# Patient Record
Sex: FEMALE | Race: WHITE | Employment: OTHER
[De-identification: names, ages, dates, MRNs, and addresses within clinical notes are randomized per-mention and may not be internally consistent; named-entity substitution may affect disease eponyms.]

---

## 2019-06-10 ENCOUNTER — HOSPITAL ENCOUNTER (OUTPATIENT)
Dept: PHYSICAL THERAPY | Facility: CLINIC | Age: 74
Setting detail: THERAPIES SERIES
Discharge: HOME | End: 2019-06-10
Payer: MEDICARE

## 2019-06-10 ENCOUNTER — APPOINTMENT (OUTPATIENT)
Dept: PHYSICAL THERAPY | Facility: CLINIC | Age: 74
End: 2019-06-10
Payer: MEDICARE

## 2019-06-10 NOTE — FLOWSHEET NOTE
[] [address]  [address]    [phone]  F: [phone]   [] [address]  [facility]   [address]  P: [phone]  F: [phone]  [x] [place]  [address] [facility]  P: [phone]  F: [phone]   [] [address] [facility] [address]   [place]: [phone]  F: [phone]  [] [place] [address] [facility] [address]  [place]: [phone]   F: [phone]     Physical Therapy Cancel/No Show note    Date: 6/10/2019  Patient: Brody Brower  : 1945  MRN: [id number]    Cancels/No Shows to date: 1 evaluation     For today's appointment patient:    [x]  Cancelled    [] Rescheduled appointment    [] No-show     Reason given by patient:    []  Patient ill    []  Conflicting appointment    [] No transportation      [] Conflict with work    [] No reason given    [] Weather related    [] Other:      Comments:   In pain today       [x] Next appointment was confirmed    Electronically signed by: Servando Blake

## 2019-06-24 ENCOUNTER — HOSPITAL ENCOUNTER (OUTPATIENT)
Dept: PHYSICAL THERAPY | Facility: CLINIC | Age: 74
Setting detail: THERAPIES SERIES
Discharge: HOME | End: 2019-06-24
Payer: MEDICARE

## 2019-06-24 PROCEDURE — 97113 AQUATIC THERAPY/EXERCISES: CPT

## 2019-06-24 PROCEDURE — 97162 PT EVAL MOD COMPLEX 30 MIN: CPT

## 2019-06-24 NOTE — FLOWSHEET NOTE
Mikal. [address] [place] [address]  [address], [facility] 36.  Phone: [phone]  Fax:     [phone]    Physical Therapy Evaluation    Date:  2019  Patient: Martha Plummer  : 1945  MRN: [id number]  Physician: Misty Salazar: Medicare              Eligibility Status:  Eligible     Secondary Insurance (if applicable) [address]              Eligibility Status: Eligible     DOS: 6/10/19  # of visits allowed/remaining: follow medicare guidelines  Source: Website  Medical Diagnosis: Lumbar pain    Rehab Codes: M54.5  Onset Date:                                    Subjective:  Pt reports pain, stiffness of B lower lumbar, post/lat hip, knee regions. Pt reports long hx of pain which she attributes to arthritis/fibromyalgia. Previous Aquatic PT w/ good results. Pt states her activity level was dramatically reduced 3-4 yrs ago when her  became ill w/ Parkinsons Dementia which required to be at his bedside constantly. 2 yrs ago she a fall in her son's garage which caused multiple rib fx. Since that time she has noted a progressive decline in her mobility w/ a worsening of her back, hip, and knee symptoms.      PMHx: [] Unremarkable [] Diabetes [] HTN  [] Pacemaker   [] MI/Heart Problems [] Cancer [] Arthritis [] Asthma                         [x] refer to full medical chart  In [facility]  [] Other:        Tests: [] X-Ray: [] MRI:  [] Other:    Medications: [x] Refer to full medical record [] None [] Other:  Allergies:      [x] Refer to full medical record [] None [] Other:    Function:  Hand Dominance  [x] Right  [] Left  Working:  [x] Normal Duty  [] Light Duty  [] Off D/T Condition  [] Retired     [] Not Employed  []  Disability  [] Other:            Return to work:   Job/ADL Description: Pt retired, amb w/ str cane    Pain:  [x] Yes  [] No Pain Rating: (0-10 scale) 4/10  Pain altered Tx:  [] Yes  [x] No  Action:    Symptoms:  [] Improving [] Worsening [x] Same  Better:  [] AM    [] PM    [] Sit    [] Rise/Sit    []Stand    [] Walk    [] Lying    [] Other:  Worse: [x] AM    [x] PM    [x] Sit    [x] Rise/Sit    [x]Stand    [x] Walk    [x] Lying    [x] Bend                      [] [place]    [] Other:  Sleep: [] OK    [x] Disturbed    Objective:     L/R ROM  ° A/P STRENGTH   Lumbar Flex 60  2+    Ext 10  2+    SB 10 10     Rot 45 45     Hip Flex WNL WNL 4- 4-   Ext WNL WNL 3+ 3+   Knee flex WNL WNL 4 4   Ext WNL WNL 4 4     OBSERVATION No Deficit Deficit Not Tested Comments   Posture  [x]     Palpation [] [x]     Sensation [x] []       FUNCTION Normal Difficult Unable   Sitting  [] [x] []   Ambulation [] [x] []   Bending [] [x] []   lifting [] [x] []     ASSESSMENT   Pt limited by lumbar, hip, knee pain, ROM loss, lumbopelvic, leg weakness w/ limitation in functional activity w/ underlying hx of arthritis/fibromyalgia. Pt will benefit from Aquatic PT for ROM, strengthening exercises in gravity reduced environment. PROBLEMS  Lumbar, hip, knee pain  Lumbar ROM loss  Lumbar, hip, knee weakness  Lumbar, hip, knee functional deficits    SHORT TERM GOALS ( 8 visits)  Lumbar, hip, knee pain = 0  Lumbar ROM = WNL  Lumbar, hip, knee strength = 4+/5  Lumbar, hip, knee function: sit, bend, lift w/o pain    LONG TERM GOALS ( 12 visits)  Independent Home Exercise program  Return to normal activity    PATIENT GOAL  Strength, balance    Rehab Potential:  [x] Good  [] Fair  [] Poor   Suggested Professional Referral:  [x] No  [] Yes:  Barriers to Goal Achievement[de-identified]  [x] No  [] Yes:  Domestic Concerns:  [x] No  [] Yes:    Pt. Education:  [x] Plans/Goals, Risks/Benefits discussed  [x] Home exercise program  Method of Education: [x] Verbal  [x] Demo  [x] Written  Comprehension of Education:  [x] Verbalizes understanding. [x] Demonstrates understanding. [] Needs Review. [] Demonstrates/verbalizes understanding of HEP/Ed previously given.     Treatment Plan:  [x] Therapeutic Exercise    [] Modalities:  [] Therapeutic Activity    [] Ultrasound  [] Electrical Stimulation  [] Gait Training     [] Massage       [] Lumbar/Cervical Traction  [] Neuromuscular Re-education [x] Cold/hotpack [] Instruction in HEP  [] Manual Therapy   [x] Aquatic Therapy [] Other:     [] Iontophoresis: 4 mg/mL Dexamethasone Sodium Phosphate 40-80 mAmin  [] Drug allergies reviewed    _______Initials           _______Date     Frequency:  2 x/week for 12 visits    Todays Treatment:  See Aquatic PT note for specific exercises    Specific Instructions for next treatment:    Treatment Charges: Mins Units   [x] Evaluation ----- 1   []  Modalities     [x]  Ther Exercise     []  Manual Therapy     []  Ther Activities     []  Aquatics     []  Other       Time in: 1500     Time out: 1600    Electronically signed by: Letty Travis, PT        Physician Signature:________________________________Date:__________________  By signing above, I have reviewed this plan of care and certify a need for medically   necessary rehabilitation services.      *PLEASE SIGN ABOVE AND FAX BACK ALL PAGES*

## 2019-06-27 ENCOUNTER — HOSPITAL ENCOUNTER (OUTPATIENT)
Dept: PHYSICAL THERAPY | Facility: CLINIC | Age: 74
Setting detail: THERAPIES SERIES
Discharge: HOME | End: 2019-06-27
Payer: MEDICARE

## 2019-06-27 PROCEDURE — 97113 AQUATIC THERAPY/EXERCISES: CPT

## 2019-06-27 NOTE — PRE-CERTIFICATION NOTE
Medicare Cap   [x] Physical Therapy  [] Speech Therapy  [] Occupational therapy  *PT and Speech caps combine      $2040 Cap limit < kx modifier needed        Patient Name: Libertad Whitehead: 1945    Note:  This is an estimate of charges billed.      Date of [address] Name # units/ charge $$$ charge Daily Total Charge Ongoing Total $$$   6/24 Eval 1 81.73 81.73 81.73   6/24 Aqua 2 37.25+27.62 64.87 146.60   6/27 aqua 2 37.25+27.62 64.87 211.47

## 2019-06-27 NOTE — FLOWSHEET NOTE
[] [address]       Physical Therapy [facility]       [place] [address]        [address]       Phone: [phone]       Fax: [phone] [] [facility]       Promotion at [address] [facility]       Phone: [phone]       Fax: [phone] [] Mikal. [address] [place] [address] for Health Promotion  [address]   Phone: [phone]   Fax:  [phone]     Physical Therapy Daily  Aquatic Treatment Note    Date:  2019  Patient Name:  Sanjeev Sanon    :  1945  MRN: [id number]  Physician: Cristhian Unger MD    Insurance: Medicare  Diagnosis: Arthritis, Fibromyalgia   Onset Date:    Next Dr. James Pin:     Visit# / total visits:   Cancels/No Shows: 1CX    Subjective:    Pain:  [x] Yes  [] No Location: LB Pain Rating: (0-10 scale) 10  Pain altered Tx:  [] No  [] Yes  Action:  Comments: Pt reports her low back feels \"okay\" however her hips are bothering her.      Objective:     KEY  B = Belt G = Gloves N = Noodle   C = Cuffs K = Kickboard P = Paddles   CC = Cervical Collar L = Laps T = Theratube   DB = Dumbells M = Minutes W = Weights     Exercises/Activities  Warm-up/Amb   Dynamic Exercises     Forward 3L 3L  March  3L    Sideways 3L 3L  Squat      Backwards 3L 3L  Retro HS curls          Retro SLR      Stretches    Braiding      Gastroc/Soleus    Heel to Toe amb      Hamstring    Toe amb      Hip flexor    Heel amb      Piriformis          SKTC          Pec Stretch          Post Deltoid    Static Exercises UE          Shoulder flex/ext      Static Exercises LE    Shoulder abd/add      Heel/toe raises 10 10  Shoulder H.  abd/add      Marches 10 10  Shoulder IR/ER      Mini-squats 10 10  Rowing      4-way hip  10 10  Arm Circles      Hamstring curls 10 10  UT shrugs/rolls      Hip Circles/Fig 8    Scap squeezes      Ankle ROM    Diagonals 1/2      Lunges     Elbow flex/ext          Pron/Sup      Functional

## 2019-07-01 ENCOUNTER — HOSPITAL ENCOUNTER (OUTPATIENT)
Dept: PHYSICAL THERAPY | Facility: CLINIC | Age: 74
Setting detail: THERAPIES SERIES
Discharge: HOME | End: 2019-07-01
Payer: MEDICARE

## 2019-07-03 ENCOUNTER — HOSPITAL ENCOUNTER (OUTPATIENT)
Dept: PHYSICAL THERAPY | Facility: CLINIC | Age: 74
Setting detail: THERAPIES SERIES
Discharge: HOME | End: 2019-07-03
Payer: MEDICARE

## 2019-07-03 NOTE — FLOWSHEET NOTE
[] [address]  [address]    [phone]  F: [phone]   [] [address]  [facility] 36   [address]  P: [phone]  F: [phone]  [x] [place]  [address]  P: [phone]  F: [phone]   [] [address]  [facility] [address]   [place]: [phone]  F: [phone]  [] [address]  [place]: [phone]   F: [phone]     Physical Therapy Cancel/No Show note    Date: 7/3/2019  Patient: Yuliet Huitron  : 1945  MRN: [id number]    Cancels/No Shows to date: 1 evaluation / 2 cx     For today's appointment patient:    [x]  Cancelled    [] Rescheduled appointment    [] No-show     Reason given by patient:    [x]  Patient ill    []  Conflicting appointment    [] No transportation      [] Conflict with work    [] No reason given    [] Weather related    [] Other:      Comments:   In pain today       [x] Next appointment was confirmed    Electronically signed by: Estrella Casas

## 2019-07-08 ENCOUNTER — HOSPITAL ENCOUNTER (OUTPATIENT)
Dept: PHYSICAL THERAPY | Facility: CLINIC | Age: 74
Setting detail: THERAPIES SERIES
Discharge: HOME | End: 2019-07-08
Payer: MEDICARE

## 2019-07-12 ENCOUNTER — HOSPITAL ENCOUNTER (OUTPATIENT)
Dept: PHYSICAL THERAPY | Facility: CLINIC | Age: 74
Setting detail: THERAPIES SERIES
Discharge: HOME | End: 2019-07-12
Payer: MEDICARE

## 2022-01-20 NOTE — FLOWSHEET NOTE
Noodle Twist  Hip flex/ext    Noodle Push down  Hip IR/ER    Kickboard push/pull  Knee flex/ext      Push/pull on [employer] 5m   Other:    Specific Instructions for next treatment:    Treatment Charges: Mins Units   []  Modalities     []  Ther Exercise     []  Manual Therapy     []  Ther Activities     [x]  Aquatics 30 2   []  Other       Assessment: [x] Progressing toward goals. Initiated aquatic ex per flow sheet with fair kim. Pt became tired quickly, but demo good posture throughout tx. Pt feels a bit sore after tx, but looser in LB. Will cont to progress as kim. [] No change. [] Other:  STG/LTG    Pt. Education:  [x] Yes  [] No  [] Reviewed Prior HEP/Ed  Method of Education: [x] Verbal  [] Demo  [] Written  Comprehension of Education:  [x] Verbalizes understanding. [] Demonstrates understanding. [] Needs review. [] Demonstrates/verbalizes HEP/Ed previously given. Plan: [x] Continue per plan of care.    [] Other:      Time In:1700            Time Out: 1737    Electronically signed by:  Ruma Gao PTA Attending Only

## 2023-06-19 ENCOUNTER — HOSPITAL ENCOUNTER (OUTPATIENT)
Dept: PHYSICAL THERAPY | Facility: CLINIC | Age: 78
Setting detail: THERAPIES SERIES
Discharge: HOME | End: 2023-06-19
Payer: MEDICARE

## 2023-06-19 PROCEDURE — 97113 AQUATIC THERAPY/EXERCISES: CPT

## 2023-06-19 NOTE — FLOWSHEET NOTE
[x] [facility]  [address]  P: [phone]  F: [phone]     Physical Therapy Daily  Aquatic Treatment Note    Date:  2023  Patient Name:  Clau Arguelles    :  1945  MRN: [id number]  Physician: Haresh Yo MD                              Insurance: 1. Medicare: no auth req'd; vs bomn - lurdes yr; ded met; coins 20%  2. [facility] AARP medicare supp; follows medicare guidelines, covers part b coins after ded. No pt estimate. Medical Diagnosis:     Diagnosis   M79.7 (ICD-10-CM) - Fibromyalgia   M16.9 (ICD-10-CM) - Osteoarthritis of hip, unspecified                 Rehab Codes:  M25.552, R26.81, M62.81, Z91.81  Onset date: 2020             Next 's appt.: PRN    Visit# / total visits:   Cancels/No Shows:     Subjective:    Pain:  [x] Yes  [] No Location: L hip  Pain Rating: (0-10 scale) 4/10  Pain altered Tx:  [x] No  [] Yes  Action:  Comments:Pt reports feeling tired today and sore in L hip. Pt has light bruising on her L lat thigh that she states has been there since she fell on her hip last year. Objective: Fall risk-1:1 into the pool for assist with amb first few visits.      KEY  B = Belt G = Gloves N = Noodle   C = Cuffs K = Kickboard P = Paddles   CC = Cervical Collar L = Laps T = Theratube   DB = Dumbells M = Minutes W = Weights     Exercises/Activities  Warm-up/Amb  Dynamic Exercises    Forward 3L N March    Sideways 3L N Squat    Backwards  Retro HS curls      Retro SLR    Stretches  Braiding    Gastroc/Soleus  Heel to Toe amb    Hamstring  Toe amb    Hip flexor  Heel amb    Piriformis      SKTC      Pec Stretch      Post Deltoid  Static Exercises UE Back against rail     Shoulder flex/ext 10   Static Exercises LE  Shoulder abd/add 10   Heel/toe raises 10 Shoulder H.  abd/add 10   Marches 10 Shoulder IR/ER    Mini-squats 10 Rowing 10   4-way hip  10 Arm Circles    Hamstring curls 10 UT shrugs/rolls    Hip

## 2023-06-21 ENCOUNTER — HOSPITAL ENCOUNTER (OUTPATIENT)
Dept: PHYSICAL THERAPY | Facility: CLINIC | Age: 78
Setting detail: THERAPIES SERIES
Discharge: HOME | End: 2023-06-21
Payer: MEDICARE

## 2023-06-21 PROCEDURE — 97113 AQUATIC THERAPY/EXERCISES: CPT

## 2023-06-21 NOTE — PRE-CERTIFICATION NOTE
Medicare Cap   [x] Physical Therapy  [] Speech Therapy  [] Occupational therapy  *PT and Speech caps combine      $2230 Limit for PT and Speech combined  $2230 Limit for OT individually  At the beginning of the month where you expect to go over $2230, please add the [address] [place] [address] modifier      Patient Name: Bonifacio Free: 1945    Note:  This is an estimate of charges billed.      Date of [address] Name # units/ charge $$$ charge Daily Total Charge Ongoing Total $$$   6/14 Low eval, TE 2 97.02+22.29 119.31 119.31   6/19/23 3 aqua  31.20+23.12+23.12 77.44 196.75   6/21/23 3aqua  31.20+21.12+23.12 77.44 274.19

## 2023-06-21 NOTE — FLOWSHEET NOTE
[x] [address] [facility]  [address] [facility]  P: [phone]  F: [phone]     Physical Therapy Daily  Aquatic Treatment Note    Date:  2023  Patient Name:  Keri Quintero    :  1945  MRN: [id number]  Physician: Brian Young MD                              Insurance: 1. Medicare: no auth req'd; vs bomn - lurdes yr; ded met; coins 20%  2. [facility] AARP medicare supp; follows medicare guidelines, covers part b coins after ded. No pt estimate. Medical Diagnosis:     Diagnosis   M79.7 (ICD-10-CM) - Fibromyalgia   M16.9 (ICD-10-CM) - Osteoarthritis of hip, unspecified                 Rehab Codes:  M25.552, R26.81, M62.81, Z91.81  Onset date: 2020             Next 's appt.: PRN    Visit# / total visits: 3/18  Cancels/No Shows:     Subjective:    Pain:  [x] Yes  [] No Location: L hip  Pain Rating: (0-10 scale) 4/10  Pain altered Tx:  [x] No  [] Yes  Action:  Comments:Pt reports feeling okay after last session. Cont to feel tired and off balance today along with stating her arthritis is acting up. Objective: Fall risk-1:1 into the pool for assist with amb first few visits.      KEY  B = Belt G = Gloves N = Noodle   C = Cuffs K = Kickboard P = Paddles   CC = Cervical Collar L = Laps T = Theratube   DB = Dumbells M = Minutes W = Weights     Exercises/Activities  Warm-up/Amb  Dynamic Exercises    Forward 3L N March 2L N   Sideways 3L N Squat    Backwards  Retro HS curls      Retro SLR    Stretches  Braiding    Gastroc/Soleus  Heel to Toe amb    Hamstring  Toe amb    Hip flexor  Heel amb    Piriformis      SKTC      Pec Stretch      Post Deltoid  Static Exercises UE Back against rail     Shoulder flex/ext 15   Static Exercises LE  Shoulder abd/add 15   Heel/toe raises 15 Shoulder H.  abd/add 15    15 Shoulder IR/ER    Mini-squats 15 Rowing 15   4-way hip  15 Arm Circles    Hamstring curls 15 UT shrugs/rolls    Hip Circles/Fig 8  Scap squeezes

## 2023-06-26 ENCOUNTER — HOSPITAL ENCOUNTER (OUTPATIENT)
Dept: PHYSICAL THERAPY | Facility: CLINIC | Age: 78
Setting detail: THERAPIES SERIES
Discharge: HOME | End: 2023-06-26
Payer: MEDICARE

## 2023-06-26 PROCEDURE — 97113 AQUATIC THERAPY/EXERCISES: CPT

## 2023-06-28 ENCOUNTER — HOSPITAL ENCOUNTER (OUTPATIENT)
Dept: PHYSICAL THERAPY | Facility: CLINIC | Age: 78
Setting detail: THERAPIES SERIES
Discharge: HOME | End: 2023-06-28
Payer: MEDICARE

## 2023-06-28 PROCEDURE — 97113 AQUATIC THERAPY/EXERCISES: CPT

## 2023-07-03 ENCOUNTER — HOSPITAL ENCOUNTER (OUTPATIENT)
Dept: PHYSICAL THERAPY | Facility: CLINIC | Age: 78
Setting detail: THERAPIES SERIES
Discharge: HOME | End: 2023-07-03
Payer: MEDICARE

## 2023-07-03 NOTE — FLOWSHEET NOTE
[] [address]  [address] [facility].    [phone]  F: [phone]   [] [address]  [address]   [address]  P: [phone]  F: [phone]  [] [address]  [address]  P: [phone]  F: [phone] [] Hansel Butt  P: [phone]  F: [phone]  [] [address]   [address]   [place]: [phone]  F: [phone]   [] [address] [facility]  [address] [facility] [address]  [place]: [phone]   F: [phone]     Physical Therapy Cancel/No Show note    Date: 7/3/2023  Patient:  Rossana Morgan  : 1945  MRN: [id number]    Cancels/No Shows to date:     For today's appointment patient:    [x]  Cancelled    [] Rescheduled appointment    [] No-show     Reason given by patient:    []  Patient ill    []  Conflicting appointment    [] No transportation      [] Conflict with work    [x] No reason given    [] Weather related    [] [id number]    [] Other:      Comments:        [] Next appointment was confirmed    Electronically signed by: Zoe Kohli

## 2023-07-05 ENCOUNTER — HOSPITAL ENCOUNTER (OUTPATIENT)
Dept: PHYSICAL THERAPY | Facility: CLINIC | Age: 78
Setting detail: THERAPIES SERIES
Discharge: HOME | End: 2023-07-05
Payer: MEDICARE

## 2023-07-05 PROCEDURE — 97113 AQUATIC THERAPY/EXERCISES: CPT

## 2023-07-05 NOTE — FLOWSHEET NOTE
risk of falling   Patient will improve (B) tandem to 20 sec in order to improve balance and stability within (B) LE  Patient will report pain as 1/10                    Patient goals: \"Prevent falling\"    Pt. Education:  [x] Yes  [] No  [] Reviewed Prior HEP/Ed  Method of Education: [x] Verbal  [] Demo  [] Written  Comprehension of Education:  [x] Verbalizes understanding. [] Demonstrates understanding. [] Needs review. [] Demonstrates/verbalizes HEP/Ed previously given. Plan: [x] Continue per plan of care.    [] Other:      Time In:3:30 pm            Time Out: 4:30 pm    Electronically signed by:  Sarai Sims PTA

## 2023-07-05 NOTE — PRE-CERTIFICATION NOTE
Medicare Cap   [x] Physical Therapy  [] Speech Therapy  [] Occupational therapy  *PT and Speech caps combine      $2230 Limit for PT and Speech combined  $2230 Limit for OT individually  At the beginning of the month where you expect to go over $2230, please add the [facility] modifier      Patient Name: Patt Clarke: 1945    Note:  This is an estimate of charges billed.      Date of [address] [facility] Name # units/ charge $$$ charge Daily Total Charge Ongoing Total $$$   6/14 Low eval, TE 2 97.02+22.29 119.31 119.31   6/19/23 3 aqua  31.20+23.12+23.12 77.44 196.75   6/21/23 3aqua  31.20+21.12+23.12 77.44 274.19   6/26 3 aqua   77.44 351.63   6/28 3 aqua   77.44 429.07   7/5 3 aqua   77.44 506.51

## 2023-07-10 ENCOUNTER — HOSPITAL ENCOUNTER (OUTPATIENT)
Dept: PHYSICAL THERAPY | Facility: CLINIC | Age: 78
Setting detail: THERAPIES SERIES
Discharge: HOME | End: 2023-07-10
Payer: MEDICARE

## 2023-07-10 NOTE — FLOWSHEET NOTE
[] [address]  [address] [facility].    [phone]  F: [phone]   [] [address]  [address]   [address]  P: [phone]  F: [phone]  [] [address]  [address] [facility]  P: [phone]  F: [phone] [] [facility]  P: [phone]  F: [phone]  [] [address] [facility]  [address]   [place]: [phone]  F: [phone]   [] [address] [facility]  [address] [facility] [address]  [place]: [phone]   F: [phone]     Physical Therapy Cancel/No Show note    Date: 7/10/2023  Patient: Yordan Sanchez  : 1945  MRN: [id number]    Cancels/No Shows to date: 20    For today's appointment patient:    [x]  Cancelled    [] Rescheduled appointment    [] No-show     Reason given by patient:    []  Patient ill    []  Conflicting appointment    [] No transportation      [] Conflict with work    [] No reason given    [] Weather related    [] COVID-19    [x] Other:      Comments: Patient in too much pain for PT today.        [] Next appointment was confirmed    Electronically signed by: Benson More

## 2023-07-12 ENCOUNTER — HOSPITAL ENCOUNTER (OUTPATIENT)
Dept: PHYSICAL THERAPY | Facility: CLINIC | Age: 78
Setting detail: THERAPIES SERIES
Discharge: HOME | End: 2023-07-12
Payer: MEDICARE

## 2023-07-12 NOTE — FLOWSHEET NOTE
[] [address]  [address] [facility].    [phone]  F: [phone]   [] [address]  [address] [facility] [address]   [address]  P: [phone]  F: [phone]  [x] [address]  [address]  P: [phone]  F: [phone] [] Hansel Filomena  P: [phone]  F: [phone]  [] [address] [facility]  [address]   [address]   [place]: [phone]  F: [phone]   [] [address] [facility]  [address]  [place]: [phone]   F: [phone]     Physical Therapy Cancel/No Show note    Date: 2023  Patient: Suhas Chavez  : 1945  MRN: [id number]    Cancels/No Shows to date: 3/0    For today's appointment patient:    [x]  Cancelled    [] Rescheduled appointment    [] No-show     Reason given by patient:    []  Patient ill    []  Conflicting appointment    [] No transportation      [] Conflict with work    [] No reason given    [] Weather related    [] COVID-19    [x] Other:      Comments: Patient called stating she had an xray done today.  She said she is going to see her dr then will notify the PT .      [] Next appointment was confirmed    Electronically signed by: Roni Urena

## 2023-07-19 ENCOUNTER — HOSPITAL ENCOUNTER (OUTPATIENT)
Dept: PHYSICAL THERAPY | Facility: CLINIC | Age: 78
Setting detail: THERAPIES SERIES
Discharge: HOME | End: 2023-07-19
Payer: MEDICARE

## 2023-07-19 NOTE — FLOWSHEET NOTE
[] [address]  [address] [facility].    [phone]  F: [phone]   [] [address]  [address]   [address]  P: [phone]  F: [phone]  [x] [address]  [address] [facility]  P: [phone]  F: [phone] [] [facility]  P: [phone]  F: [phone]  [] [address] [facility]   [address]   [place]: [phone]  F: [phone]   [] [address] [facility]  [address] [facility] [address]  [place]: [phone]   F: [phone]     Physical Therapy Cancel/No Show note    Date: 2023  Patient:  Levi Mccollum  : 1945  MRN: [id number]    Cancels/No Shows to date:     For today's appointment patient:    [x]  Cancelled    [] Rescheduled appointment    [] No-show     Reason given by patient:    [x]  Patient ill    []  Conflicting appointment    [] No transportation      [] Conflict with work    [] No reason given    [] Weather related    [] [id number]    [] Other:      Comments:       [] Next appointment was confirmed    Electronically signed by: Pepper Rodriguez

## 2023-08-03 ENCOUNTER — HOSPITAL ENCOUNTER (OUTPATIENT)
Dept: PHYSICAL THERAPY | Facility: CLINIC | Age: 78
Setting detail: THERAPIES SERIES
Discharge: HOME | End: 2023-08-03
Payer: MEDICARE

## 2023-08-03 PROCEDURE — 97530 THERAPEUTIC ACTIVITIES: CPT

## 2023-08-03 NOTE — PRE-CERTIFICATION NOTE
Medicare Cap   [x] Physical Therapy  [] Speech Therapy  [] Occupational therapy  *PT and Speech caps combine      $2230 Limit for PT and Speech combined  $2230 Limit for OT individually  At the beginning of the month where you expect to go over $2230, please add the [facility] modifier      Patient Name: Tomy Lombard: 1945    Note:  This is an estimate of charges billed.      Date of [facility] Name # units/ charge $$$ charge Daily Total Charge Ongoing Total $$$   6/14 Low kyrie, TE 2 97.02+22.29 119.31 119.31   6/19/23 3 aqua  31.20+23.12+23.12 77.44 196.75   6/21/23 3aqua  31.20+21.12+23.12 77.44 274.19   6/26 3 aqua   77.44 351.63   6/28 3 aqua   77.44 429.07   7/5 3 aqua   77.44 506.51   8/3 2 TA  35.10+25.06 60.16 566.67

## 2023-08-03 NOTE — PROGRESS NOTES
will improve (B) LE strength to 5/5 in order to improve walking tolerance and reduce AD use (ongoing)   ? Function: Patient will be able to ambulate around clinic independently with quad cane, improve heel to toe contact and improved step length Patient ambulates with rolling walker and continued decrease in heel to toe contact (ongoing)   Patient to be independent with home exercise program as demonstrated by performance with correct form without cues.  (MET)   Patient will demonstrate knowledge of fall prevention techniques  Notes a number of almost falls (ongoing)   LTG: (to be met in 18 treatments)  Patient will improve LEFS to <50% functionally impaired in order to indicate improved overall quality of life   Patient will improve TUG to 20 sec or less with use of quad cane in order to improve gait speed and reduce risk of falling  Patient demonstrates TUG as 27 sec with rolling walker (ongoing)   Patient will improve (B) tandem to 20 sec in order to improve balance and stability within (B) LE (B) tandem stance: 5 sec (ongoing)   Patient will report pain as 1/10 Patient reports pain as 6/10 (ongoing)     Treatment Plan:  [x] Therapeutic Exercise   60267  [] Iontophoresis: 4 mg/mL Dexamethasone Sodium Phosphate  mAmin  09186   [x] Therapeutic Activity  37808 [] Vasopneumatic cold with compression  53818    [] Gait Training   93040 [x] Ultrasound   [id number]   [] Neuromuscular Re-education  50783 [] Electrical Stimulation Unattended  90486   [x] Manual Therapy  59144 [] Electrical Stimulation Attended  98644   [x] Instruction in HEP  [] Lumbar/Cervical Traction  88158   [x] Aquatic Therapy   04183 [x] Cold/hotpack    [] Massage   14345      [] Dry Needling, 1 or 2 muscles  51686   [] Biofeedback, first 15 minutes   74414  [] Biofeedback, additional 15 minutes   80689 [] Dry Needling, 3 or more muscles  08438       Patient Status:     [x] Continue per initial plan of care. [] Additional visits necessary.     []

## 2023-08-09 ENCOUNTER — HOSPITAL ENCOUNTER (OUTPATIENT)
Dept: PHYSICAL THERAPY | Facility: CLINIC | Age: 78
Setting detail: THERAPIES SERIES
Discharge: HOME | End: 2023-08-09
Payer: MEDICARE

## 2023-08-09 PROCEDURE — 97113 AQUATIC THERAPY/EXERCISES: CPT

## 2023-08-09 NOTE — PRE-CERTIFICATION NOTE
Medicare Cap   [x] Physical Therapy  [] Speech Therapy  [] Occupational therapy  *PT and Speech caps combine      $2230 Limit for PT and Speech combined  $2230 Limit for OT individually  At the beginning of the month where you expect to go over $2230, please add the [facility] modifier      Patient Name: Lalo Vazquez: 1945    Note:  This is an estimate of charges billed. Date of 705 [facility] Name # units/ charge $$$ charge Daily Total Charge Ongoing Total $$$   6/14 Low eval, TE 2 97.02+22.29 119.31 119.31   6/19/23 3 aqua  31.20+23.12+23.12 77.44 196.75   6/21/23 3aqua  31.20+21.12+23.12 77.44 274.19   6/26 3 aqua   77.44 351.63   6/28 3 aqua   77.44 429.07   7/5 3 aqua   77.44 506.51   8/3 2 TA  35.10+25.06 60.16 566.67   8/9 3 aqua PTA 2023  30.74+22.57+22.57 75.88 642. 54

## 2023-08-09 NOTE — FLOWSHEET NOTE
[x] [facility] Outpatient Rehabilitation & Therapy  [address]  P: [phone]  F: [phone]     Physical Therapy Daily  Aquatic Treatment Note    Date:  2023  Patient Name:  Mukesh Turner    :  1945  MRN: [id number]  Physician: Miky Ferrell MD                              Insurance: 1. Medicare: no auth req'd; vs bomn - lurdes yr; ded met; coins 20%  2. [facility] AARP medicare supp; follows medicare guidelines, covers part b coins after ded. No pt estimate. 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 Some instability, uses rollator most of the time but occ forgets. Objective: Fall risk-1:1 into the pool for assist with amb.      KEY  B = Belt G = Gloves N = Noodle   C = Cuffs K = Kickboard P = Paddles   CC = Cervical Collar L = Laps T = Theratube   DB = Dumbells M = Minutes W = Weights     Exercises/Activities  Warm-up/Amb 7/5 Dynamic Exercises 7/5   Forward 3L N March NT   Sideways 3L N Squat    Backwards  Retro HS curls      Retro SLR    Stretches  Braiding    Gastroc/Soleus  Heel to Toe amb    Hamstring  Toe amb    Hip flexor  Heel amb    Piriformis      SKTC      Pec Stretch      Post Deltoid  Static Exercises UE Back against rail     Shoulder flex/ext 20   Static Exercises LE  Shoulder abd/add 20   Heel/toe raises 20 Shoulder H.  abd/add 20   March 20 Shoulder IR/ER    Mini-squats 20 Rowing NT   4-way hip  20 Arm Circles

## 2023-08-16 ENCOUNTER — HOSPITAL ENCOUNTER (OUTPATIENT)
Dept: PHYSICAL THERAPY | Facility: CLINIC | Age: 78
Setting detail: THERAPIES SERIES
Discharge: HOME | End: 2023-08-16
Payer: MEDICARE

## 2023-08-16 PROCEDURE — 97035 APP MDLTY 1+ULTRASOUND EA 15: CPT

## 2023-08-16 PROCEDURE — 97110 THERAPEUTIC EXERCISES: CPT

## 2023-08-16 NOTE — PRE-CERTIFICATION NOTE
Medicare Cap   [x] Physical Therapy  [] Speech Therapy  [] Occupational therapy  *PT and Speech caps combine      $2230 Limit for PT and Speech combined  $2230 Limit for OT individually  At the beginning of the month where you expect to go over $2230, please add the [facility] modifier      Patient Name: Patt Clarke: 1945    Note:  This is an estimate of charges billed.      Date of 705 [facility] Name # units/ charge $$$ charge Daily Total Charge Ongoing Total $$$   6/14 Low eval, TE 2 97.02+22.29 119.31 119.31   6/19/23 3 aqua  31.20+23.12+23.12 77.44 196.75   6/21/23 3aqua  31.20+21.12+23.12 77.44 274.19   6/26 3 aqua   77.44 351.63   6/28 3 aqua   77.44 429.07   7/5 3 aqua   77.44 506.51   8/3 2 TA  35.10+25.06 60.16 566.67   8/9 3 aqua PTA 2023  30.74+22.57+22.57 75.88 642.55   8/16   24.77+19.15+9.29 53.21 695.76

## 2023-08-18 ENCOUNTER — HOSPITAL ENCOUNTER (OUTPATIENT)
Dept: PHYSICAL THERAPY | Facility: CLINIC | Age: 78
Setting detail: THERAPIES SERIES
Discharge: HOME | End: 2023-08-18
Payer: MEDICARE

## 2023-08-18 PROCEDURE — 97035 APP MDLTY 1+ULTRASOUND EA 15: CPT

## 2023-08-18 PROCEDURE — 97110 THERAPEUTIC EXERCISES: CPT

## 2023-08-18 NOTE — FLOWSHEET NOTE
[] [address]  [address] [facility] [place] [facility].  [phone]  F: [phone] [] [address] [facility]  [address]   [address]  P: [phone]  F: [phone] [x] [address]  [address] [facility]  P: [phone]  F: [phone] [] [place]: [phone]  F: [phone] [] [address] [facility]  [address] [facility]   [address]   P: [phone]  F: [phone]  [] [address] [facility].   P: [phone]  F: [phone] [] 205 [facility]  [address]  P: [phone]  F: [phone] [] 224 [facility]  [address] [facility]  [place]: [phone]  F: [phone] [] [address] [facility] [address]  [place]: [phone]  F: [phone]      Physical Therapy Daily Treatment Note    Date:  2023  Patient Name:  Rick Cole    :  1945  MRN: [id number]  Physician: Zack Chacon MD                              Insurance: 1. Medicare: no auth req'd; vs bomn - lurdes yr; ded met; coins 20%  2. [facility] AAR medicare supp; follows medicare guidelines, covers part b coins after ded. No pt estimate.   Medical Diagnosis:     Diagnosis   M79.7 (ICD-10-CM) - Fibromyalgia   M16.9 (ICD-10-CM) - Osteoarthritis of hip, unspecified                 Rehab Codes:  M25.552, R26.81, M62.81, Z91.81  Onset date: 2020             Next 's appt.: PRN     Visit# / total visits: 10/18  (Medicare recheck due vs. 18)                   Cancels/No Shows:          Subjective:    Pain:  [x] Yes  []

## 2023-08-21 ENCOUNTER — HOSPITAL ENCOUNTER (OUTPATIENT)
Dept: PHYSICAL THERAPY | Facility: CLINIC | Age: 78
Setting detail: THERAPIES SERIES
Discharge: HOME | End: 2023-08-21
Payer: MEDICARE

## 2023-08-21 PROCEDURE — 97110 THERAPEUTIC EXERCISES: CPT

## 2023-08-21 PROCEDURE — 97035 APP MDLTY 1+ULTRASOUND EA 15: CPT

## 2023-08-21 NOTE — PRE-CERTIFICATION NOTE
Medicare Cap   [x] Physical Therapy  [] Speech Therapy  [] Occupational therapy  *PT and Speech caps combine      $2230 Limit for PT and Speech combined  $2230 Limit for OT individually  At the beginning of the month where you expect to go over $2230, please add the [facility] modifier      Patient Name: Evelio Mosqueda: 1945    Note:  This is an estimate of charges billed.      Date of 705 [facility] Name # units/ charge $$$ charge Daily Total Charge Ongoing Total $$$   6/14 Low eval, TE 2 97.02+22.29 119.31 119.31   6/19/23 3 aqua  31.20+23.12+23.12 77.44 196.75   6/21/23 3aqua  31.20+21.12+23.12 77.44 274.19   6/26 3 aqua   77.44 351.63   6/28 3 aqua   77.44 429.07   7/5 3 aqua   77.44 506.51   8/3 2 TA  35.10+25.06 60.16 566.67   8/9 3 aqua PTA 2023  30.74+22.57+22.57 75.88 642.55   8/16   24.77+19.15+9.29 53.21 695.76   8/23/23 TE, US 2,1 PTA 24.77+19.15+9.29 53.21 748.97

## 2023-08-21 NOTE — FLOWSHEET NOTE
[] [address]  46[address] [facility].  [phone]  F: [phone] [] 204 [facility]  [address]   [address]  P: [phone]  F: [phone] [x] [address]  [address] [facility]  P: [phone]  F: [phone] [] [place]: [phone]  F: [phone] [] [address] [facility]  [address] [facility]   [address]   P: [phone]  F: [phone]  [] [address] [facility].   P: [phone]  F: [phone] [] 205 [facility]  [address]  P: [phone]  F: [phone] [] 224 [facility]  [address] [facility]  [place]: [phone]  F: [phone] [] [address] [place] [facility] [address]  [place]: [phone]  F: [phone]      Physical Therapy Daily Treatment Note    Date:  2023  Patient Name:  Sha Singh    :  1945  MRN: [id number]  Physician: Tania Gee MD                              Insurance: 1. Medicare: no auth req'd; vs bomn - lurdes yr; ded met; coins 20%  2. [facility] AAR medicare supp; follows medicare guidelines, covers part b coins after ded. No pt estimate.   Medical Diagnosis:     Diagnosis   M79.7 (ICD-10-CM) - Fibromyalgia   M16.9 (ICD-10-CM) - Osteoarthritis of hip, unspecified                 Rehab Codes:  M25.552, R26.81, M62.81, Z91.81  Onset date: 2020             Next 's appt.: PRN     Visit# / total visits:   (Medicare recheck due vs. 18)                   Cancels/No Shows:          Subjective:    Pain:  [x] Yes  []

## 2023-08-23 ENCOUNTER — HOSPITAL ENCOUNTER (OUTPATIENT)
Dept: PHYSICAL THERAPY | Facility: CLINIC | Age: 78
Setting detail: THERAPIES SERIES
Discharge: HOME | End: 2023-08-23
Payer: MEDICARE

## 2023-08-23 NOTE — FLOWSHEET NOTE
sxs/pain subside. This discussion was also brought up to pt son who picked her up.      Comments:        [] Next appointment was confirmed    Electronically signed by: Amina Carlisle PTA

## 2023-08-24 ENCOUNTER — HOSPITAL ENCOUNTER (OUTPATIENT)
Dept: GENERAL RADIOLOGY | Age: 78
Discharge: HOME | End: 2023-08-26
Payer: MEDICARE

## 2023-08-24 ENCOUNTER — HOSPITAL ENCOUNTER (OUTPATIENT)
Age: 78
Discharge: HOME | End: 2023-08-26
Payer: MEDICARE

## 2023-08-24 DIAGNOSIS — M62.838 SPASM OF MUSCLE: ICD-10-CM

## 2023-08-24 PROCEDURE — 72100 X-RAY EXAM L-S SPINE 2/3 VWS: CPT

## 2023-08-24 PROCEDURE — 72072 X-RAY EXAM THORAC SPINE 3VWS: CPT

## 2023-08-28 DIAGNOSIS — M79.7 FIBROMYALGIA: Primary | ICD-10-CM

## 2023-08-28 RX ORDER — TRAMADOL HYDROCHLORIDE 50 MG/1
TABLET ORAL
Qty: 21 TABLET | Refills: 0 | Status: SHIPPED | OUTPATIENT
Start: 2023-08-28 | End: 2023-09-27

## 2023-08-28 NOTE — TELEPHONE ENCOUNTER
Pt in severe pain needs more tramadol  or anything that will stop the pain  michelle fraser  She was here Friday Dr Darshan Zelaya gave her script for tramadol she is all out of those pills

## 2023-09-01 ENCOUNTER — TELEPHONE (OUTPATIENT)
Age: 78
End: 2023-09-01

## 2023-09-01 NOTE — TELEPHONE ENCOUNTER
Patient had xray on back 8/24. Result in chart. Asking for result  Both Dr Erasmo Alcantara or Dr Agustin Lopez not in office today.  Can another provider give patient the result

## 2023-09-11 ENCOUNTER — TELEPHONE (OUTPATIENT)
Age: 78
End: 2023-09-11

## 2023-09-12 NOTE — TELEPHONE ENCOUNTER
On 9/11/23 at 900pm, patient called to cancel their appointment with Dr. Renetta Naqvi scheduled at the [facility] location on 9/15/23 at 400pm, because appointment was a scheduled follow up after doing therapy but therapy has not been started yet due to back injury. Patient has called multiple times and can not reach office staff during office hours. Writer instructed patient to notify the office during normal business hours. Please contact patient at [phone] to reschedule or to further discuss.

## 2023-09-14 ENCOUNTER — HOSPITAL ENCOUNTER (OUTPATIENT)
Dept: PHYSICAL THERAPY | Facility: CLINIC | Age: 78
Setting detail: THERAPIES SERIES
Discharge: HOME | End: 2023-09-14
Payer: MEDICARE

## 2023-09-14 PROCEDURE — 97530 THERAPEUTIC ACTIVITIES: CPT

## 2023-09-14 NOTE — PRE-CERTIFICATION NOTE
Medicare Cap   [x] Physical Therapy  [] Speech Therapy  [] Occupational therapy  *PT and Speech caps combine      $2230 Limit for PT and Speech combined  $2230 Limit for OT individually  At the beginning of the month where you expect to go over $2230, please add the [facility] modifier      Patient Name: Patt Clarke: 1945    Note:  This is an estimate of charges billed.      Date of 705 [facility] Name # units/ charge $$$ charge Daily Total Charge Ongoing Total $$$   6/14 Low eval, TE 2 97.02+22.29 119.31 119.31   6/19/23 3 aqua  31.20+23.12+23.12 77.44 196.75   6/21/23 3aqua  31.20+21.12+23.12 77.44 274.19   6/26 3 aqua   77.44 351.63   6/28 3 aqua   77.44 429.07   7/5 3 aqua   77.44 506.51   8/3 2 TA  35.10+25.06 60.16 566.67   8/9 3 aqua PTA 2023  30.74+22.57+22.57 75.88 642.55   8/16   24.77+19.15+9.29 53.21 695.76   8/23/23 TE, US 2,1 PTA 24.77+19.15+9.29 53.21 748.97   9/14 TA 2 35.10+25.06 60.16 809.13

## 2023-09-14 NOTE — PROGRESS NOTES
necessary. [] Other:             Electronically signed by Raf Clarke PT on 9/14/2023 at 3:57 PM    Time in: 4:00 pm   Time Out: 5:30 pm    If you have any questions or concerns, please don't hesitate to call. Thank you for your referral.    Physician Signature:________________________________Date:__________________  By signing above or cosigning this note, I have reviewed this plan of care and certify a need for medically necessary rehabilitation services.      *PLEASE SIGN ABOVE AND FAX BACK ALL PAGES*

## 2023-09-15 ENCOUNTER — HOSPITAL ENCOUNTER (OUTPATIENT)
Dept: PHYSICAL THERAPY | Facility: CLINIC | Age: 78
Setting detail: THERAPIES SERIES
End: 2023-09-15
Payer: MEDICARE

## 2023-09-20 ENCOUNTER — HOSPITAL ENCOUNTER (OUTPATIENT)
Dept: PHYSICAL THERAPY | Facility: CLINIC | Age: 78
Setting detail: THERAPIES SERIES
Discharge: HOME | End: 2023-09-20
Payer: MEDICARE

## 2023-09-20 PROCEDURE — 97113 AQUATIC THERAPY/EXERCISES: CPT

## 2023-09-20 NOTE — PRE-CERTIFICATION NOTE
Medicare Cap   [x] Physical Therapy  [] Speech Therapy  [] Occupational therapy  *PT and Speech caps combine      $2230 Limit for PT and Speech combined  $2230 Limit for OT individually  At the beginning of the month where you expect to go over $2230, please add the [address] modifier      Patient Name: Corrina Nan: 1945    Note:  This is an estimate of charges billed.      Date of 705 [facility] Name # units/ charge $$$ charge Daily Total Charge Ongoing Total $$$   6/14 Low eval, TE 2 97.02+22.29 119.31 119.31   6/19/23 3 aqua  31.20+23.12+23.12 77.44 196.75   6/21/23 3aqua  31.20+21.12+23.12 77.44 274.19   6/26 3 aqua   77.44 351.63   6/28 3 aqua   77.44 429.07   7/5 3 aqua   77.44 506.51   8/3 2 TA  35.10+25.06 60.16 566.67   8/9 3 aqua PTA 2023  30.74+22.57+22.57 75.88 642.55   8/16   24.77+19.15+9.29 53.21 695.76   8/23/23 TE, US 2,1 PTA 24.77+19.15+9.29 53.21 748.97   9/14 TA 2 35.10+25.06 60.16 809.13   9/20/23 3 aqua   75.88 885.01

## 2023-09-20 NOTE — FLOWSHEET NOTE
[x] [facility] Outpatient Rehabilitation & Therapy  [address]  P: [phone]  F: [phone]     Physical Therapy Daily  Aquatic Treatment Note    Date:  2023  Patient Name:  Delisa Rothman    :  1945  MRN: [id number]  Physician: Roberta Currie MD                              Insurance: 1. Medicare: no auth req'd; vs bomn - lurdes yr; ded met; coins 20%  2. [facility] AARP medicare supp; follows medicare guidelines, covers part b coins after ded. No pt estimate. Medical Diagnosis:     Diagnosis   M79.7 (ICD-10-CM) - Fibromyalgia   M16.9 (ICD-10-CM) - Osteoarthritis of hip, unspecified                 Rehab Codes:  M25.552, R26.81, M62.81, Z91.81  Onset date: 2020             Next 's appt.: PRN    Visit# / total visits:                     Cancels/No Shows:     Subjective:    Pain:  [x] Yes  [] No Location: L hip /LB Pain Rating: (0-10 scale) 5/10  Pain altered Tx:  [x] No  [] Yes  Action:  Comments: Pt reports cont pain and fibro flare up. Pt feels pain depends on the weather. Pt cont to report a knot on L hip/thigh. Objective: Fall risk-1:1 into the pool for assist with amb.      KEY  B = Belt G = Gloves N = Noodle   C = Cuffs K = Kickboard P = Paddles   CC = Cervical Collar L = Laps T = Theratube   DB = Dumbells M = Minutes W = Weights     Exercises/Activities  Warm-up/Amb  Dynamic Exercises    Forward 3L N March NT   Sideways 3L N Squat    Backwards  Retro HS curls      Retro SLR    Stretches  Braiding    Gastroc/Soleus  Heel to Toe amb    Hamstring  Toe amb    Hip flexor  Heel amb    Piriformis      SKTC      Pec Stretch      Post Deltoid  Static Exercises UE Back against rail     Shoulder flex/ext 15   Static Exercises LE  Shoulder abd/add 15   Heel/toe raises 15 Shoulder H.  abd/add 15   March 15 Shoulder IR/ER    Mini-squats  Rowing NT   4-way hip  15 Arm Circles    Hamstring curls 15 UT shrugs/rolls    Hip Circles/Fig 8  Scap squeezes

## 2023-09-22 ENCOUNTER — HOSPITAL ENCOUNTER (OUTPATIENT)
Dept: PHYSICAL THERAPY | Facility: CLINIC | Age: 78
Setting detail: THERAPIES SERIES
Discharge: HOME | End: 2023-09-22
Payer: MEDICARE

## 2023-09-22 PROCEDURE — 97113 AQUATIC THERAPY/EXERCISES: CPT

## 2023-09-22 NOTE — PRE-CERTIFICATION NOTE
Medicare Cap   [x] Physical Therapy  [] Speech Therapy  [] Occupational therapy  *PT and Speech caps combine      $2230 Limit for PT and Speech combined  $2230 Limit for OT individually  At the beginning of the month where you expect to go over $2230, please add the 1[facility] modifier      Patient Name: Milly Breaux: 1945    Note:  This is an estimate of charges billed.      Date of 705 [facility] Name # units/ charge $$$ charge Daily Total Charge Ongoing Total $$$   6/14 Low eval, TE 2 97.02+22.29 119.31 119.31   6/19/23 3 aqua  31.20+23.12+23.12 77.44 196.75   6/21/23 3aqua  31.20+21.12+23.12 77.44 274.19   6/26 3 aqua   77.44 351.63   6/28 3 aqua   77.44 429.07   7/5 3 aqua   77.44 506.51   8/3 2 TA  35.10+25.06 60.16 566.67   8/9 3 aqua PTA 2023  30.74+22.57+22.57 75.88 642.55   8/16   24.77+19.15+9.29 53.21 695.76   8/23/23 TE, US 2,1 PTA 24.77+19.15+9.29 53.21 748.97   9/14 TA 2 35.10+25.06 60.16 809.13   9/20/23 3 aqua   75.88 885.01   9/22 3 aqua   75.88 960.89

## 2023-09-22 NOTE — FLOWSHEET NOTE
use of quad cane in order to improve gait speed and reduce risk of falling   Patient will improve (B) tandem to 20 sec in order to improve balance and stability within (B) LE  Patient will report pain as 1/10                    Patient goals: \"Prevent falling\"    Pt. Education:  [x] Yes  [] No  [] Reviewed Prior HEP/Ed  Method of Education: [x] Verbal  [] Demo  [] Written  Comprehension of Education:  [x] Verbalizes understanding. [] Demonstrates understanding. [] Needs review. [] Demonstrates/verbalizes HEP/Ed previously given. Plan: [x] Continue per plan of care.    [] Other:      Time In: 10:00 am            Time Out: 10:55 am    Electronically signed by:  Lou Leon PTA

## 2023-09-28 ENCOUNTER — HOSPITAL ENCOUNTER (OUTPATIENT)
Dept: PHYSICAL THERAPY | Facility: CLINIC | Age: 78
Setting detail: THERAPIES SERIES
Discharge: HOME | End: 2023-09-28
Payer: MEDICARE

## 2023-09-28 PROCEDURE — 97113 AQUATIC THERAPY/EXERCISES: CPT

## 2023-09-28 NOTE — PRE-CERTIFICATION NOTE
Medicare Cap   [x] Physical Therapy  [] Speech Therapy  [] Occupational therapy  *PT and Speech caps combine      $2230 Limit for PT and Speech combined  $2230 Limit for OT individually  At the beginning of the month where you expect to go over $2230, please add the 1[facility] modifier      Patient Name: Miatli Morales: 1945    Note:  This is an estimate of charges billed.      Date of 705 [facility] Name # units/ charge $$$ charge Daily Total Charge Ongoing Total $$$   6/14 Low eval, TE 2 97.02+22.29 119.31 119.31   6/19/23 3 aqua  31.20+23.12+23.12 77.44 196.75   6/21/23 3aqua  31.20+21.12+23.12 77.44 274.19   6/26 3 aqua   77.44 351.63   6/28 3 aqua   77.44 429.07   7/5 3 aqua   77.44 506.51   8/3 2 TA  35.10+25.06 60.16 566.67   8/9 3 aqua PTA 2023  30.74+22.57+22.57 75.88 642.55   8/16   24.77+19.15+9.29 53.21 695.76   8/23/23 TE, US 2,1 PTA 24.77+19.15+9.29 53.21 748.97   9/14 TA 2 35.10+25.06 60.16 809.13   9/20/23 3 aqua   75.88 885.01   9/22 3 aqua   75.88 960.89   9/28 3 aqua   75.88 1036.77

## 2023-09-28 NOTE — FLOWSHEET NOTE
[x] [facility] Outpatient Rehabilitation & Therapy  [address]  P: [phone]  F: [phone]     Physical Therapy Daily  Aquatic Treatment Note    Date:  2023  Patient Name:  Cee Larios    :  1945  MRN: [id number]  Physician: Nay Sanford MD                              Insurance: 1. Medicare: no auth req'd; vs bomn - lurdes yr; ded met; coins 20%  2. [facility] AARP medicare supp; follows medicare guidelines, covers part b coins after ded. No pt estimate. Medical Diagnosis:     Diagnosis   M79.7 (ICD-10-CM) - Fibromyalgia   M16.9 (ICD-10-CM) - Osteoarthritis of hip, unspecified                 Rehab Codes:  M25.552, R26.81, M62.81, Z91.81  Onset date: 2020             Next 's appt.: PRN    Visit# / total visits: 15/18                    Cancels/No Shows:     Subjective:    Pain:  [x] Yes  [] No Location: L hip /LB Pain Rating: (0-10 scale) 4/10  Pain altered Tx:  [x] No  [] Yes  Action:  Comments: States her L hip is always achy. No issues following last treatment. Objective: Fall risk-1:1 into the pool for assist with amb.      KEY  B = Belt G = Gloves N = Noodle   C = Cuffs K = Kickboard P = Paddles   CC = Cervical Collar L = Laps T = Theratube   DB = Dumbells M = Minutes W = Weights     Exercises/Activities  Warm-up/Amb  Dynamic Exercises    Forward 3L N March NT   Sideways 3L N Squat    Backwards  Retro HS curls      Retro SLR    Stretches  Braiding    Gastroc/Soleus  Heel to Toe amb    Hamstring  Toe amb    Hip flexor  Heel amb    Piriformis      SKTC      Pec Stretch      Post Deltoid  Static Exercises UE Back against rail     Shoulder flex/ext 2x10   Static Exercises LE  Shoulder abd/add 2x10   Heel/toe raises 2x10 Shoulder H.  abd/add 2x10   Marches 2x10 Shoulder IR/ER    Mini-squats  Rowing NT   4-way hip  2x10 Arm Circles    Hamstring curls 2x10 UT shrugs/rolls    Hip Circles/Fig 8  Scap squeezes    Ankle ROM  Diagonals 1/2    Lunges

## 2023-10-04 ENCOUNTER — HOSPITAL ENCOUNTER (OUTPATIENT)
Dept: PHYSICAL THERAPY | Facility: CLINIC | Age: 78
Setting detail: THERAPIES SERIES
Discharge: HOME | End: 2023-10-04
Payer: MEDICARE

## 2023-10-04 PROCEDURE — 97113 AQUATIC THERAPY/EXERCISES: CPT

## 2023-10-04 NOTE — PRE-CERTIFICATION NOTE
Medicare Cap   [x] Physical Therapy  [] Speech Therapy  [] Occupational therapy  *PT and Speech caps combine      $2230 Limit for PT and Speech combined  $2230 Limit for OT individually  At the beginning of the month where you expect to go over $2230, please add the 1111 [facility] modifier      Patient Name: Betsy Cason: 1945    Note:  This is an estimate of charges billed.      Date of 705 [facility] Name # units/ charge $$$ charge Daily Total Charge Ongoing Total $$$   6/14 Low eval, TE 2 97.02+22.29 119.31 119.31   6/19/23 3 aqua  31.20+23.12+23.12 77.44 196.75   6/21/23 3aqua  31.20+21.12+23.12 77.44 274.19   6/26 3 aqua   77.44 351.63   6/28 3 aqua   77.44 429.07   7/5 3 aqua   77.44 506.51   8/3 2 TA  35.10+25.06 60.16 566.67   8/9 3 aqua PTA 2023  30.74+22.57+22.57 75.88 642.55   8/16   24.77+19.15+9.29 53.21 695.76   8/23/23 TE, US 2,1 PTA 24.77+19.15+9.29 53.21 748.97   9/14 TA 2 35.10+25.06 60.16 809.13   9/20/23 3 aqua   75.88 885.01   9/22 3 aqua   75.88 960.89   9/28 3 aqua   75.88 1036.77   10/4 3 aqua   75.88 1112.65

## 2023-10-04 NOTE — FLOWSHEET NOTE
[x] [facility]  [address]  P: [phone]  F: [phone]     Physical Therapy Daily  Aquatic Treatment Note    Date:  10/4/2023  Patient Name:  Chiara Álvarez    :  1945  MRN: [id number]  Physician: Estephania Abbasi MD                              Insurance: 1. Medicare: no auth req'd; vs bomn - lurdes yr; ded met; coins 20%  2. [facility] AARP medicare supp; follows medicare guidelines, covers part b coins after ded. No pt estimate. Medical Diagnosis:     Diagnosis   M79.7 (ICD-10-CM) - Fibromyalgia   M16.9 (ICD-10-CM) - Osteoarthritis of hip, unspecified                 Rehab Codes:  M25.552, R26.81, M62.81, Z91.81  Onset date: 2020             Next 's appt.: PRN    Visit# / total visits:                     Cancels/No Shows:     Subjective:    Pain:  [x] Yes  [] No Location: L hip /LB Pain Rating: (0-10 scale) 5-6/10  Pain altered Tx:  [x] No  [] Yes  Action:  Comments: Pt reports cont hip pain and feels tight in her LB today. Objective: Fall risk-1:1 into the pool for assist with amb.      KEY  B = Belt G = Gloves N = Noodle   C = Cuffs K = Kickboard P = Paddles   CC = Cervical Collar L = Laps T = Theratube   DB = Dumbells M = Minutes W = Weights     Exercises/Activities  Warm-up/Amb 10/4 Dynamic Exercises 10/4   Forward 3L N March 2L N   Sideways 3L N Squat    Backwards 2L N Retro HS curls      Retro SLR    Stretches  Braiding    Gastroc/Soleus  Heel to Toe amb    Hamstring  Toe amb    Hip flexor  Heel amb    Piriformis      SKTC      Pec Stretch      Post Deltoid  Static Exercises UE Back against rail     Shoulder flex/ext 2x10   Static Exercises LE  Shoulder abd/add 2x10   Heel/toe raises 2x10 Shoulder H.  abd/add 2x10   Marches 2x10 Shoulder IR/ER    Mini-squats  Rowing 15   4-way hip  2x10 Arm Circles    Hamstring curls 2x10 UT shrugs/rolls    Hip Circles/Fig 8  Scap squeezes    Ankle ROM  Diagonals 1/2    Lunges   Elbow

## 2023-10-11 ENCOUNTER — HOSPITAL ENCOUNTER (OUTPATIENT)
Dept: PHYSICAL THERAPY | Facility: CLINIC | Age: 78
Setting detail: THERAPIES SERIES
Discharge: HOME | End: 2023-10-11
Payer: MEDICARE

## 2023-10-11 PROCEDURE — 97113 AQUATIC THERAPY/EXERCISES: CPT

## 2023-10-11 NOTE — FLOWSHEET NOTE
[x] [facility]  [address]  P: [phone]  F: [phone]     Physical Therapy Daily  Aquatic Treatment Note    Date:  10/11/2023  Patient Name:  Carol Rosen    :  1945  MRN: [id number]  Physician: Elder Rodriguez MD                              Insurance: 1. Medicare: no auth req'd; vs bomn - lurdes yr; ded met; coins 20%  2. [facility] AARP medicare supp; follows medicare guidelines, covers part b coins after ded. No pt estimate. Medical Diagnosis:     Diagnosis   M79.7 (ICD-10-CM) - Fibromyalgia   M16.9 (ICD-10-CM) - Osteoarthritis of hip, unspecified                 Rehab Codes:  M25.552, R26.81, M62.81, Z91.81  Onset date: 2020             Next 's appt.: PRN    Visit# / total visits:                     Cancels/No Shows:     Subjective:    Pain:  [x] Yes  [] No Location: L hip /LB Pain Rating: (0-10 scale) 4/10  Pain altered Tx:  [x] No  [] Yes  Action:  Comments: Pt reports cont hip pain and feels tight in her LB today. Objective: Fall risk-1:1 into the pool for assist with amb.      KEY  B = Belt G = Gloves N = Noodle   C = Cuffs K = Kickboard P = Paddles   CC = Cervical Collar L = Laps T = Theratube   DB = Dumbells M = Minutes W = Weights     Exercises/Activities  Warm-up/Amb 10/11 Dynamic Exercises 10/11   Forward 3L N March 2L N--held   Sideways 3L N Squat    Backwards 2L N Retro HS curls      Retro SLR    Stretches  Braiding    Gastroc/Soleus  Heel to Toe amb    Hamstring  Toe amb    Hip flexor  Heel amb    Piriformis      SKTC      Pec Stretch      Post Deltoid  Static Exercises UE Back against rail     Shoulder flex/ext 2x10   Static Exercises LE  Shoulder abd/add 2x10   Heel/toe raises 2x10 Shoulder H.  abd/add 2x10   Marches 2x10 Shoulder IR/ER    Mini-squats  Rowing 15   4-way hip  2x10 Arm Circles    Hamstring curls 2x10 UT shrugs/rolls    Hip Circles/Fig 8  Scap squeezes    Ankle ROM  Diagonals 1/2    Lunges

## 2023-10-11 NOTE — PRE-CERTIFICATION NOTE
Medicare Cap   [x] Physical Therapy  [] Speech Therapy  [] Occupational therapy  *PT and Speech caps combine      $2230 Limit for PT and Speech combined  $2230 Limit for OT individually  At the beginning of the month where you expect to go over $2230, please add the [facility] modifier      Patient Name: Jacqueline Monge: 1945    Note:  This is an estimate of charges billed.      Date of 705 [facility] Name # units/ charge $$$ charge Daily Total Charge Ongoing Total $$$   6/14 Low eval, TE 2 97.02+22.29 119.31 119.31   6/19/23 3 aqua  31.20+23.12+23.12 77.44 196.75   6/21/23 3aqua  31.20+21.12+23.12 77.44 274.19   6/26 3 aqua   77.44 351.63   6/28 3 aqua   77.44 429.07   7/5 3 aqua   77.44 506.51   8/3 2 TA  35.10+25.06 60.16 566.67   8/9 3 aqua PTA 2023  30.74+22.57+22.57 75.88 642.55   8/16   24.77+19.15+9.29 53.21 695.76   8/23/23 TE, US 2,1 PTA 24.77+19.15+9.29 53.21 748.97   9/14 TA 2 35.10+25.06 60.16 809.13   9/20/23 3 aqua   75.88 885.01   9/22 3 aqua   75.88 960.89   9/28 3 aqua   75.88 1036.77   10/4 3 aqua   75.88 1112.65   10/11 3 aqua   75.88 1188.53

## 2023-10-16 ENCOUNTER — HOSPITAL ENCOUNTER (OUTPATIENT)
Dept: PHYSICAL THERAPY | Facility: CLINIC | Age: 78
Setting detail: THERAPIES SERIES
Discharge: HOME | End: 2023-10-16
Payer: MEDICARE

## 2023-10-16 PROCEDURE — 97113 AQUATIC THERAPY/EXERCISES: CPT

## 2023-10-16 NOTE — PRE-CERTIFICATION NOTE
Medicare Cap   [x] Physical Therapy  [] Speech Therapy  [] Occupational therapy  *PT and Speech caps combine      $2230 Limit for PT and Speech combined  $2230 Limit for OT individually  At the beginning of the month where you expect to go over $2230, please add the 1[address] [facility] modifier      Patient Name: Valeria Waterman: 1945    Note:  This is an estimate of charges billed.      Date of 705 [facility] Name # units/ charge $$$ charge Daily Total Charge Ongoing Total $$$   6/14 Low eval, TE 2 97.02+22.29 119.31 119.31   6/19/23 3 aqua  31.20+23.12+23.12 77.44 196.75   6/21/23 3aqua  31.20+21.12+23.12 77.44 274.19   6/26 3 aqua   77.44 351.63   6/28 3 aqua   77.44 429.07   7/5 3 aqua   77.44 506.51   8/3 2 TA  35.10+25.06 60.16 566.67   8/9 3 aqua PTA 2023  30.74+22.57+22.57 75.88 642.55   8/16   24.77+19.15+9.29 53.21 695.76   8/23/23 TE, US 2,1 PTA 24.77+19.15+9.29 53.21 748.97   9/14 TA 2 35.10+25.06 60.16 809.13   9/20/23 3 aqua   75.88 885.01   9/22 3 aqua   75.88 960.89   9/28 3 aqua   75.88 1036.77   10/4 3 aqua   75.88 1112.65   10/11 3 aqua   75.88 1188.53   10/16 3 aqua   75.88 1264.41

## 2023-10-16 NOTE — FLOWSHEET NOTE
improve balance and stability within (B) LE  Patient will report pain as 1/10                    Patient goals: \"Prevent falling\"    Pt. Education:  [x] Yes  [] No  [] Reviewed Prior HEP/Ed  Method of Education: [x] Verbal  [] Demo  [] Written  Comprehension of Education:  [x] Verbalizes understanding. [] Demonstrates understanding. [] Needs review. [] Demonstrates/verbalizes HEP/Ed previously given. Plan: [x] Continue per plan of care.    [] Other:     Time In: 1:15 pm          Time Out: 2: 05pm    Electronically signed by:  Crispin Burgos PTA

## 2023-10-17 VITALS
SYSTOLIC BLOOD PRESSURE: 110 MMHG | WEIGHT: 133 LBS | HEIGHT: 65 IN | BODY MASS INDEX: 22.16 KG/M2 | TEMPERATURE: 97.5 F | RESPIRATION RATE: 14 BRPM | DIASTOLIC BLOOD PRESSURE: 62 MMHG | OXYGEN SATURATION: 96 %

## 2023-10-17 DIAGNOSIS — M16.9 OSTEOARTHRITIS OF HIP, UNSPECIFIED LATERALITY, UNSPECIFIED OSTEOARTHRITIS TYPE: ICD-10-CM

## 2023-10-17 DIAGNOSIS — G89.29 OTHER CHRONIC PAIN: ICD-10-CM

## 2023-10-17 DIAGNOSIS — I48.91 ATRIAL FIBRILLATION, UNSPECIFIED TYPE (HCC): ICD-10-CM

## 2023-10-18 ENCOUNTER — APPOINTMENT (OUTPATIENT)
Dept: PHYSICAL THERAPY | Facility: CLINIC | Age: 78
End: 2023-10-18
Payer: MEDICARE

## 2023-10-18 ENCOUNTER — HOSPITAL ENCOUNTER (OUTPATIENT)
Age: 78
Setting detail: SPECIMEN
Discharge: HOME | End: 2023-10-18

## 2023-10-18 ENCOUNTER — OFFICE VISIT (OUTPATIENT)
Age: 78
End: 2023-10-18
Payer: MEDICARE

## 2023-10-18 VITALS
HEIGHT: 65 IN | TEMPERATURE: 97.3 F | RESPIRATION RATE: 96 BRPM | HEART RATE: 65 BPM | BODY MASS INDEX: 22.06 KG/M2 | SYSTOLIC BLOOD PRESSURE: 132 MMHG | WEIGHT: 132.4 LBS | DIASTOLIC BLOOD PRESSURE: 70 MMHG

## 2023-10-18 DIAGNOSIS — G47.19 EXCESSIVE DAYTIME SLEEPINESS: ICD-10-CM

## 2023-10-18 DIAGNOSIS — E78.2 MIXED HYPERLIPIDEMIA: ICD-10-CM

## 2023-10-18 DIAGNOSIS — M16.9 OSTEOARTHRITIS OF HIP, UNSPECIFIED LATERALITY, UNSPECIFIED OSTEOARTHRITIS TYPE: ICD-10-CM

## 2023-10-18 DIAGNOSIS — G89.29 OTHER CHRONIC PAIN: ICD-10-CM

## 2023-10-18 DIAGNOSIS — I10 ESSENTIAL HYPERTENSION: ICD-10-CM

## 2023-10-18 DIAGNOSIS — I48.11 LONGSTANDING PERSISTENT ATRIAL FIBRILLATION (HCC): Primary | ICD-10-CM

## 2023-10-18 LAB
ALBUMIN SERPL-MCNC: 4 G/DL (ref 3.5–5.2)
ALBUMIN/GLOB SERPL: 1.3 {RATIO} (ref 1–2.5)
ALP SERPL-CCNC: 92 U/L (ref 35–104)
ALT SERPL-CCNC: 16 U/L (ref 5–33)
ANION GAP SERPL CALCULATED.3IONS-SCNC: 11 MMOL/L (ref 9–17)
AST SERPL-CCNC: 19 U/L
BASOPHILS # BLD: 0.06 K/UL (ref 0–0.2)
BASOPHILS NFR BLD: 1 % (ref 0–2)
BILIRUB DIRECT SERPL-MCNC: <0.1 MG/DL
BILIRUB INDIRECT SERPL-MCNC: ABNORMAL MG/DL (ref 0–1)
BILIRUB SERPL-MCNC: 0.2 MG/DL (ref 0.3–1.2)
BUN SERPL-MCNC: 34 MG/DL (ref 8–23)
CALCIUM SERPL-MCNC: 9.4 MG/DL (ref 8.6–10.4)
CHLORIDE SERPL-SCNC: 101 MMOL/L (ref 98–107)
CHOLEST SERPL-MCNC: 169 MG/DL
CHOLESTEROL/HDL RATIO: 2.7
CO2 SERPL-SCNC: 25 MMOL/L (ref 20–31)
CREAT SERPL-MCNC: 2.1 MG/DL (ref 0.5–0.9)
EOSINOPHIL # BLD: 0.1 K/UL (ref 0–0.44)
EOSINOPHILS RELATIVE PERCENT: 1 % (ref 1–4)
ERYTHROCYTE [DISTWIDTH] IN BLOOD BY AUTOMATED COUNT: 13.1 % (ref 11.8–14.4)
GFR SERPL CREATININE-BSD FRML MDRD: 23 ML/MIN/1.73M2
GLUCOSE SERPL-MCNC: 91 MG/DL (ref 70–99)
HCT VFR BLD AUTO: 42.4 % (ref 36.3–47.1)
HDLC SERPL-MCNC: 62 MG/DL
HGB BLD-MCNC: 13 G/DL (ref 11.9–15.1)
IMM GRANULOCYTES # BLD AUTO: 0.03 K/UL (ref 0–0.3)
IMM GRANULOCYTES NFR BLD: 0 %
LDLC SERPL CALC-MCNC: 72 MG/DL (ref 0–130)
LYMPHOCYTES NFR BLD: 2.25 K/UL (ref 1.1–3.7)
LYMPHOCYTES RELATIVE PERCENT: 29 % (ref 24–43)
MCH RBC QN AUTO: 28.8 PG (ref 25.2–33.5)
MCHC RBC AUTO-ENTMCNC: 30.7 G/DL (ref 28.4–34.8)
MCV RBC AUTO: 94 FL (ref 82.6–102.9)
MONOCYTES NFR BLD: 0.49 K/UL (ref 0.1–1.2)
MONOCYTES NFR BLD: 6 % (ref 3–12)
NEUTROPHILS NFR BLD: 63 % (ref 36–65)
NEUTS SEG NFR BLD: 4.73 K/UL (ref 1.5–8.1)
NRBC BLD-RTO: 0 PER 100 WBC
PLATELET # BLD AUTO: 218 K/UL (ref 138–453)
PMV BLD AUTO: 10.1 FL (ref 8.1–13.5)
POTASSIUM SERPL-SCNC: 4.8 MMOL/L (ref 3.7–5.3)
PROT SERPL-MCNC: 7 G/DL (ref 6.4–8.3)
RBC # BLD AUTO: 4.51 M/UL (ref 3.95–5.11)
SODIUM SERPL-SCNC: 137 MMOL/L (ref 135–144)
T4 FREE SERPL-MCNC: 1.1 NG/DL (ref 0.9–1.7)
TRIGL SERPL-MCNC: 175 MG/DL
TSH SERPL DL<=0.05 MIU/L-ACNC: 6.8 UIU/ML (ref 0.3–5)
WBC OTHER # BLD: 7.7 K/UL (ref 3.5–11.3)

## 2023-10-18 PROCEDURE — G8400 PT W/DXA NO RESULTS DOC: HCPCS | Performed by: FAMILY MEDICINE

## 2023-10-18 PROCEDURE — G8420 CALC BMI NORM PARAMETERS: HCPCS | Performed by: FAMILY MEDICINE

## 2023-10-18 PROCEDURE — 1036F TOBACCO NON-USER: CPT | Performed by: FAMILY MEDICINE

## 2023-10-18 PROCEDURE — G8427 DOCREV CUR MEDS BY ELIG CLIN: HCPCS | Performed by: FAMILY MEDICINE

## 2023-10-18 PROCEDURE — 99214 OFFICE O/P EST MOD 30 MIN: CPT | Performed by: FAMILY MEDICINE

## 2023-10-18 PROCEDURE — 3078F DIAST BP <80 MM HG: CPT | Performed by: FAMILY MEDICINE

## 2023-10-18 PROCEDURE — G8484 FLU IMMUNIZE NO ADMIN: HCPCS | Performed by: FAMILY MEDICINE

## 2023-10-18 PROCEDURE — 1123F ACP DISCUSS/DSCN MKR DOCD: CPT | Performed by: FAMILY MEDICINE

## 2023-10-18 PROCEDURE — 1090F PRES/ABSN URINE INCON ASSESS: CPT | Performed by: FAMILY MEDICINE

## 2023-10-18 PROCEDURE — 3075F SYST BP GE 130 - 139MM HG: CPT | Performed by: FAMILY MEDICINE

## 2023-10-18 SDOH — ECONOMIC STABILITY: FOOD INSECURITY: WITHIN THE PAST 12 MONTHS, THE FOOD YOU BOUGHT JUST DIDN'T LAST AND YOU DIDN'T HAVE MONEY TO GET MORE.: NEVER TRUE

## 2023-10-18 SDOH — ECONOMIC STABILITY: HOUSING INSECURITY
IN THE LAST 12 MONTHS, WAS THERE A TIME WHEN YOU DID NOT HAVE A STEADY PLACE TO SLEEP OR SLEPT IN A SHELTER (INCLUDING NOW)?: NO

## 2023-10-18 SDOH — ECONOMIC STABILITY: INCOME INSECURITY: HOW HARD IS IT FOR YOU TO PAY FOR THE VERY BASICS LIKE FOOD, HOUSING, MEDICAL CARE, AND HEATING?: NOT HARD AT ALL

## 2023-10-18 SDOH — ECONOMIC STABILITY: FOOD INSECURITY: WITHIN THE PAST 12 MONTHS, YOU WORRIED THAT YOUR FOOD WOULD RUN OUT BEFORE YOU GOT MONEY TO BUY MORE.: NEVER TRUE

## 2023-10-18 ASSESSMENT — PATIENT HEALTH QUESTIONNAIRE - PHQ9
2. FEELING DOWN, DEPRESSED OR HOPELESS: 0
SUM OF ALL RESPONSES TO PHQ QUESTIONS 1-9: 0
SUM OF ALL RESPONSES TO PHQ9 QUESTIONS 1 & 2: 0
1. LITTLE INTEREST OR PLEASURE IN DOING THINGS: 0
SUM OF ALL RESPONSES TO PHQ QUESTIONS 1-9: 0

## 2023-10-18 NOTE — PROGRESS NOTES
[address] [address]     Date of Visit:  10/18/2023  Patient Name: Lindsey Camacho   Patient :  1945     CHIEF COMPLAINT/HPI:     Chief Complaint   Patient presents with    Check-Up     Check up. HPI      Keshia Mcdowell, 78 y.o. presents today for follow-up of atrial fibrillation, chronic pain, arthritis, hypertension, and hyperlipidemia. She reports ongoing fatigue. She had been taking naps but is no longer doing so. She reports being diagnosed with sleep apnea years ago and was started on CPAP. She was previously evaluated by Dr. Gina Hutton. She reports having thyroid issues since she was a teenager. She reports daily pain, especially with weather change. She reports being happy when her pain is down to 3 or 4. REVIEW OF SYSTEM      Review of Systems:   Constitutional:  Negative for chills, fatigue, fever and unexpected weight change. Eyes:  Negative for visual disturbance. Respiratory:  Negative for cough, chest tightness, shortness of breath and wheezing. Cardiovascular:  Negative for chest pain, palpitations and leg swelling. Gastrointestinal:  Negative for abdominal distention, abdominal pain, blood in stool, constipation, diarrhea, nausea and vomiting. Genitourinary:  Negative for dysuria, hematuria and urgency. Musculoskeletal:  Negative for back pain, neck pain and neck stiffness. Skin:  Negative for rash and wound. Neurological:  Negative for syncope, weakness, light-headedness and headaches. Hematological:  Negative for adenopathy. Does not bruise/bleed easily. Psychiatric/Behavioral:  Negative for suicidal ideas. The patient is not nervous/anxious.       REVIEWED INFORMATION      Allergies   Allergen Reactions    Codeine     Codeine Itching    Corn Oil      Other reaction(s): Unknown    Formaldehyde Hives and Other (See Comments)     Other reaction(s): Unknown

## 2023-10-20 ENCOUNTER — HOSPITAL ENCOUNTER (OUTPATIENT)
Dept: PHYSICAL THERAPY | Facility: CLINIC | Age: 78
Setting detail: THERAPIES SERIES
Discharge: HOME | End: 2023-10-20
Payer: MEDICARE

## 2023-10-20 ENCOUNTER — TELEPHONE (OUTPATIENT)
Age: 78
End: 2023-10-20

## 2023-10-20 DIAGNOSIS — G47.33 OSA (OBSTRUCTIVE SLEEP APNEA): Primary | ICD-10-CM

## 2023-10-20 PROCEDURE — 97113 AQUATIC THERAPY/EXERCISES: CPT

## 2023-10-20 PROCEDURE — 97530 THERAPEUTIC ACTIVITIES: CPT

## 2023-10-20 NOTE — FLOWSHEET NOTE
No  [] Reviewed Prior HEP/Ed  Method of Education: [x] Verbal  [] Demo  [] Written  Comprehension of Education:  [x] Verbalizes understanding. [] Demonstrates understanding. [] Needs review. [] Demonstrates/verbalizes HEP/Ed previously given. Plan: [x] Continue per plan of care.    [] Other:     Time In: 11:20 am          Time Out: 11:45 pm    Electronically signed by:  Divya Davenport PTA

## 2023-10-20 NOTE — TELEPHONE ENCOUNTER
Pt needs a ref to see Dr Gerard Valera for a sleep study. Notify pt when done [phone]. This is Dr Cr Mejia patient, he told pt to just call and make an appt. . She needs ref.

## 2023-10-20 NOTE — PROGRESS NOTES
Stimulation Attended  [id number]   [x] Instruction in HEP  [] Lumbar/Cervical Traction  [id number]   [x] Aquatic Therapy   [id number] [x] Cold/hotpack    [] Massage   [id number]      [] Dry Needling, 1 or 2 muscles  24601   [] Biofeedback, first 15 minutes   86035  [] Biofeedback, additional 15 minutes   36437 [] Dry Needling, 3 or more muscles  65395       Patient Status:     [x] Continue per initial plan of care. [x] Additional visits necessary. [] Other:     Additional Visits Required: 2x/week for 8 visits         Electronically signed by Ana María Hernández PT on 10/20/2023 at 12:14 PM    Time in: 12:10 pm   Time Out: 12:45 pm    If you have any questions or concerns, please don't hesitate to call. Thank you for your referral.    Physician Signature:________________________________Date:__________________  By signing above or cosigning this note, I have reviewed this plan of care and certify a need for medically necessary rehabilitation services.      *PLEASE SIGN ABOVE AND FAX BACK ALL PAGES*

## 2023-10-20 NOTE — PRE-CERTIFICATION NOTE
Medicare Cap   [x] Physical Therapy  [] Speech Therapy  [] Occupational therapy  *PT and Speech caps combine      $2230 Limit for PT and Speech combined  $2230 Limit for OT individually  At the beginning of the month where you expect to go over $2230, please add the [address] [facility] modifier      Patient Name: Ro Dyer: 1945    Note:  This is an estimate of charges billed.      Date of 705 [facility] Name # units/ charge $$$ charge Daily Total Charge Ongoing Total $$$   6/14 Low eval, TE 2 97.02+22.29 119.31 119.31   6/19/23 3 aqua  31.20+23.12+23.12 77.44 196.75   6/21/23 3aqua  31.20+21.12+23.12 77.44 274.19   6/26 3 aqua   77.44 351.63   6/28 3 aqua   77.44 429.07   7/5 3 aqua   77.44 506.51   8/3 2 TA  35.10+25.06 60.16 566.67   8/9 3 aqua PTA 2023  30.74+22.57+22.57 75.88 642.55   8/16   24.77+19.15+9.29 53.21 695.76   8/23/23 TE, US 2,1 PTA 24.77+19.15+9.29 53.21 748.97   9/14 TA 2 35.10+25.06 60.16 809.13   9/20/23 3 aqua   75.88 885.01   9/22 3 aqua   75.88 960.89   9/28 3 aqua   75.88 1036.77   10/4 3 aqua   75.88 1112.65   10/11 3 aqua   75.88 1188.53   10/16 3 aqua   75.88 1264.41   10/20 2 aqua    53.31 1317.72   10/20 2 TA   60.16 1377.88

## 2023-10-20 NOTE — PRE-CERTIFICATION NOTE
Medicare Cap   [x] Physical Therapy  [] Speech Therapy  [] Occupational therapy  *PT and Speech caps combine      $2230 Limit for PT and Speech combined  $2230 Limit for OT individually  At the beginning of the month where you expect to go over $2230, please add the [address] [facility] modifier      Patient Name: Jae Fuel: 1945    Note:  This is an estimate of charges billed.      Date of 705 [facility] Name # units/ charge $$$ charge Daily Total Charge Ongoing Total $$$   6/14 Low eval, TE 2 97.02+22.29 119.31 119.31   6/19/23 3 aqua  31.20+23.12+23.12 77.44 196.75   6/21/23 3aqua  31.20+21.12+23.12 77.44 274.19   6/26 3 aqua   77.44 351.63   6/28 3 aqua   77.44 429.07   7/5 3 aqua   77.44 506.51   8/3 2 TA  35.10+25.06 60.16 566.67   8/9 3 aqua PTA 2023  30.74+22.57+22.57 75.88 642.55   8/16   24.77+19.15+9.29 53.21 695.76   8/23/23 TE, US 2,1 PTA 24.77+19.15+9.29 53.21 748.97   9/14 TA 2 35.10+25.06 60.16 809.13   9/20/23 3 aqua   75.88 885.01   9/22 3 aqua   75.88 960.89   9/28 3 aqua   75.88 1036.77   10/4 3 aqua   75.88 1112.65   10/11 3 aqua   75.88 1188.53   10/16 3 aqua   75.88 1264.41   10/20 2 aqua    53.31 1317.72

## 2023-10-24 ENCOUNTER — TELEPHONE (OUTPATIENT)
Age: 78
End: 2023-10-24

## 2023-10-24 RX ORDER — MECLIZINE HCL 12.5 MG/1
12.5 TABLET ORAL 3 TIMES DAILY PRN
Qty: 30 TABLET | Refills: 0 | Status: SHIPPED | OUTPATIENT
Start: 2023-10-24 | End: 2023-11-03

## 2023-10-24 NOTE — TELEPHONE ENCOUNTER
Pt is experiencing  dizziness and nauseated and would like some thing sent in she laying in bed was bad for couple days   Liz Haney

## 2023-10-25 ENCOUNTER — HOSPITAL ENCOUNTER (OUTPATIENT)
Dept: PHYSICAL THERAPY | Facility: CLINIC | Age: 78
Setting detail: THERAPIES SERIES
Discharge: HOME | End: 2023-10-25
Payer: MEDICARE

## 2023-10-25 PROCEDURE — 97113 AQUATIC THERAPY/EXERCISES: CPT

## 2023-10-25 NOTE — FLOWSHEET NOTE
[x] 12 [facility]  [address] [facility]  P: [phone]  F: [phone]     Physical Therapy Daily  Aquatic Treatment Note    Date:  10/25/2023  Patient Name:  Geovanna Duran    :  1945  MRN: [id number]  Physician: Jono Edmonds MD                              Insurance: 1. Medicare: no auth req'd; vs bomn - lurdes yr; ded met; coins 20%  2. [facility] AARP medicare supp; follows medicare guidelines, covers part b coins after ded. No pt estimate. Medical Diagnosis:     Diagnosis   M79.7 (ICD-10-CM) - Fibromyalgia   M16.9 (ICD-10-CM) - Osteoarthritis of hip, unspecified                 Rehab Codes:  M25.552, R26.81, M62.81, Z91.81  Onset date: 2020             Next 's appt.: 10/18/23    Visit# / total visits:                     Cancels/No Shows:     Subjective:    Pain:  [x] Yes  [] No Location: L hip /LB Pain Rating: (0-10 scale) 2/10  Pain altered Tx:  [x] No  [] Yes  Action:  Comments: Pt reports only L hip pain today, occ sharp when she steps wrong. Objective: Fall risk-1:1 into the pool for assist with amb.      KEY  B = Belt G = Gloves N = Noodle   C = Cuffs K = Kickboard P = Paddles   CC = Cervical Collar L = Laps T = Theratube   DB = Dumbells M = Minutes W = Weights     Exercises/Activities  Warm-up/Amb 10/25 Dynamic Exercises 10/25   Forward 3L N March 3L   Sideways 3L N Squat    Backwards 3L N Retro HS curls      Retro SLR    Stretches  Braiding    Gastroc/Soleus  Heel to Toe amb    Hamstring  Toe amb    Hip flexor  Heel amb    Piriformis      SKTC      Pec Stretch      Post Deltoid  Static Exercises UE Back against rail     Shoulder flex/ext 20   Static Exercises LE  Shoulder abd/add 20   Heel/toe raises 20 Shoulder H.  abd/add 20   March 20 Shoulder IR/ER    Mini-squats 20 Rowing 20   4-way hip  20 Arm Circles    Hamstring curls 20 UT shrugs/rolls    Hip Circles/Fig 8  Scap squeezes    Ankle ROM  Diagonals 1/2    Lunges   Elbow

## 2023-10-25 NOTE — PRE-CERTIFICATION NOTE
Medicare Cap   [x] Physical Therapy  [] Speech Therapy  [] Occupational therapy  *PT and Speech caps combine      $2230 Limit for PT and Speech combined  $2230 Limit for OT individually  At the beginning of the month where you expect to go over $2230, please add the [address] [facility] modifier      Patient Name: Caroline Vogt: 1945    Note:  This is an estimate of charges billed.      Date of 705 [facility] Name # units/ charge $$$ charge Daily Total Charge Ongoing Total $$$   6/14 Low eval, TE 2 97.02+22.29 119.31 119.31   6/19/23 3 aqua  31.20+23.12+23.12 77.44 196.75   6/21/23 3aqua  31.20+21.12+23.12 77.44 274.19   6/26 3 aqua   77.44 351.63   6/28 3 aqua   77.44 429.07   7/5 3 aqua   77.44 506.51   8/3 2 TA  35.10+25.06 60.16 566.67   8/9 3 aqua PTA 2023  30.74+22.57+22.57 75.88 642.55   8/16   24.77+19.15+9.29 53.21 695.76   8/23/23 TE, US 2,1 PTA 24.77+19.15+9.29 53.21 748.97   9/14 TA 2 35.10+25.06 60.16 809.13   9/20/23 3 aqua   75.88 885.01   9/22 3 aqua   75.88 960.89   9/28 3 aqua   75.88 1036.77   10/4 3 aqua   75.88 1112.65   10/11 3 aqua   75.88 1188.53   10/16 3 aqua   75.88 1264.41   10/20 2 aqua    53.31 1317.72   10/20 2 TA   60.16 1377.88   10/25/23 3 aqua   75.88 1453.76

## 2023-10-27 ENCOUNTER — HOSPITAL ENCOUNTER (OUTPATIENT)
Dept: PHYSICAL THERAPY | Facility: CLINIC | Age: 78
Setting detail: THERAPIES SERIES
Discharge: HOME | End: 2023-10-27
Payer: MEDICARE

## 2023-10-27 PROCEDURE — 97113 AQUATIC THERAPY/EXERCISES: CPT

## 2023-10-27 NOTE — FLOWSHEET NOTE
No  [] Reviewed Prior HEP/Ed  Method of Education: [x] Verbal  [] Demo  [] Written  Comprehension of Education:  [x] Verbalizes understanding. [] Demonstrates understanding. [] Needs review. [] Demonstrates/verbalizes HEP/Ed previously given. Plan: [x] Continue per plan of care. [x] Other:Plan to progress 1 day pool, 1 day land to wean from aquatics.        Time In: 1:15 pm         Time Out: 2:10 pm    Electronically signed by:  Alicia Pastor PTA

## 2023-10-27 NOTE — PRE-CERTIFICATION NOTE
Medicare Cap   [x] Physical Therapy  [] Speech Therapy  [] Occupational therapy  *PT and Speech caps combine      $2230 Limit for PT and Speech combined  $2230 Limit for OT individually  At the beginning of the month where you expect to go over $2230, please add the 1111 [facility] modifier      Patient Name: Tomy Lombard: 1945    Note:  This is an estimate of charges billed.      Date of 705 [facility] Name # units/ charge $$$ charge Daily Total Charge Ongoing Total $$$   6/14 Low eval, TE 2 97.02+22.29 119.31 119.31   6/19/23 3 aqua  31.20+23.12+23.12 77.44 196.75   6/21/23 3aqua  31.20+21.12+23.12 77.44 274.19   6/26 3 aqua   77.44 351.63   6/28 3 aqua   77.44 429.07   7/5 3 aqua   77.44 506.51   8/3 2 TA  35.10+25.06 60.16 566.67   8/9 3 aqua PTA 2023  30.74+22.57+22.57 75.88 642.55   8/16   24.77+19.15+9.29 53.21 695.76   8/23/23 TE, US 2,1 PTA 24.77+19.15+9.29 53.21 748.97   9/14 TA 2 35.10+25.06 60.16 809.13   9/20/23 3 aqua   75.88 885.01   9/22 3 aqua   75.88 960.89   9/28 3 aqua   75.88 1036.77   10/4 3 aqua   75.88 1112.65   10/11 3 aqua   75.88 1188.53   10/16 3 aqua   75.88 1264.41   10/20 2 aqua    53.31 1317.72   10/20 2 TA   60.16 1377.88   10/25/23 3 aqua   75.88 1453.76   10/27/23 3 aqua   75.88 1529.64

## 2023-10-30 DIAGNOSIS — M79.7 FIBROMYALGIA: Primary | ICD-10-CM

## 2023-10-30 RX ORDER — LEVOTHYROXINE SODIUM 0.07 MG/1
TABLET ORAL
Qty: 90 TABLET | Refills: 3 | Status: SHIPPED | OUTPATIENT
Start: 2023-10-30

## 2023-10-30 NOTE — TELEPHONE ENCOUNTER
Augusto Nam is calling to request a refill on the following medication(s):    Medication Request:  Requested Prescriptions     Pending Prescriptions Disp Refills    levothyroxine (SYNTHROID) 75 MCG tablet [Pharmacy Med Name: LEVOTHYROXINE 75 MCG TABLET] 90 tablet      Sig: TAKE ONE TABLET BY MOUTH EVERY MORNING ON AN EMPTY STOMACH       Last Visit Date (If Applicable):  55/84/1509    Next Visit Date:    4/22/2024

## 2023-10-31 ENCOUNTER — TELEPHONE (OUTPATIENT)
Age: 78
End: 2023-10-31

## 2023-10-31 RX ORDER — TRAMADOL HYDROCHLORIDE 50 MG/1
50 TABLET ORAL EVERY 8 HOURS PRN
Qty: 90 TABLET | Refills: 0 | Status: SHIPPED | OUTPATIENT
Start: 2023-10-31 | End: 2023-11-30

## 2023-10-31 RX ORDER — PANTOPRAZOLE SODIUM 40 MG/1
40 TABLET, DELAYED RELEASE ORAL DAILY
Qty: 30 TABLET | Refills: 5 | Status: SHIPPED | OUTPATIENT
Start: 2023-10-31

## 2023-10-31 NOTE — TELEPHONE ENCOUNTER
Called Dr. Kristie Liao office. She is out of the office for at least 2 weeks due to and accident. She has to look over the referral before they will schedule appointment. They said they would call her to make appointment. Also called [facility] to tell her that Betty office would be reaching out in the next couple weeks.

## 2023-10-31 NOTE — TELEPHONE ENCOUNTER
Jason Sol is calling to request a refill on the following medication(s):    Medication Request:  Requested Prescriptions     Pending Prescriptions Disp Refills    traMADol (ULTRAM) 50 MG tablet [Pharmacy Med Name: traMADol HCL 50MG TABLET] 21 tablet      Sig: TAKE ONE TABLET BY MOUTH EVERY 8 HOURS AS NEEDED FOR PAIN.  TO LAST 7 DAYS    pantoprazole (PROTONIX) 40 MG tablet [Pharmacy Med Name: PANTOPRAZOLE SOD DR 40 MG TAB] 30 tablet      Sig: TAKE ONE TABLET BY MOUTH DAILY       Last Visit Date (If Applicable):  66/11/6309    Next Visit Date:    4/22/2024

## 2023-10-31 NOTE — TELEPHONE ENCOUNTER
Patient wanted a note sent back to you letting you know that she is having a hard time scheduling with Dr Andrew Gilliam, patient states that she cant get past the girl who answers the phone and that they need to know to much.

## 2023-11-01 DIAGNOSIS — R79.89 CREATININE ELEVATION: Primary | ICD-10-CM

## 2023-11-03 ENCOUNTER — HOSPITAL ENCOUNTER (OUTPATIENT)
Dept: PHYSICAL THERAPY | Facility: CLINIC | Age: 78
Setting detail: THERAPIES SERIES
Discharge: HOME | End: 2023-11-03
Payer: MEDICARE

## 2023-11-03 PROCEDURE — 97110 THERAPEUTIC EXERCISES: CPT

## 2023-11-03 NOTE — FLOWSHEET NOTE
[] [address]  [address] [facility].  [phone]  F: [phone] [] [address] [facility]  [address]   [address]  P: [phone]  F: [phone] [x] [address]  [address] [facility]  P: [phone]  F: [phone] [] [place]: [phone]  F: [phone] [] [address]  [address] [facility]   [address]   P: [phone]  F: [phone]  [] [address] [facility].   P: [phone]  F: [phone] [] [address] [facility]  [address]  P: [phone]  F: [phone] [] [address] [facility]  [address] [facility]  [place]: [phone]  F: [phone] [] [address] [facility] [address]  [place]: [phone]  F: [phone]      Physical Therapy Daily Treatment Note    Date:  11/3/2023  Patient Name:  Indira Goodwin    :  1945  MRN: [id number]  Physician:      Insurance: 1. Medicare: no auth req'd; vs bomn - lurdes yr; ded met; coins 20%  2. [facility] AAR medicare supp; follows medicare guidelines, covers part b coins after ded. No pt estimate.   Medical Diagnosis:   M79.7 (ICD-10-CM) - Fibromyalgia   M16.9 (ICD-10-CM) - Osteoarthritis of hip, unspecified     Rehab Codes: M25.552, R26.81, M62.81, Z91.81   Onset Date: 2020    Next Dr. Cary Samson: PRN  Visit# / total visits: ; Progress note for Medicare patient due at visit 10     Cancels/No Shows: 4/3    Subjective:    Pain:  [x] Yes  [] No Location: L hip, buttock, LB  Pain Rating: (0-10 scale) 5/10  Pain altered Tx:  [] No

## 2023-11-03 NOTE — PRE-CERTIFICATION NOTE
Medicare Cap   [x] Physical Therapy  [] Speech Therapy  [] Occupational therapy  *PT and Speech caps combine      $2230 Limit for PT and Speech combined  $2230 Limit for OT individually  At the beginning of the month where you expect to go over $2230, please add the 1111 [facility] modifier      Patient Name: Mookie Zhou: 1945    Note:  This is an estimate of charges billed.      Date of 705 [facility] Name # units/ charge $$$ charge Daily Total Charge Ongoing Total $$$   6/14 Low eval, TE 2 97.02+22.29 119.31 119.31   6/19/23 3 aqua  31.20+23.12+23.12 77.44 196.75   6/21/23 3aqua  31.20+21.12+23.12 77.44 274.19   6/26 3 aqua   77.44 351.63   6/28 3 aqua   77.44 429.07   7/5 3 aqua   77.44 506.51   8/3 2 TA  35.10+25.06 60.16 566.67   8/9 3 aqua PTA 2023  30.74+22.57+22.57 75.88 642.55   8/16   24.77+19.15+9.29 53.21 695.76   8/23/23 TE, US 2,1 PTA 24.77+19.15+9.29 53.21 748.97   9/14 TA 2 35.10+25.06 60.16 809.13   9/20/23 3 aqua   75.88 885.01   9/22 3 aqua   75.88 960.89   9/28 3 aqua   75.88 1036.77   10/4 3 aqua   75.88 1112.65   10/11 3 aqua   75.88 1188.53   10/16 3 aqua   75.88 1264.41   10/20 2 aqua    53.31 1317.72   10/20 2 TA   60.16 1377.88   10/25/23 3 aqua   75.88 1453.76   10/27/23 3 aqua   75.88 1529.64   11/3 3 ex  24.77+19.15+19.15 63.07 1592.71

## 2023-11-07 DIAGNOSIS — R53.83 OTHER FATIGUE: Primary | ICD-10-CM

## 2023-11-08 ENCOUNTER — HOSPITAL ENCOUNTER (OUTPATIENT)
Dept: PHYSICAL THERAPY | Facility: CLINIC | Age: 78
Setting detail: THERAPIES SERIES
Discharge: HOME | End: 2023-11-08
Payer: MEDICARE

## 2023-11-08 PROCEDURE — 97113 AQUATIC THERAPY/EXERCISES: CPT

## 2023-11-08 NOTE — FLOWSHEET NOTE
[x] [facility]  [address] [facility]  P: [phone]  F: [phone]     Physical Therapy Daily  Aquatic Treatment Note    Date:  2023  Patient Name:  Pritesh Blanco    :  1945  MRN: [id number]  Physician: Viann Najjar, MD                              Insurance: 1. Medicare: no auth req'd; vs bomn - lurdes yr; ded met; coins 20%  2. [facility] AARP medicare supp; follows medicare guidelines, covers part b coins after ded. No pt estimate. Medical Diagnosis:     Diagnosis   M79.7 (ICD-10-CM) - Fibromyalgia   M16.9 (ICD-10-CM) - Osteoarthritis of hip, unspecified                 Rehab Codes:  M25.552, R26.81, M62.81, Z91.81  Onset date: 2020             Next 's appt. :     Visit# / total visits:                     Cancels/No Shows: 4/3    Subjective:    Pain:  [x] Yes  [] No Location: L hip /LB Pain Rating: (0-10 scale) 2-3/10  Pain altered Tx:  [x] No  [] Yes  Action:  Comments: Pt reports pain L hip if she turns wrong. Pt also feels more off balance today but feels her legs are stronger. Pt felt good after last weeks land session with no increased pain or soreness. Objective: Fall risk-1:1 into the pool for assist with amb.      KEY  B = Belt G = Gloves N = Noodle   C = Cuffs K = Kickboard P = Paddles   CC = Cervical Collar L = Laps T = Theratube   DB = Dumbells M = Minutes W = Weights     Exercises/Activities  Warm-up/Amb  Dynamic Exercises    Forward 3L N March 3L   Sideways 3L N Squat    Backwards 3L N Retro HS curls      Retro SLR    Stretches  Braiding    Gastroc/Soleus  Heel to Toe amb    Hamstring  Toe amb    Hip flexor  Heel amb    Piriformis      SKTC      Pec Stretch      Post Deltoid  Static Exercises UE Back against rail     Shoulder flex/ext 20   Static Exercises LE  Shoulder abd/add 20   Heel/toe raises 20 Shoulder H.  abd/add 20   March 20 Shoulder IR/ER    Mini-squats 20 Rowing 20   4-way hip  20 Arm Circles

## 2023-11-08 NOTE — PRE-CERTIFICATION NOTE
Medicare Cap   [x] Physical Therapy  [] Speech Therapy  [] Occupational therapy  *PT and Speech caps combine      $2230 Limit for PT and Speech combined  $2230 Limit for OT individually  At the beginning of the month where you expect to go over $2230, please add the [facility] modifier      Patient Name: Terence Lemusws: 1945    Note:  This is an estimate of charges billed.      Date of 705 [facility] Name # units/ charge $$$ charge Daily Total Charge Ongoing Total $$$   6/14 Low eval, TE 2 97.02+22.29 119.31 119.31   6/19/23 3 aqua  31.20+23.12+23.12 77.44 196.75   6/21/23 3aqua  31.20+21.12+23.12 77.44 274.19   6/26 3 aqua   77.44 351.63   6/28 3 aqua   77.44 429.07   7/5 3 aqua   77.44 506.51   8/3 2 TA  35.10+25.06 60.16 566.67   8/9 3 aqua PTA 2023  30.74+22.57+22.57 75.88 642.55   8/16   24.77+19.15+9.29 53.21 695.76   8/23/23 TE, US 2,1 PTA 24.77+19.15+9.29 53.21 748.97   9/14 TA 2 35.10+25.06 60.16 809.13   9/20/23 3 aqua   75.88 885.01   9/22 3 aqua   75.88 960.89   9/28 3 aqua   75.88 1036.77   10/4 3 aqua   75.88 1112.65   10/11 3 aqua   75.88 1188.53   10/16 3 aqua   75.88 1264.41   10/20 2 aqua    53.31 1317.72   10/20 2 TA   60.16 1377.88   10/25/23 3 aqua   75.88 1453.76   10/27/23 3 aqua   75.88 1529.64   11/3 3 ex  24.77+19.15+19.15 63.07 1592.71   11/8 3 aqua   75.88 1668.59

## 2023-11-10 ENCOUNTER — HOSPITAL ENCOUNTER (OUTPATIENT)
Dept: PHYSICAL THERAPY | Facility: CLINIC | Age: 78
Setting detail: THERAPIES SERIES
Discharge: HOME | End: 2023-11-10
Payer: MEDICARE

## 2023-11-10 NOTE — FLOWSHEET NOTE
[] [address]  [address] [facility].    [phone]  F: [phone]   [] 204 [facility]  [address]   [address]  P: [phone]  F: [phone]  [x] [address]  [address] [facility]  P: [phone]  F: [phone] [] Hansel Niceie  P: [phone]  F: [phone]  [] 224 [facility]  [address]   [place]: [phone]  F: [phone]   [] 97 [facility]  [address]  [place]: [phone]   F: [phone]     Physical Therapy Cancel/No Show note    Date: 11/10/2023  Patient:  Cee Larios  : 1945  MRN: [id number]    Cancels/No Shows to date:     For today's appointment patient:5/3    [x]  Cancelled    [] Rescheduled appointment    [] No-show     Reason given by patient:    [x]  Patient ill    []  Conflicting appointment    [] No transportation      [] Conflict with work    [] No reason given    [] Weather related    [] COVID-19    [] Other:      Comments:        [] Next appointment was confirmed    Electronically signed by: Roni Laughlin

## 2023-11-15 ENCOUNTER — HOSPITAL ENCOUNTER (OUTPATIENT)
Dept: PHYSICAL THERAPY | Facility: CLINIC | Age: 78
Setting detail: THERAPIES SERIES
Discharge: HOME | End: 2023-11-15
Payer: MEDICARE

## 2023-11-15 NOTE — FLOWSHEET NOTE
[] [address]  46[address] [facility].    [phone]  F: [phone]   [] [address]  [address]  P: [phone]  F: [phone]  [x] [address]  [address]  P: [phone]  F: [phone] [] [facility]  P: [phone]  F: [phone]  [] 224 [facility]  [address]   [place]: [phone]  F: [phone]   [] 97 [facility]  [address]  [place]: [phone]   F: [phone]     Physical Therapy Cancel/No Show note    Date: 11/15/2023  Patient: Cee Larios  : 1945  MRN: [id number]    Cancels/No Shows to date: 6/3    For today's appointment patient:    [x]  Cancelled    [] Rescheduled appointment    [] No-show     Reason given by patient:    [x]  Patient ill    []  Conflicting appointment    [] No transportation      [] Conflict with work    [] No reason given    [] Weather related    [] COVID-19    [x] Other:      Comments:  Pt with increased pain.       [] Next appointment was confirmed    Electronically signed by: Laurel Hamm PTA

## 2023-11-17 ENCOUNTER — HOSPITAL ENCOUNTER (OUTPATIENT)
Dept: PHYSICAL THERAPY | Facility: CLINIC | Age: 78
Setting detail: THERAPIES SERIES
Discharge: HOME | End: 2023-11-17
Payer: MEDICARE

## 2023-11-17 NOTE — FLOWSHEET NOTE
[] [address]  46[address] [facility].    [phone]  F: [phone]   [] [address]  [address]  P: [phone]  F: [phone]  [x] [address]  [address] [facility]  P: [phone]  F: [phone] [] Hansel Filomena  P: [phone]  F: [phone]  [] 224 [facility]   [address]   [place]: [phone]  F: [phone]   [] 97 [facility]  [address] [facility] [address]  [place]: [phone]   F: [phone]     Physical Therapy Cancel/No Show note    Date: 2023  Patient:  Jennifer Fail  : 1945  MRN: [id number]    Cancels/No Shows to date: 7/3    For today's appointment patient:    [x]  Cancelled    [x] Rescheduled appointment    [] No-show     Reason given by patient:    []  Patient ill    []  Conflicting appointment    [x] No transportation      [] Conflict with work    [] No reason given    [] Weather related    [] [id number]    [] Other:      Comments:        [x] Next appointment was confirmed    Electronically signed by: Marisol Eddy

## 2023-11-22 ENCOUNTER — HOSPITAL ENCOUNTER (OUTPATIENT)
Dept: PHYSICAL THERAPY | Facility: CLINIC | Age: 78
Setting detail: THERAPIES SERIES
Discharge: HOME | End: 2023-11-22
Payer: MEDICARE

## 2023-11-22 PROCEDURE — 97113 AQUATIC THERAPY/EXERCISES: CPT

## 2023-11-22 NOTE — FLOWSHEET NOTE
[x] [facility] Outpatient Rehabilitation & Therapy  [address] [facility]  P: [phone]  F: [phone]     Physical Therapy Daily  Aquatic Treatment Note    Date:  2023  Patient Name:  Katie Rodriguez    :  1945  MRN: [id number]  Physician: Elana Goodrich MD                              Insurance: 1. Medicare: no auth req'd; vs bomn - lurdes yr; ded met; coins 20%  2. [facility] AARP medicare supp; follows medicare guidelines, covers part b coins after ded. No pt estimate. Medical Diagnosis:     Diagnosis   M79.7 (ICD-10-CM) - Fibromyalgia   M16.9 (ICD-10-CM) - Osteoarthritis of hip, unspecified                 Rehab Codes:  M25.552, R26.81, M62.81, Z91.81  Onset date: 2020             Next 's appt. :     Visit# / total visits:                     Cancels/No Shows: 4/3    Subjective:    Pain:  [x] Yes  [] No Location: L hip /LB Pain Rating: (0-10 scale) 5/10  Pain altered Tx:  [x] No  [] Yes  Action:  Comments: Pt reports pain L hip if she turns wrong. Pt missed last week due to increased pain due to stopping her pain meds. Pt notes side effects of pain meds caused kidney and bladder issues. Cont pain L hip and SIJ today. Objective: Fall risk-1:1 into the pool for assist with amb.      KEY  B = Belt G = Gloves N = Noodle   C = Cuffs K = Kickboard P = Paddles   CC = Cervical Collar L = Laps T = Theratube   DB = Dumbells M = Minutes W = Weights     Exercises/Activities  Warm-up/Amb  Dynamic Exercises    Forward 3L N March 3L   Sideways 3L N Squat    Backwards 2L N Retro HS curls      Retro SLR    Stretches  Braiding    Gastroc/Soleus  Heel to Toe amb    Hamstring  Toe amb    Hip flexor  Heel amb    Piriformis      SKTC      Pec Stretch      Post Deltoid  Static Exercises UE Back against rail     Shoulder flex/ext 20   Static Exercises LE  Shoulder abd/add 20   Heel/toe raises 15 Shoulder H.  abd/add 20    15 Shoulder IR/ER    Mini-squats 15 Rowing 20

## 2023-11-22 NOTE — PRE-CERTIFICATION NOTE
Medicare Cap   [x] Physical Therapy  [] Speech Therapy  [] Occupational therapy  *PT and Speech caps combine      $2230 Limit for PT and Speech combined  $2230 Limit for OT individually  At the beginning of the month where you expect to go over $2230, please add the [facility] modifier      Patient Name: Lexii Hoyos: 1945    Note:  This is an estimate of charges billed.      Date of 705 [facility] Name # units/ charge $$$ charge Daily Total Charge Ongoing Total $$$   6/14 Low eval, TE 2 97.02+22.29 119.31 119.31   6/19/23 3 aqua  31.20+23.12+23.12 77.44 196.75   6/21/23 3aqua  31.20+21.12+23.12 77.44 274.19   6/26 3 aqua   77.44 351.63   6/28 3 aqua   77.44 429.07   7/5 3 aqua   77.44 506.51   8/3 2 TA  35.10+25.06 60.16 566.67   8/9 3 aqua PTA 2023  30.74+22.57+22.57 75.88 642.55   8/16   24.77+19.15+9.29 53.21 695.76   8/23/23 TE, US 2,1 PTA 24.77+19.15+9.29 53.21 748.97   9/14 TA 2 35.10+25.06 60.16 809.13   9/20/23 3 aqua   75.88 885.01   9/22 3 aqua   75.88 960.89   9/28 3 aqua   75.88 1036.77   10/4 3 aqua   75.88 1112.65   10/11 3 aqua   75.88 1188.53   10/16 3 aqua   75.88 1264.41   10/20 2 aqua    53.31 1317.72   10/20 2 TA   60.16 1377.88   10/25/23 3 aqua   75.88 1453.76   10/27/23 3 aqua   75.88 1529.64   11/3 3 ex  24.77+19.15+19.15 63.07 1592.71   11/8 3 aqua   75.88 1668.59   11/22 3 aqua   75.88 1744.47

## 2023-11-27 ENCOUNTER — HOSPITAL ENCOUNTER (OUTPATIENT)
Dept: PHYSICAL THERAPY | Facility: CLINIC | Age: 78
Setting detail: THERAPIES SERIES
Discharge: HOME | End: 2023-11-27
Payer: MEDICARE

## 2023-11-27 ENCOUNTER — TELEPHONE (OUTPATIENT)
Age: 78
End: 2023-11-27

## 2023-11-27 NOTE — FLOWSHEET NOTE
[] [address]  46[address] [facility].    [phone]  F: [phone]   [] [address]  [address]   [address]  P: [phone]  F: [phone]  [x] [address]  [address]  P: [phone]  F: [phone] [] [facility]  P: [phone]  F: [phone]  [] [address]   [address]   [place]: [phone]  F: [phone]   [] 97 [facility]  [address]  [place]: [phone]   F: [phone]     Physical Therapy Cancel/No Show note    Date: 2023  Patient:  Amando Levin  : 1945  MRN: [id number]    Cancels/No Shows to date: 8/3    For today's appointment patient:    [x]  Cancelled    [] Rescheduled appointment    [] No-show     Reason given by patient:    [x]  Patient ill    []  Conflicting appointment    [] No transportation      [] Conflict with work    [] No reason given    [] Weather related    [] [id number]    [] Other:      Comments:        [x] Next appointment was confirmed    Electronically signed by: Bari Shetty

## 2023-11-27 NOTE — TELEPHONE ENCOUNTER
Patient is in a lot of pain. She has fibromyalgia, arthritis and bad hip  Pain level is a 7    She usually has been taking  Tramadol and tylenol. Dr Live Santamaria advised her to stop taking as these are hurting her kidneys. She stopped taking a few days ago. Patient wondering if  CBD would help her and not mess with her kidneys? Or is there something that can be called in for her? She is hurting.     Brigida Jasso

## 2023-11-28 NOTE — TELEPHONE ENCOUNTER
Spoke to  patient  and  she  is   taking  Tylenol 500mg  one  to  two  daily and  Tramadol 50 mg  two  tabs  daily  she  is  having  a lot  of  pain  and   she  is  very  SOB having  a lot  of   hip  pain Bcc Adenoid Histology Text: There were aggregates of basaloid cells demonstrating an adenoid or cribiform pattern.

## 2023-12-11 ENCOUNTER — HOSPITAL ENCOUNTER (OUTPATIENT)
Dept: PHYSICAL THERAPY | Facility: CLINIC | Age: 78
Setting detail: THERAPIES SERIES
Discharge: HOME | End: 2023-12-11
Payer: MEDICARE

## 2023-12-11 PROCEDURE — 97113 AQUATIC THERAPY/EXERCISES: CPT

## 2023-12-11 NOTE — PRE-CERTIFICATION NOTE
Medicare Cap   [x] Physical Therapy  [] Speech Therapy  [] Occupational therapy  *PT and Speech caps combine      $2230 Limit for PT and Speech combined  $2230 Limit for OT individually  At the beginning of the month where you expect to go over $2230, please add the [facility] modifier      Patient Name: Kelvin Hernandez: 1945    Note:  This is an estimate of charges billed. Date of 705 [facility] Name # units/ charge $$$ charge Daily Total Charge Ongoing Total $$$   6/14 Low eval, TE 2 97.02+22.29 119.31 119.31   6/19/23 3 aqua  31.20+23.12+23.12 77.44 196.75   6/21/23 3aqua  31.20+21.12+23.12 77.44 274.19   6/26 3 aqua   77.44 351.63   6/28 3 aqua   77.44 429.07   7/5 3 aqua   77.44 506.51   8/3 2 TA  35.10+25.06 60.16 566.67   8/9 3 aqua PTA 2023  30.74+22.57+22.57 75.88 642.55   8/16   24.77+19.15+9.29 53.21 695.76   8/23/23 TE, US 2,1 PTA 24.77+19.15+9.29 53.21 748.97   9/14 TA 2 35.10+25.06 60.16 809.13   9/20/23 3 aqua   75.88 885.01   9/22 3 aqua   75.88 960.89   9/28 3 aqua   75.88 1036.77   10/4 3 aqua   75.88 1112.65   10/11 3 aqua   75.88 1188.53   10/16 3 aqua   75.88 1264.41   10/20 2 aqua    53.31 1317.72   10/20 2 TA   60.16 1377.88   10/25/23 3 aqua   75.88 1453.76   10/27/23 3 aqua   75.88 1529.64   11/3 3 ex  24.77+19.15+19.15 63.07 1592.71   11/8 3 aqua   75.88 1668.59   11/22 3 aqua   75.88 1744.47   12/11 3 aqua   75.88 1820. 28

## 2023-12-11 NOTE — FLOWSHEET NOTE
falling   Patient will improve (B) tandem to 20 sec in order to improve balance and stability within (B) LE  Patient will report pain as 1/10                    Patient goals: \"Prevent falling\"    Pt. Education:  [x] Yes  [] No  [] Reviewed Prior HEP/Ed  Method of Education: [x] Verbal  [] Demo  [] Written  Comprehension of Education:  [x] Verbalizes understanding. [] Demonstrates understanding. [] Needs review. [] Demonstrates/verbalizes HEP/Ed previously given. Plan: [x] Continue per plan of care. [x] Other:Plan to progress 1 day pool, 1 day land to wean from aquatics.        Time In: 1:05pm         Time Out: 1:55 pm    Electronically signed by:  Elena Cage PTA

## 2023-12-13 ENCOUNTER — HOSPITAL ENCOUNTER (OUTPATIENT)
Dept: PHYSICAL THERAPY | Facility: CLINIC | Age: 78
Setting detail: THERAPIES SERIES
Discharge: HOME | End: 2023-12-13
Payer: MEDICARE

## 2023-12-13 NOTE — FLOWSHEET NOTE
[] [address]  [address] [facility].    [phone]  F: [phone]   [] 204 [facility]  [address]   [address]  P: [phone]  F: [phone]  [x] [address]  [address] [facility]  P: [phone]  F: [phone] [] [facility]  P: [phone]  F: [phone]  [] [address] [facility]   [address]   [place]: [phone]  F: [phone]   [] 97 [facility]  [address]  [place]: [phone]   F: [phone]     Physical Therapy Cancel/No Show note    Date: 2023  Patient:  Indira Goodwin  : 1945  MRN: [id number]    Cancels/No Shows to date: 9/3    For today's appointment patient:    [x]  Cancelled    [] Rescheduled appointment    [] No-show     Reason given by patient:    [x]  Patient ill    []  Conflicting appointment    [] No transportation      [] Conflict with work    [] No reason given    [] Weather related    [] [id number]    [] Other:      Comments:        [x] Next appointment was confirmed    Electronically signed by: Eryn Powell

## 2023-12-26 RX ORDER — AMOXICILLIN 500 MG/1
500 CAPSULE ORAL 2 TIMES DAILY
Qty: 20 CAPSULE | Refills: 0 | Status: SHIPPED | OUTPATIENT
Start: 2023-12-26 | End: 2024-01-05

## 2023-12-27 DIAGNOSIS — N39.0 URINARY TRACT INFECTION WITHOUT HEMATURIA, SITE UNSPECIFIED: Primary | ICD-10-CM

## 2023-12-27 NOTE — PROGRESS NOTES
Order for UA pending can you sign please! Patient is going to go to a [facility] facility to get it done after ATB!

## 2024-01-03 ENCOUNTER — HOSPITAL ENCOUNTER (OUTPATIENT)
Dept: PHYSICAL THERAPY | Facility: CLINIC | Age: 79
Setting detail: THERAPIES SERIES
Discharge: HOME | End: 2024-01-03

## 2024-01-03 NOTE — FLOWSHEET NOTE
[] [facility]  Outpatient Rehabilitation &  Therapy  [address]    [phone]  F: [phone]   [] [facility]  Outpatient Rehabilitation &  Therapy  [address]   [address]  P: [phone]  F: [phone]  [x] [facility]  Outpatient Rehabilitation &  Therapy  [address]  P: [phone]  F: [phone] [] [facility]  Outpatient Rehabilitation &  Therapy  [address]  P: [phone]  F: [phone]  [] [facility]  Outpatient Rehabilitation &  Therapy  [address]   P: [phone]  F: [phone]   [] [facility]   Outpatient Rehabilitation & Therapy  [address]  P: [phone]   F: [phone]     Physical Therapy Cancel/No Show note    Date: 1/3/2024  Patient: Monalisa Mcdowell  : 1945  MRN: [id number]    Cancels/No Shows to date: 10/4    For today's appointment patient:    [x]  Cancelled    [] Rescheduled appointment    [] No-show for re check and pool appt.     Reason given by patient:    [x]  Patient ill    []  Conflicting appointment    [] No transportation      [] Conflict with work    [] No reason given    [] Weather related    [] COVID-19    [] Other:      Comments:        [] Next appointment was confirmed    Electronically signed by: Sepideh Porter

## 2024-01-25 ENCOUNTER — HOSPITAL ENCOUNTER (OUTPATIENT)
Age: 79
Setting detail: SPECIMEN
Discharge: HOME | End: 2024-01-25

## 2024-01-25 LAB
BILIRUB UR QL STRIP: NEGATIVE
CLARITY UR: CLEAR
COLOR UR: YELLOW
COMMENT: NORMAL
GLUCOSE UR STRIP-MCNC: NEGATIVE MG/DL
HGB UR QL STRIP.AUTO: NEGATIVE
KETONES UR STRIP-MCNC: NEGATIVE MG/DL
LEUKOCYTE ESTERASE UR QL STRIP: NEGATIVE
NITRITE UR QL STRIP: NEGATIVE
PH UR STRIP: 6 [PH] (ref 5–8)
PROT UR STRIP-MCNC: NEGATIVE MG/DL
SP GR UR STRIP: 1.01 (ref 1–1.03)
UROBILINOGEN UR STRIP-ACNC: NORMAL EU/DL (ref 0–1)

## 2024-02-13 ENCOUNTER — CLINICAL DOCUMENTATION (OUTPATIENT)
Dept: PHYSICAL THERAPY | Facility: CLINIC | Age: 79
End: 2024-02-13

## 2024-02-13 NOTE — DISCHARGE SUMMARY
[] [facility]  Outpatient Rehabilitation &  Therapy  [address]  [phone]  [phone] [] [facility]  Outpatient Rehabilitation &  Therapy  [address]  P: [phone]  F: [phone] [x] [facility]  Outpatient Rehabilitation &  Therapy  [address]  P: [phone]  F: [phone] [] [facility]  Outpatient Rehabilitation &  Therapy  [address]  [phone]  [phone] [] [facility]  Outpatient Rehabilitation &  Therapy  [address]   P: [phone]  F: [phone]  [] [facility]  Outpatient Rehabilitation &  Therapy  [address]  P: [phone]  F: [phone] [] [facility]  Outpatient Rehabilitation &  Therapy  [address]  P: [phone]  F: [phone] [] [facility]  Outpatient Rehabilitation &  Therapy  [address]  P: [phone]  F: [phone] [] [facility]  Outpatient Rehabilitation &  Therapy  [address]  P: [phone]  F: [phone]  [] [facility] Outpatient Rehabilitation &  Therapy  [address]  P: [phone]  F: [phone]     Physical Therapy Discharge Note    Date: 2024      Patient: Monalisa Mcdowell  : 1945  MRN: [id number]    Physician: Duane Oneil MD                              Insurance: 1. Medicare: no auth req'd; vs bomn - lurdes yr; ded met; coins 20%  2. [facility] AARP medicare supp; follows medicare guidelines, covers part b coins after ded.  No pt estimate.  Medical Diagnosis:     Diagnosis   M79.7 (ICD-10-CM) - Fibromyalgia   M16.9 (ICD-10-CM) - Osteoarthritis of hip, unspecified                 Rehab Codes:  M25.552, R26.81, M62.81, Z91.81  Onset date: 2020             Next 's appt.:      Visit# / total visits:

## 2024-03-13 NOTE — TELEPHONE ENCOUNTER
Monalisa Mcdowell is calling to request a refill on the following medication(s):    Medication Request:  Requested Prescriptions     Pending Prescriptions Disp Refills    metoprolol tartrate (LOPRESSOR) 25 MG tablet [Pharmacy Med Name: METOPROLOL TARTRATE 25 MG TAB] 90 tablet      Sig: TAKE ONE TABLET BY MOUTH DAILY WITH FOOD       Last Visit Date (If Applicable):  10/18/2023    Next Visit Date:    4/22/2024

## 2024-04-03 ENCOUNTER — TELEPHONE (OUTPATIENT)
Age: 79
End: 2024-04-03

## 2024-04-03 NOTE — TELEPHONE ENCOUNTER
Spoke with Dr. SHAIKH, he said it is okay for patient to take keflex. L/M advising xena of this and told him to call with any questions

## 2024-04-03 NOTE — TELEPHONE ENCOUNTER
Ethan called back and now her sinuses, roof of mouth and teeth are all hurting. Ok to take antibiotics that she already?

## 2024-04-03 NOTE — TELEPHONE ENCOUNTER
Patients son called stating patient has a dry cough, sore throat on and off headache and head congestion since yesterday. Denies any fever. Patient has a script for a keflex she never used she is wondering if she can just take that or if something else can be sent in? Sveta in [place]   Patient does not want to come in for appt    Please advise Ethan--[phone]

## 2024-04-09 ENCOUNTER — OFFICE VISIT (OUTPATIENT)
Age: 79
End: 2024-04-09
Payer: MEDICARE

## 2024-04-09 VITALS
WEIGHT: 130.6 LBS | BODY MASS INDEX: 21.73 KG/M2 | HEART RATE: 58 BPM | RESPIRATION RATE: 14 BRPM | TEMPERATURE: 98.1 F | DIASTOLIC BLOOD PRESSURE: 60 MMHG | OXYGEN SATURATION: 97 % | SYSTOLIC BLOOD PRESSURE: 100 MMHG

## 2024-04-09 DIAGNOSIS — Z92.89 HISTORY OF CREATION OF VENTRICULOPERITONEAL SHUNT: ICD-10-CM

## 2024-04-09 DIAGNOSIS — J40 BRONCHITIS: Primary | ICD-10-CM

## 2024-04-09 DIAGNOSIS — R05.9 COUGH IN ADULT: ICD-10-CM

## 2024-04-09 DIAGNOSIS — J01.00 ACUTE NON-RECURRENT MAXILLARY SINUSITIS: ICD-10-CM

## 2024-04-09 PROCEDURE — 1090F PRES/ABSN URINE INCON ASSESS: CPT | Performed by: FAMILY MEDICINE

## 2024-04-09 PROCEDURE — 99213 OFFICE O/P EST LOW 20 MIN: CPT | Performed by: FAMILY MEDICINE

## 2024-04-09 PROCEDURE — G8427 DOCREV CUR MEDS BY ELIG CLIN: HCPCS | Performed by: FAMILY MEDICINE

## 2024-04-09 PROCEDURE — G8400 PT W/DXA NO RESULTS DOC: HCPCS | Performed by: FAMILY MEDICINE

## 2024-04-09 PROCEDURE — 3074F SYST BP LT 130 MM HG: CPT | Performed by: FAMILY MEDICINE

## 2024-04-09 PROCEDURE — 3078F DIAST BP <80 MM HG: CPT | Performed by: FAMILY MEDICINE

## 2024-04-09 PROCEDURE — 96372 THER/PROPH/DIAG INJ SC/IM: CPT | Performed by: FAMILY MEDICINE

## 2024-04-09 PROCEDURE — 1036F TOBACCO NON-USER: CPT | Performed by: FAMILY MEDICINE

## 2024-04-09 PROCEDURE — G8420 CALC BMI NORM PARAMETERS: HCPCS | Performed by: FAMILY MEDICINE

## 2024-04-09 PROCEDURE — 1123F ACP DISCUSS/DSCN MKR DOCD: CPT | Performed by: FAMILY MEDICINE

## 2024-04-09 RX ORDER — PREDNISONE 20 MG/1
20 TABLET ORAL 2 TIMES DAILY
Qty: 10 TABLET | Refills: 0 | Status: SHIPPED | OUTPATIENT
Start: 2024-04-09 | End: 2024-04-09

## 2024-04-09 RX ORDER — PREDNISONE 20 MG/1
20 TABLET ORAL DAILY
Qty: 5 TABLET | Refills: 0 | Status: SHIPPED | OUTPATIENT
Start: 2024-04-09 | End: 2024-04-14

## 2024-04-09 RX ORDER — METHYLPREDNISOLONE ACETATE 40 MG/ML
40 INJECTION, SUSPENSION INTRA-ARTICULAR; INTRALESIONAL; INTRAMUSCULAR; SOFT TISSUE ONCE
Status: COMPLETED | OUTPATIENT
Start: 2024-04-09 | End: 2024-04-09

## 2024-04-09 RX ORDER — BENZONATATE 100 MG/1
100-200 CAPSULE ORAL 3 TIMES DAILY PRN
Qty: 60 CAPSULE | Refills: 0 | Status: SHIPPED | OUTPATIENT
Start: 2024-04-09 | End: 2024-04-16

## 2024-04-09 RX ORDER — OMEPRAZOLE 20 MG/1
20 CAPSULE, DELAYED RELEASE ORAL DAILY
COMMUNITY

## 2024-04-09 RX ORDER — CLINDAMYCIN HYDROCHLORIDE 300 MG/1
300 CAPSULE ORAL 3 TIMES DAILY
Qty: 30 CAPSULE | Refills: 0 | Status: SHIPPED | OUTPATIENT
Start: 2024-04-09 | End: 2024-04-19

## 2024-04-09 RX ADMIN — METHYLPREDNISOLONE ACETATE 40 MG: 40 INJECTION, SUSPENSION INTRA-ARTICULAR; INTRALESIONAL; INTRAMUSCULAR; SOFT TISSUE at 12:13

## 2024-04-09 ASSESSMENT — PATIENT HEALTH QUESTIONNAIRE - PHQ9
SUM OF ALL RESPONSES TO PHQ QUESTIONS 1-9: 0
1. LITTLE INTEREST OR PLEASURE IN DOING THINGS: NOT AT ALL
SUM OF ALL RESPONSES TO PHQ QUESTIONS 1-9: 0
2. FEELING DOWN, DEPRESSED OR HOPELESS: NOT AT ALL
SUM OF ALL RESPONSES TO PHQ9 QUESTIONS 1 & 2: 0
SUM OF ALL RESPONSES TO PHQ QUESTIONS 1-9: 0
SUM OF ALL RESPONSES TO PHQ QUESTIONS 1-9: 0

## 2024-04-09 NOTE — PROGRESS NOTES
[facility]     Date of Visit:  2024  Patient Name: Monalisa Mcdowell   Patient :  1945     CHIEF COMPLAINT/HPI:     Monalisa Mcdowell is a 79 y.o. female who presents today for an general visit to be evaluated for the following condition(s):  Chief Complaint   Patient presents with    Cough     Patient is  here  for  coughing  for  3 weeks  not  feeling  any better    On old Keflex.  Cough head congestion facial pressure no nausea no vomiting rare diarrhea    REVIEW OF SYSTEM      Review of Systems  Increased fatigue cough facial pressure is described above clear rhinorrhea appetite has been diminished    REVIEWED INFORMATION      Allergies   Allergen Reactions    Codeine     Codeine Itching    Corn Oil      Other reaction(s): Unknown    Formaldehyde Hives and Other (See Comments)     Other reaction(s): Unknown    Lactase-Lactobacillus      Other reaction(s): Unknown    Mobic [Meloxicam]     Morphine      Other reaction(s): confusion    Nitrofurantoin     Sulfa Antibiotics     Sulfa Antibiotics Itching       Current Outpatient Medications   Medication Sig Note Dispense Refill    omeprazole (PRILOSEC) 20 MG delayed release capsule Take 1 capsule by mouth daily       clindamycin (CLEOCIN) 300 MG capsule Take 1 capsule by mouth 3 times daily for 10 days  30 capsule 0    benzonatate (TESSALON) 100 MG capsule Take 1-2 capsules by mouth 3 times daily as needed for Cough  60 capsule 0    predniSONE (DELTASONE) 20 MG tablet Take 1 tablet by mouth daily for 5 days  5 tablet 0    metoprolol tartrate (LOPRESSOR) 25 MG tablet TAKE ONE TABLET BY MOUTH DAILY WITH FOOD  90 tablet 3    levothyroxine (SYNTHROID) 75 MCG tablet TAKE ONE TABLET BY MOUTH EVERY MORNING ON AN EMPTY STOMACH  90 tablet 3    bacitracin-polymyxin b (POLYSPORIN) 500-22411 UNIT/GM ointment Apply topically 2017: Received from: [facility] Received Sig: Apply 1 application topically 2 (two) times a day.      buPROPion (WELLBUTRIN

## 2024-04-09 NOTE — PROGRESS NOTES
After obtaining consent, and per orders of Dr. Craft, injection of Depo 40 given in Ventrogluteal Left  by CRICKET ZAZUETA MA. ABN was signed prior to injection, copy was given to the patient. Patient tolerated the injection well.

## 2024-04-22 ENCOUNTER — OFFICE VISIT (OUTPATIENT)
Age: 79
End: 2024-04-22

## 2024-04-22 VITALS
BODY MASS INDEX: 21.89 KG/M2 | WEIGHT: 131.4 LBS | TEMPERATURE: 97.7 F | SYSTOLIC BLOOD PRESSURE: 120 MMHG | HEIGHT: 65 IN | DIASTOLIC BLOOD PRESSURE: 76 MMHG | OXYGEN SATURATION: 98 % | HEART RATE: 72 BPM

## 2024-04-22 DIAGNOSIS — J40 BRONCHITIS: ICD-10-CM

## 2024-04-22 DIAGNOSIS — I10 ESSENTIAL HYPERTENSION: Primary | ICD-10-CM

## 2024-04-22 DIAGNOSIS — R05.9 COUGH IN ADULT: ICD-10-CM

## 2024-04-22 DIAGNOSIS — J01.00 ACUTE NON-RECURRENT MAXILLARY SINUSITIS: ICD-10-CM

## 2024-04-22 DIAGNOSIS — E78.2 MIXED HYPERLIPIDEMIA: ICD-10-CM

## 2024-04-22 DIAGNOSIS — I48.11 LONGSTANDING PERSISTENT ATRIAL FIBRILLATION (HCC): ICD-10-CM

## 2024-04-22 RX ORDER — CITALOPRAM HYDROBROMIDE 10 MG/1
30 TABLET ORAL DAILY
COMMUNITY
Start: 2021-05-15

## 2024-04-22 RX ORDER — AZITHROMYCIN 250 MG/1
TABLET, FILM COATED ORAL
Qty: 6 TABLET | Refills: 1 | Status: SHIPPED | OUTPATIENT
Start: 2024-04-22 | End: 2024-05-02

## 2024-04-22 RX ORDER — ALBUTEROL SULFATE 90 UG/1
AEROSOL, METERED RESPIRATORY (INHALATION) PRN
COMMUNITY

## 2024-04-22 RX ORDER — METHYLPREDNISOLONE ACETATE 40 MG/ML
40 INJECTION, SUSPENSION INTRA-ARTICULAR; INTRALESIONAL; INTRAMUSCULAR; SOFT TISSUE ONCE
Status: COMPLETED | OUTPATIENT
Start: 2024-04-22 | End: 2024-04-22

## 2024-04-22 RX ADMIN — METHYLPREDNISOLONE ACETATE 40 MG: 40 INJECTION, SUSPENSION INTRA-ARTICULAR; INTRALESIONAL; INTRAMUSCULAR; SOFT TISSUE at 14:40

## 2024-04-22 NOTE — PROGRESS NOTES
After obtaining consent, and per orders of Dr. Oneil, injection of Depo 40 mg given in Right Ventrogluteal by Henrietta Chatterjee. ABN was signed prior to injection, copy was given to the patient. Patient tolerated the injection well.  
and complete.

## 2024-04-29 RX ORDER — CLINDAMYCIN HYDROCHLORIDE 300 MG/1
CAPSULE ORAL
Qty: 30 CAPSULE | Refills: 0 | Status: SHIPPED | OUTPATIENT
Start: 2024-04-29

## 2024-04-29 NOTE — TELEPHONE ENCOUNTER
Monalisa Mcdowell is calling to request a refill on the following medication(s):    Medication Request:  Requested Prescriptions     Pending Prescriptions Disp Refills    clindamycin (CLEOCIN) 300 MG capsule [Pharmacy Med Name: CLINDAMYCIN  MG CAPSULE] 30 capsule 0     Sig: TAKE 1 CAPSULE BY MOUTH 3 TIMES A DAY FOR 10 DAYS       Last Visit Date (If Applicable):  4/9/2024    Next Visit Date:    Visit date not found

## 2024-06-17 RX ORDER — CITALOPRAM HYDROBROMIDE 10 MG/1
30 TABLET ORAL DAILY
Qty: 30 TABLET | Status: CANCELLED | OUTPATIENT
Start: 2024-06-17

## 2024-06-17 NOTE — TELEPHONE ENCOUNTER
Patient is calling states that she has been experiencing memory issues and is in a lot of pain right ow and has never felt like this before she would like a call back today after your finished with patient       Monalisa Mcdowell is calling to request a refill on the following medication(s):    Medication Request:  Requested Prescriptions     Pending Prescriptions Disp Refills    citalopram (CELEXA) 10 MG tablet 30 tablet      Sig: Take 3 tablets by mouth daily       Last Visit Date (If Applicable):  4/22/2024    Next Visit Date:    8/26/2024

## 2024-07-22 ENCOUNTER — TELEPHONE (OUTPATIENT)
Age: 79
End: 2024-07-22

## 2024-07-22 NOTE — TELEPHONE ENCOUNTER
Pts chiropractor recommended Dr wagner  for her hip surgery can pt have a referral to him  and to get your opinion on this dr please advise notify pt when referral is in she has appointment with him 7.67

## 2024-08-01 RX ORDER — PREDNISONE 20 MG/1
20 TABLET ORAL DAILY
Qty: 5 TABLET | Refills: 0 | Status: SHIPPED | OUTPATIENT
Start: 2024-08-01 | End: 2024-08-06

## 2024-08-01 NOTE — TELEPHONE ENCOUNTER
Monalisa Mcdowell is calling to request a refill on the following medication(s):    Medication Request:  Requested Prescriptions     Pending Prescriptions Disp Refills    predniSONE (DELTASONE) 20 MG tablet [Pharmacy Med Name: PREDNISONE 20 MG TABLET] 5 tablet 0     Sig: TAKE 1 TABLET BY MOUTH DAILY FOR 5 DAYS       Last Visit Date (If Applicable):  4/9/2024    Next Visit Date:    Visit date not found

## 2024-08-20 ENCOUNTER — HOSPITAL ENCOUNTER (INPATIENT)
Age: 79
LOS: 5 days | Discharge: HOME | DRG: 392 | End: 2024-08-25
Attending: STUDENT IN AN ORGANIZED HEALTH CARE EDUCATION/TRAINING PROGRAM | Admitting: STUDENT IN AN ORGANIZED HEALTH CARE EDUCATION/TRAINING PROGRAM
Payer: MEDICARE

## 2024-08-20 ENCOUNTER — APPOINTMENT (OUTPATIENT)
Dept: GENERAL RADIOLOGY | Age: 79
DRG: 392 | End: 2024-08-20
Payer: MEDICARE

## 2024-08-20 ENCOUNTER — APPOINTMENT (OUTPATIENT)
Dept: CT IMAGING | Age: 79
DRG: 392 | End: 2024-08-20
Payer: MEDICARE

## 2024-08-20 DIAGNOSIS — R79.89 ELEVATED SERUM CREATININE: ICD-10-CM

## 2024-08-20 DIAGNOSIS — K31.1 GASTRIC OUTLET OBSTRUCTION: Primary | ICD-10-CM

## 2024-08-20 DIAGNOSIS — R79.89 ELEVATED LACTIC ACID LEVEL: ICD-10-CM

## 2024-08-20 DIAGNOSIS — F41.9 ANXIETY: ICD-10-CM

## 2024-08-20 PROBLEM — E87.20 LACTIC ACIDOSIS: Status: ACTIVE | Noted: 2024-08-20

## 2024-08-20 PROBLEM — K31.89: Status: ACTIVE | Noted: 2024-08-20

## 2024-08-20 PROBLEM — D72.829 LEUKOCYTOSIS: Status: ACTIVE | Noted: 2024-08-20

## 2024-08-20 PROBLEM — I10 HYPERTENSION, ESSENTIAL: Status: ACTIVE | Noted: 2024-08-20

## 2024-08-20 LAB
ABO + RH BLD: NORMAL
ALBUMIN SERPL-MCNC: 4.6 G/DL (ref 3.4–5)
ALBUMIN/GLOB SERPL: 1.3 {RATIO} (ref 1.1–2.2)
ALP SERPL-CCNC: 80 U/L (ref 40–129)
ALT SERPL-CCNC: 13 U/L (ref 10–40)
ANION GAP SERPL CALCULATED.3IONS-SCNC: 22 MMOL/L (ref 3–16)
AST SERPL-CCNC: 19 U/L (ref 15–37)
BASE EXCESS BLDV CALC-SCNC: 3.9 MMOL/L (ref -3–3)
BASOPHILS # BLD: 0 K/UL (ref 0–0.2)
BASOPHILS NFR BLD: 0.2 %
BILIRUB SERPL-MCNC: 0.3 MG/DL (ref 0–1)
BLD GP AB SCN SERPL QL: NORMAL
BUN SERPL-MCNC: 31 MG/DL (ref 7–20)
CALCIUM SERPL-MCNC: 10.4 MG/DL (ref 8.3–10.6)
CHLORIDE SERPL-SCNC: 95 MMOL/L (ref 99–110)
CO2 BLDV-SCNC: 28 MMOL/L
CO2 SERPL-SCNC: 26 MMOL/L (ref 21–32)
COHGB MFR BLDV: 1.4 % (ref 0–1.5)
CREAT SERPL-MCNC: 1.8 MG/DL (ref 0.6–1.2)
DEPRECATED RDW RBC AUTO: 12.9 % (ref 12.4–15.4)
EKG ATRIAL RATE: 96 BPM
EKG DIAGNOSIS: NORMAL
EKG P AXIS: 58 DEGREES
EKG P-R INTERVAL: 162 MS
EKG Q-T INTERVAL: 422 MS
EKG QRS DURATION: 96 MS
EKG QTC CALCULATION (BAZETT): 533 MS
EKG R AXIS: -61 DEGREES
EKG T AXIS: 61 DEGREES
EKG VENTRICULAR RATE: 96 BPM
EOSINOPHIL # BLD: 0 K/UL (ref 0–0.6)
EOSINOPHIL NFR BLD: 0 %
GFR SERPLBLD CREATININE-BSD FMLA CKD-EPI: 28 ML/MIN/{1.73_M2}
GLUCOSE BLD-MCNC: 126 MG/DL (ref 70–99)
GLUCOSE BLD-MCNC: 135 MG/DL (ref 70–99)
GLUCOSE BLD-MCNC: 139 MG/DL (ref 70–99)
GLUCOSE BLD-MCNC: 177 MG/DL (ref 70–99)
GLUCOSE SERPL-MCNC: 254 MG/DL (ref 70–99)
HCO3 BLDV-SCNC: 27.2 MMOL/L (ref 23–29)
HCT VFR BLD AUTO: 45.5 % (ref 36–48)
HEMOCCULT STL QL: NORMAL
HGB BLD-MCNC: 15.1 G/DL (ref 12–16)
LACTATE BLDV-SCNC: 1.2 MMOL/L (ref 0.4–2)
LACTATE BLDV-SCNC: 3.8 MMOL/L (ref 0.4–2)
LACTATE BLDV-SCNC: 4.6 MMOL/L (ref 0.4–2)
LACTATE BLDV-SCNC: 5.2 MMOL/L (ref 0.4–2)
LIPASE SERPL-CCNC: 129 U/L (ref 13–60)
LYMPHOCYTES # BLD: 0.6 K/UL (ref 1–5.1)
LYMPHOCYTES NFR BLD: 5.1 %
MCH RBC QN AUTO: 28.5 PG (ref 26–34)
MCHC RBC AUTO-ENTMCNC: 33.1 G/DL (ref 31–36)
MCV RBC AUTO: 86.1 FL (ref 80–100)
METHGB MFR BLDV: 0.3 %
MONOCYTES # BLD: 0.5 K/UL (ref 0–1.3)
MONOCYTES NFR BLD: 4.3 %
NEUTROPHILS # BLD: 11.4 K/UL (ref 1.7–7.7)
NEUTROPHILS NFR BLD: 90.4 %
NT-PROBNP SERPL-MCNC: 334 PG/ML (ref 0–449)
O2 CT VFR BLDV CALC: 18 VOL %
O2 THERAPY: ABNORMAL
PCO2 BLDV: 36.9 MMHG (ref 40–50)
PERFORMED ON: ABNORMAL
PH BLDV: 7.49 [PH] (ref 7.35–7.45)
PLATELET # BLD AUTO: 231 K/UL (ref 135–450)
PMV BLD AUTO: 8.2 FL (ref 5–10.5)
PO2 BLDV: 45.4 MMHG (ref 25–40)
POTASSIUM SERPL-SCNC: 3.6 MMOL/L (ref 3.5–5.1)
PROT SERPL-MCNC: 8.1 G/DL (ref 6.4–8.2)
RBC # BLD AUTO: 5.29 M/UL (ref 4–5.2)
SAO2 % BLDV: 85 %
SODIUM SERPL-SCNC: 143 MMOL/L (ref 136–145)
TROPONIN, HIGH SENSITIVITY: 29 NG/L (ref 0–14)
TROPONIN, HIGH SENSITIVITY: 30 NG/L (ref 0–14)
WBC # BLD AUTO: 12.6 K/UL (ref 4–11)

## 2024-08-20 PROCEDURE — 86901 BLOOD TYPING SEROLOGIC RH(D): CPT

## 2024-08-20 PROCEDURE — 0D9670Z DRAINAGE OF STOMACH WITH DRAINAGE DEVICE, VIA NATURAL OR ARTIFICIAL OPENING: ICD-10-PCS | Performed by: INTERNAL MEDICINE

## 2024-08-20 PROCEDURE — 6360000002 HC RX W HCPCS: Performed by: NURSE PRACTITIONER

## 2024-08-20 PROCEDURE — 96374 THER/PROPH/DIAG INJ IV PUSH: CPT

## 2024-08-20 PROCEDURE — 83605 ASSAY OF LACTIC ACID: CPT

## 2024-08-20 PROCEDURE — 74176 CT ABD & PELVIS W/O CONTRAST: CPT

## 2024-08-20 PROCEDURE — 2580000003 HC RX 258: Performed by: STUDENT IN AN ORGANIZED HEALTH CARE EDUCATION/TRAINING PROGRAM

## 2024-08-20 PROCEDURE — 6360000002 HC RX W HCPCS

## 2024-08-20 PROCEDURE — 99222 1ST HOSP IP/OBS MODERATE 55: CPT | Performed by: NURSE PRACTITIONER

## 2024-08-20 PROCEDURE — 86850 RBC ANTIBODY SCREEN: CPT

## 2024-08-20 PROCEDURE — 36415 COLL VENOUS BLD VENIPUNCTURE: CPT

## 2024-08-20 PROCEDURE — 85025 COMPLETE CBC W/AUTO DIFF WBC: CPT

## 2024-08-20 PROCEDURE — 83880 ASSAY OF NATRIURETIC PEPTIDE: CPT

## 2024-08-20 PROCEDURE — 2060000000 HC ICU INTERMEDIATE R&B

## 2024-08-20 PROCEDURE — 86900 BLOOD TYPING SEROLOGIC ABO: CPT

## 2024-08-20 PROCEDURE — 99223 1ST HOSP IP/OBS HIGH 75: CPT | Performed by: SURGERY

## 2024-08-20 PROCEDURE — APPSS30 APP SPLIT SHARED TIME 16-30 MINUTES

## 2024-08-20 PROCEDURE — 93010 ELECTROCARDIOGRAM REPORT: CPT | Performed by: INTERNAL MEDICINE

## 2024-08-20 PROCEDURE — 6360000002 HC RX W HCPCS: Performed by: STUDENT IN AN ORGANIZED HEALTH CARE EDUCATION/TRAINING PROGRAM

## 2024-08-20 PROCEDURE — 87040 BLOOD CULTURE FOR BACTERIA: CPT

## 2024-08-20 PROCEDURE — 2580000003 HC RX 258: Performed by: NURSE PRACTITIONER

## 2024-08-20 PROCEDURE — 84484 ASSAY OF TROPONIN QUANT: CPT

## 2024-08-20 PROCEDURE — 99285 EMERGENCY DEPT VISIT HI MDM: CPT

## 2024-08-20 PROCEDURE — 71045 X-RAY EXAM CHEST 1 VIEW: CPT

## 2024-08-20 PROCEDURE — 96375 TX/PRO/DX INJ NEW DRUG ADDON: CPT

## 2024-08-20 PROCEDURE — 83690 ASSAY OF LIPASE: CPT

## 2024-08-20 PROCEDURE — 80053 COMPREHEN METABOLIC PANEL: CPT

## 2024-08-20 PROCEDURE — 93005 ELECTROCARDIOGRAM TRACING: CPT | Performed by: STUDENT IN AN ORGANIZED HEALTH CARE EDUCATION/TRAINING PROGRAM

## 2024-08-20 PROCEDURE — 82270 OCCULT BLOOD FECES: CPT

## 2024-08-20 PROCEDURE — 82803 BLOOD GASES ANY COMBINATION: CPT

## 2024-08-20 PROCEDURE — 6370000000 HC RX 637 (ALT 250 FOR IP)

## 2024-08-20 PROCEDURE — 74018 RADEX ABDOMEN 1 VIEW: CPT

## 2024-08-20 RX ORDER — MAGNESIUM SULFATE IN WATER 40 MG/ML
2000 INJECTION, SOLUTION INTRAVENOUS PRN
Status: DISCONTINUED | OUTPATIENT
Start: 2024-08-20 | End: 2024-08-20

## 2024-08-20 RX ORDER — SODIUM CHLORIDE, SODIUM LACTATE, POTASSIUM CHLORIDE, AND CALCIUM CHLORIDE .6; .31; .03; .02 G/100ML; G/100ML; G/100ML; G/100ML
500 INJECTION, SOLUTION INTRAVENOUS ONCE
Status: COMPLETED | OUTPATIENT
Start: 2024-08-20 | End: 2024-08-20

## 2024-08-20 RX ORDER — HYDRALAZINE HYDROCHLORIDE 20 MG/ML
10 INJECTION INTRAMUSCULAR; INTRAVENOUS EVERY 6 HOURS PRN
Status: DISCONTINUED | OUTPATIENT
Start: 2024-08-20 | End: 2024-08-21

## 2024-08-20 RX ORDER — PROCHLORPERAZINE EDISYLATE 5 MG/ML
10 INJECTION INTRAMUSCULAR; INTRAVENOUS EVERY 6 HOURS PRN
Status: DISCONTINUED | OUTPATIENT
Start: 2024-08-20 | End: 2024-08-25 | Stop reason: HOSPADM

## 2024-08-20 RX ORDER — DIAZEPAM 5 MG
5 TABLET ORAL NIGHTLY PRN
Status: ON HOLD | COMMUNITY
End: 2024-08-25

## 2024-08-20 RX ORDER — FENTANYL CITRATE 50 UG/ML
25 INJECTION, SOLUTION INTRAMUSCULAR; INTRAVENOUS ONCE
Status: COMPLETED | OUTPATIENT
Start: 2024-08-20 | End: 2024-08-20

## 2024-08-20 RX ORDER — ENOXAPARIN SODIUM 100 MG/ML
30 INJECTION SUBCUTANEOUS DAILY
Status: DISCONTINUED | OUTPATIENT
Start: 2024-08-20 | End: 2024-08-23

## 2024-08-20 RX ORDER — PANTOPRAZOLE SODIUM 40 MG/1
40 TABLET, DELAYED RELEASE ORAL
COMMUNITY

## 2024-08-20 RX ORDER — ONDANSETRON 2 MG/ML
4 INJECTION INTRAMUSCULAR; INTRAVENOUS EVERY 6 HOURS PRN
Status: DISCONTINUED | OUTPATIENT
Start: 2024-08-20 | End: 2024-08-20 | Stop reason: SDUPTHER

## 2024-08-20 RX ORDER — SODIUM CHLORIDE, SODIUM LACTATE, POTASSIUM CHLORIDE, CALCIUM CHLORIDE 600; 310; 30; 20 MG/100ML; MG/100ML; MG/100ML; MG/100ML
INJECTION, SOLUTION INTRAVENOUS CONTINUOUS
Status: ACTIVE | OUTPATIENT
Start: 2024-08-20 | End: 2024-08-21

## 2024-08-20 RX ORDER — ONDANSETRON 4 MG/1
4 TABLET, ORALLY DISINTEGRATING ORAL EVERY 8 HOURS PRN
Status: DISCONTINUED | OUTPATIENT
Start: 2024-08-20 | End: 2024-08-20

## 2024-08-20 RX ORDER — ACETAMINOPHEN 650 MG/1
650 SUPPOSITORY RECTAL EVERY 6 HOURS PRN
Status: DISCONTINUED | OUTPATIENT
Start: 2024-08-20 | End: 2024-08-25 | Stop reason: HOSPADM

## 2024-08-20 RX ORDER — POTASSIUM CHLORIDE 1500 MG/1
40 TABLET, EXTENDED RELEASE ORAL PRN
Status: DISCONTINUED | OUTPATIENT
Start: 2024-08-20 | End: 2024-08-20

## 2024-08-20 RX ORDER — POTASSIUM CHLORIDE 7.45 MG/ML
10 INJECTION INTRAVENOUS PRN
Status: DISCONTINUED | OUTPATIENT
Start: 2024-08-20 | End: 2024-08-20

## 2024-08-20 RX ORDER — PROCHLORPERAZINE MALEATE 10 MG
10 TABLET ORAL EVERY 8 HOURS PRN
Status: DISCONTINUED | OUTPATIENT
Start: 2024-08-20 | End: 2024-08-25 | Stop reason: HOSPADM

## 2024-08-20 RX ORDER — LIFITEGRAST 50 MG/ML
SOLUTION/ DROPS OPHTHALMIC
COMMUNITY

## 2024-08-20 RX ORDER — ACETAMINOPHEN 325 MG/1
650 TABLET ORAL EVERY 6 HOURS PRN
Status: DISCONTINUED | OUTPATIENT
Start: 2024-08-20 | End: 2024-08-25 | Stop reason: HOSPADM

## 2024-08-20 RX ORDER — PANTOPRAZOLE SODIUM 40 MG/10ML
80 INJECTION, POWDER, LYOPHILIZED, FOR SOLUTION INTRAVENOUS ONCE
Status: COMPLETED | OUTPATIENT
Start: 2024-08-20 | End: 2024-08-20

## 2024-08-20 RX ORDER — SODIUM CHLORIDE 0.9 % (FLUSH) 0.9 %
5-40 SYRINGE (ML) INJECTION EVERY 12 HOURS SCHEDULED
Status: DISCONTINUED | OUTPATIENT
Start: 2024-08-20 | End: 2024-08-25 | Stop reason: HOSPADM

## 2024-08-20 RX ORDER — HYDRALAZINE HYDROCHLORIDE 20 MG/ML
5 INJECTION INTRAMUSCULAR; INTRAVENOUS ONCE
Status: COMPLETED | OUTPATIENT
Start: 2024-08-20 | End: 2024-08-20

## 2024-08-20 RX ORDER — HYDRALAZINE HYDROCHLORIDE 20 MG/ML
5 INJECTION INTRAMUSCULAR; INTRAVENOUS EVERY 6 HOURS PRN
Status: DISCONTINUED | OUTPATIENT
Start: 2024-08-20 | End: 2024-08-20

## 2024-08-20 RX ORDER — SODIUM CHLORIDE 9 MG/ML
INJECTION, SOLUTION INTRAVENOUS PRN
Status: DISCONTINUED | OUTPATIENT
Start: 2024-08-20 | End: 2024-08-25 | Stop reason: HOSPADM

## 2024-08-20 RX ORDER — SODIUM CHLORIDE 0.9 % (FLUSH) 0.9 %
5-40 SYRINGE (ML) INJECTION PRN
Status: DISCONTINUED | OUTPATIENT
Start: 2024-08-20 | End: 2024-08-25 | Stop reason: HOSPADM

## 2024-08-20 RX ORDER — SODIUM CHLORIDE, SODIUM LACTATE, POTASSIUM CHLORIDE, AND CALCIUM CHLORIDE .6; .31; .03; .02 G/100ML; G/100ML; G/100ML; G/100ML
1000 INJECTION, SOLUTION INTRAVENOUS ONCE
Status: COMPLETED | OUTPATIENT
Start: 2024-08-20 | End: 2024-08-20

## 2024-08-20 RX ORDER — POLYETHYLENE GLYCOL 3350 17 G/17G
17 POWDER, FOR SOLUTION ORAL DAILY PRN
Status: DISCONTINUED | OUTPATIENT
Start: 2024-08-20 | End: 2024-08-25 | Stop reason: HOSPADM

## 2024-08-20 RX ORDER — ONDANSETRON 2 MG/ML
4 INJECTION INTRAMUSCULAR; INTRAVENOUS EVERY 6 HOURS PRN
Status: DISCONTINUED | OUTPATIENT
Start: 2024-08-20 | End: 2024-08-20

## 2024-08-20 RX ADMIN — SODIUM CHLORIDE, PRESERVATIVE FREE 10 ML: 5 INJECTION INTRAVENOUS at 09:01

## 2024-08-20 RX ADMIN — PROCHLORPERAZINE EDISYLATE 10 MG: 5 INJECTION INTRAMUSCULAR; INTRAVENOUS at 08:20

## 2024-08-20 RX ADMIN — HYDRALAZINE HYDROCHLORIDE 10 MG: 20 INJECTION INTRAMUSCULAR; INTRAVENOUS at 10:10

## 2024-08-20 RX ADMIN — HYDROMORPHONE HYDROCHLORIDE 0.25 MG: 1 INJECTION, SOLUTION INTRAMUSCULAR; INTRAVENOUS; SUBCUTANEOUS at 21:50

## 2024-08-20 RX ADMIN — PHENOL 1 SPRAY: 1.5 LIQUID ORAL at 10:16

## 2024-08-20 RX ADMIN — SODIUM CHLORIDE, POTASSIUM CHLORIDE, SODIUM LACTATE AND CALCIUM CHLORIDE: 600; 310; 30; 20 INJECTION, SOLUTION INTRAVENOUS at 17:14

## 2024-08-20 RX ADMIN — PANTOPRAZOLE SODIUM 80 MG: 40 INJECTION, POWDER, FOR SOLUTION INTRAVENOUS at 05:44

## 2024-08-20 RX ADMIN — FENTANYL CITRATE 25 MCG: 50 INJECTION, SOLUTION INTRAMUSCULAR; INTRAVENOUS at 04:08

## 2024-08-20 RX ADMIN — HYDROMORPHONE HYDROCHLORIDE 0.25 MG: 1 INJECTION, SOLUTION INTRAMUSCULAR; INTRAVENOUS; SUBCUTANEOUS at 09:11

## 2024-08-20 RX ADMIN — SODIUM CHLORIDE, POTASSIUM CHLORIDE, SODIUM LACTATE AND CALCIUM CHLORIDE 1000 ML: 600; 310; 30; 20 INJECTION, SOLUTION INTRAVENOUS at 05:04

## 2024-08-20 RX ADMIN — ONDANSETRON 4 MG: 2 INJECTION INTRAMUSCULAR; INTRAVENOUS at 04:09

## 2024-08-20 RX ADMIN — SODIUM CHLORIDE, POTASSIUM CHLORIDE, SODIUM LACTATE AND CALCIUM CHLORIDE: 600; 310; 30; 20 INJECTION, SOLUTION INTRAVENOUS at 08:59

## 2024-08-20 RX ADMIN — HYDRALAZINE HYDROCHLORIDE 5 MG: 20 INJECTION INTRAMUSCULAR; INTRAVENOUS at 05:05

## 2024-08-20 RX ADMIN — PANTOPRAZOLE SODIUM 40 MG: 40 INJECTION, POWDER, FOR SOLUTION INTRAVENOUS at 09:08

## 2024-08-20 RX ADMIN — SODIUM CHLORIDE, POTASSIUM CHLORIDE, SODIUM LACTATE AND CALCIUM CHLORIDE 500 ML: 600; 310; 30; 20 INJECTION, SOLUTION INTRAVENOUS at 06:52

## 2024-08-20 RX ADMIN — FENTANYL CITRATE 25 MCG: 50 INJECTION, SOLUTION INTRAMUSCULAR; INTRAVENOUS at 08:20

## 2024-08-20 ASSESSMENT — PAIN SCALES - GENERAL
PAINLEVEL_OUTOF10: 7
PAINLEVEL_OUTOF10: 7
PAINLEVEL_OUTOF10: 8
PAINLEVEL_OUTOF10: 5
PAINLEVEL_OUTOF10: 8

## 2024-08-20 ASSESSMENT — PAIN DESCRIPTION - LOCATION
LOCATION: ABDOMEN
LOCATION: ABDOMEN
LOCATION: THROAT

## 2024-08-20 ASSESSMENT — PAIN DESCRIPTION - DESCRIPTORS
DESCRIPTORS: BURNING;PRESSURE
DESCRIPTORS: SORE
DESCRIPTORS: PRESSURE;BURNING
DESCRIPTORS: BURNING;DISCOMFORT

## 2024-08-20 ASSESSMENT — PAIN - FUNCTIONAL ASSESSMENT
PAIN_FUNCTIONAL_ASSESSMENT: ACTIVITIES ARE NOT PREVENTED
PAIN_FUNCTIONAL_ASSESSMENT: 0-10

## 2024-08-20 ASSESSMENT — PAIN DESCRIPTION - FREQUENCY: FREQUENCY: INTERMITTENT

## 2024-08-20 ASSESSMENT — PAIN DESCRIPTION - PAIN TYPE: TYPE: ACUTE PAIN

## 2024-08-20 ASSESSMENT — PAIN DESCRIPTION - ONSET: ONSET: ON-GOING

## 2024-08-20 ASSESSMENT — PAIN SCALES - WONG BAKER: WONGBAKER_NUMERICALRESPONSE: HURTS A LITTLE BIT

## 2024-08-20 NOTE — PLAN OF CARE
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    In the ED, initial vital signs showing blood pressure 219/104.  VBG showing pH of 7.49.  BMP showed creatinine of 1.8, BUN 31.  Anion gap of 22.  Glucose 254.  Lipase elevated at 129.  CBC showing white blood cell count of 12.6 and hemoglobin 15.1.    ED physician discussed with general surgery who requested hospitalist admission. NG tube was ordered and surgery will see in AM and patient will be admitted under hospitalist service.  Patient was given 1.5 L of fluid    Plan:  -General Surgery consult.  NG tube decompression  -Hold DVT prophylaxis until general surgery evaluation  -IV fluids with LR  -Trend lactic acid until improving    Dusty Scruggs MD

## 2024-08-20 NOTE — PROGRESS NOTES
Additive QT interval prolongation may occur with ondansetron. Arrhythmias occur most commonly when QTC >500.  Patient's QTC is 533.  Changed ondansetron to Compazine per policy.     Bryanna EscobedoD, [facility], 8/20/2024 6:49 AM

## 2024-08-20 NOTE — CONSULTS
General Surgery   Consult Note      Pt Name: Monalisa Mcdowell  MRN: [id number]  YOB: 1945  Primary Care Physician: Duane Oneil MD    Patient evaluated at the request of Dr. Dexter  Reason for evaluation: Gastric volvulus   Date of evaluation: 8/20/2024  Admit date: 8/20/2024  LOS: Day 0    SUBJECTIVE:   History of Chief Complaint:    Monalisa Mcdowell is a 79 y.o. female who presented with abdominal pain. Patient admits to several years of indigestion. Her indigestion has progressively worsened over the last week. Endorses epigastric abdominal pain, bloating, inability to tolerate PO intake, nausea and vomiting which has also been progressive in severity over the last week. Yesterday, her sxs became unbearable prompting ED evaluation. She reports minimal flatus and last BM Sunday. She began to vomiting uncontrollably during my interview and thus I terminated questioning and turned by attention to NG insertion as it was unable to be placed by RN x3 in the ED.  Denies fever, chills, chest pain, sob, issues with urination.   Prior abdominal surgical history includes appendectomy, CCY, tubal ligation and laparoscopic insertion of a  shunt.       Past Medical History   has a past medical history of Acid reflux, Hyperlipidemia, Hypertension, and Thyroid disease.    Past Surgical History   has a past surgical history that includes Tonsillectomy; Cholecystectomy; Ovary removal; Appendectomy; Dilation and curettage of uterus; Colonoscopy; Urethral sling; and Cystoscopy.    Medications  Prior to Admission medications    Medication Sig Start Date End Date Taking? Authorizing Provider   diazePAM (VALIUM) 5 MG tablet Take 1 tablet by mouth nightly as needed for Sleep.   Yes Chad Pacheco MD   Lifitegrast (XIIDRA) 5 % SOLN Apply to eye   Yes Chad Pacheco MD   pantoprazole (PROTONIX) 40 MG tablet Take 1 tablet by mouth every morning (before breakfast)   Yes Chad Pacheco MD   citalopram

## 2024-08-20 NOTE — ED PROVIDER NOTES
the lungs as needed for Wheezing or Shortness of Breath      omeprazole (PRILOSEC) 20 MG delayed release capsule Take 1 capsule by mouth daily      metoprolol tartrate (LOPRESSOR) 25 MG tablet TAKE ONE TABLET BY MOUTH DAILY WITH FOOD 90 tablet 3    levothyroxine (SYNTHROID) 75 MCG tablet TAKE ONE TABLET BY MOUTH EVERY MORNING ON AN EMPTY STOMACH 90 tablet 3    buPROPion (WELLBUTRIN SR) 150 MG extended release tablet Take 1 tablet by mouth in the morning and 1 tablet in the evening.      calcipotriene (DOVONEX) 0.005 % ointment Apply topically 2 times daily Apply topically 2 times daily.      nortriptyline (PAMELOR) 25 MG capsule Take 2 capsules by mouth nightly      amLODIPine (NORVASC) 5 MG tablet Take 1 tablet by mouth daily      triamcinolone (NASACORT ALLERGY 24HR) 55 MCG/ACT nasal inhaler 2 sprays by Nasal route daily      cetirizine (ZYRTEC) 10 MG tablet Take 1 tablet by mouth daily      fluticasone (FLONASE) 50 MCG/ACT nasal spray 50 sprays by Nasal route 2 times daily  0    clobetasol (TEMOVATE) 0.05 % cream Apply 0.05 mg topically daily  0    aspirin 81 MG tablet Take 1 tablet by mouth daily      calcium-vitamin D (OSCAL-500) 500-200 MG-UNIT per tablet Take 1 tablet by mouth daily       Allergies   Allergen Reactions    Codeine     Codeine Itching    Corn Oil      Other reaction(s): Unknown    Formaldehyde Hives and Other (See Comments)     Other reaction(s): Unknown    Lactase-Lactobacillus      Other reaction(s): Unknown    Mobic [Meloxicam]     Morphine      Other reaction(s): confusion    Nitrofurantoin     Sulfa Antibiotics     Sulfa Antibiotics Itching       Nursing Notes Reviewed    Physical Exam:  Triage VS:    ED Triage Vitals   Encounter Vitals Group      BP       Systolic BP Percentile       Diastolic BP Percentile       Pulse       Resp       Temp       Temp src       SpO2       Weight       Height       Head Circumference       Peak Flow       Pain Score       Pain Loc       Pain Education        Exclude from Growth Chart        My pulse ox interpretation is - normal    General appearance:  No acute distress.   Skin:  Warm. Dry.   Eye:  Extraocular movements intact.     Ears, nose, mouth and throat:  Oral mucosa moist   Neck:  Trachea midline.   Extremity:  No swelling.  Normal ROM     Heart:  Regular rate and rhythm, normal S1 & S2, no extra heart sounds.    Perfusion:  intact  Respiratory:  Lungs clear to auscultation bilaterally.  Respirations nonlabored.     Abdominal:  Normal bowel sounds.  Soft.  Tenderness palpation upper abdomen with voluntary guarding without rebound, no lower abdominal tenderness palpation, no flank pain, no CVA tenderness, no central spinal tenderness  Back:  No CVA tenderness to palpation     Neurological:  Alert and oriented times 3.  No focal neuro deficits.             Psychiatric:  Appropriate    I have reviewed and interpreted all of the currently available lab results from this visit (if applicable):  No results found for this visit on 08/20/24.   Radiographs (if obtained):  Radiologist's Report Reviewed:  No results found.    EKG (if obtained): (All EKG's are interpreted by myself in the absence of a cardiologist)  Normal sinus rhythm, incomplete right bundle branch block, left anterior fascicular block, ventricular rate 96, VA interval 162, QRS duration 96, QTc 533, no ST elevation, nonspecific ST and T wave abnormality    MDM:    79-year-old female present with history seen above.  Patient is hypertensive on presentation at 219/104.  Otherwise vitals are nonactionable.  Patient was given hydralazine and pain control in the ED with improvement of blood pressure into the 150s.  Patient does have a white count of 12.6.  Hemoglobin is within normal limits.  Patient has a creatinine of 1.8 patients most recent creatinines have been similar .  Patient does have a anion gap of 22 bicarb is 26.  Lactate is 5.2.  Patient given fluids in the ED will repeat lactate after fluids.

## 2024-08-20 NOTE — ED NOTES
Three RNs attempted NGT placement to both nares. Unsuccessful. Notified General Surgery on call center who states they will notify Dr. Hughes. Transport request placed to take pt to .

## 2024-08-20 NOTE — FLOWSHEET NOTE
08/20/24 1906   Handoff   Communication Given Shift Handoff   Handoff Given To HIGINIO Falcon   Handoff Received From HIGINIO Silva   Handoff Communication Face to Face   Time Handoff Given 1906   End of Shift Check Performed Yes     EOS report given to HIGINIO Falcon. Pt in bed, call light within reach. Pt is stable, care is transferred.

## 2024-08-20 NOTE — PROGRESS NOTES
Pt does not meet the criteria for PRN electrolyte replacement as CrCl <30. Orders for PRN electrolyte replacement were discontinued and may be ordered individually as needed. Pharmacy will continue to monitor the patient.     Gabby Joseph PharmD, [facility], 8/20/2024 6:47 AM

## 2024-08-20 NOTE — ED NOTES
Pt left ED via stretcher in stable condition on PCU tele at this time with transport. All belongings sent with pt. Care transferred.

## 2024-08-20 NOTE — FLOWSHEET NOTE
08/20/24 1920   Vital Signs   Temp 98.9 °F (37.2 °C)   Temp Source Axillary   Pulse 96   Heart Rate Source Monitor   Respirations 18   BP (!) 154/74   MAP (Calculated) 101   BP Location Right upper arm   BP Method Automatic   Patient Position Semi fowlers   Oxygen Therapy   SpO2 90 %   O2 Device None (Room air)     Assessment done -in flowsheet.  Patient is alert and oriented x4.  Denies any pain.  Hypoactive bowel sound.  NG tube at right nares - connected to suction.  Call light within reach.

## 2024-08-20 NOTE — PROGRESS NOTES
Patient admitted to room 328 from ER. Patient oriented to room, call light, bed rails, phone, lights and bathroom. Patient instructed about the schedule of the day including: vital sign frequency, lab draws, possible tests, frequency of MD and staff rounds, daily weights, I &O's and prescribed diet. 20 Telemetry box in place, patient aware of placement and reason. Bed locked, in lowest position, side rails up 2/4, call light within reach.        Recliner Assessment  Patient is not able to demonstrated the ability to move from a reclining position to an upright position within the recliner. however patient is alert, oriented and able to provide informed consent       4 Eyes Skin Assessment     NAME:  Monalisa Mcdowell  YOB: 1945  MEDICAL RECORD NUMBER:  [id number]    The patient is being assessed for  Admission    I agree that at least one RN has performed a thorough Head to Toe Skin Assessment on the patient. ALL assessment sites listed below have been assessed.      Areas assessed by both nurses:    Head, Face, Ears, Shoulders, Back, Chest, Arms, Elbows, Hands, Sacrum. Buttock, Coccyx, Ischium, Legs. Feet and Heels, and Under Medical Devices         Does the Patient have a Wound? No noted wound(s)       Aly Prevention initiated by RN: No  Wound Care Orders initiated by RN: No    Pressure Injury (Stage 3,4, Unstageable, DTI, NWPT, and Complex wounds) if present, place Wound referral order by RN under : No    New Ostomies, if present place, Ostomy referral order under : No     Nurse 1 eSignature: Electronically signed by Shira Martínez RN on 8/20/24 at 9:13 AM EDT    **SHARE this note so that the co-signing nurse can place an eSignature**    Nurse 2 eSignature: Electronically signed by Liz Flores RN on 8/20/24 at 10:50 AM EDT

## 2024-08-20 NOTE — PROGRESS NOTES
VTE Prophylaxis Monitoring    Recent Labs     08/20/24  0357   CREATININE 1.8*     Recent Labs     08/20/24  0357   HGB 15.1   HCT 45.5        Estimated Creatinine Clearance: 22 mL/min (A) (based on SCr of 1.8 mg/dL (H)).  Wt Readings from Last 1 Encounters:   08/20/24 59.6 kg (131 lb 6.3 oz)       The guidance below is to provide initial recommendations for dosing. If recommended dose does not align well with patient's current clinical picture, communications with the care team will occur to determine most appropriate medication and dose.    Do not exceed enoxaparin 40mg daily or UFH 5000 units SUBQ TID in patients with epidurals, lumbar drains, or external ventricular drains    TABLE 1.  ENOXAPARIN ROUTINE PROPHYLAXIS DOSING (Medically ill, routine surgery)   Patient Weight (kg)     50.9 and below 51 - 100.9 101 - 150.9 151 - 174.9 175 or greater         Estimated CrCl  (ml/min) 30 or greater   30 mg SUBQ daily   40 mg SUBQ daily 30 mg SUBQ BID  40 mg SUBQ BID 60mg SUBQ BID      15-29 UFH 5000 units SUBQ BID   30 mg SUBQ daily 30 mg SUBQ daily 40 mg SUBQ daily   60 mg SUBQ daily      Less than 15 or Dialysis UFH 5000 units SUBQ BID   UFH 5000 units SUBQ TID UFH 7500 units SUBQ TID       Gabby Joseph RPH 8/20/2024 6:50 AM

## 2024-08-20 NOTE — H&P
Hospital Medicine History & Physical      PCP: Duane Oneil MD    Date of Admission: 8/20/2024    Date of Service: Pt seen/examined on 8/20/2024     Chief Complaint:    Chief Complaint   Patient presents with    Emesis     Pt with coffee ground emesis since yesterday afternoon. Mid abd pain.        History Of Present Illness:      The patient is a 79 y.o. female with hypertension, hyperlipidemia, and GERD who presents to [facility] with c/o nausea and vomiting.  Having coffee ground emesis.  She notes having epigastric abdominal pain.  Reports indigestion ongoing for months.  States she started having coffee ground emesis and epigastric abdominal pain yesterday.  She is here to visit her son.      BP elevated.  Renal function appears to be near baseline.  Lactic acid elevated and WBC.  Found to have gastric volvulus. Admitted for further management/care.  General surgery consulted.      Past Medical History:        Diagnosis Date    Acid reflux     Hyperlipidemia     Hypertension     Thyroid disease        Past Surgical History:        Procedure Laterality Date    APPENDECTOMY      CHOLECYSTECTOMY      COLONOSCOPY      CYSTOSCOPY      DILATION AND CURETTAGE OF UTERUS      OVARY REMOVAL      TONSILLECTOMY      URETHRAL SLING         Medications Prior to Admission:    Prior to Admission medications    Medication Sig Start Date End Date Taking? Authorizing Provider   diazePAM (VALIUM) 5 MG tablet Take 1 tablet by mouth nightly as needed for Sleep. Max Daily Amount: 5 mg   Yes Chad Pacheco MD   Lifitegrast (XIIDRA) 5 % SOLN Apply to eye   Yes Chad Pacheco MD   pantoprazole (PROTONIX) 40 MG tablet Take 1 tablet by mouth every morning (before breakfast)   Yes Chad Pacheco MD   citalopram (CELEXA) 10 MG tablet Take 3 tablets by mouth daily 5/15/21   Chad Pacheco MD   albuterol sulfate HFA (PROVENTIL;VENTOLIN;PROAIR) 108 (90 Base) MCG/ACT inhaler Inhale into the lungs as needed  LEUKOCYTESUR NEGATIVE 01/25/2024 03:55 PM    GLUCOSEU NEGATIVE 01/25/2024 03:55 PM       CULTURES  Results       Procedure Component Value Units Date/Time    Culture, Blood 1 [[id number]] Collected: 08/20/24 0514    Order Status: Sent Specimen: Blood Updated: 08/20/24 0551    Culture, Blood 2 [[id number]] Collected: 08/20/24 0514    Order Status: Sent Specimen: Blood Updated: 08/20/24 0538            EKG:  I have reviewed the EKG with the following interpretation:   Normal sinus rhythm, Incomplete right bundle branch block. Left anterior fascicular blockLeft ventricular hypertrophy ( R in aVL , Geoffrey product , Romhilt-Morales ). Nonspecific ST and T wave abnormality    RADIOLOGY  XR CHEST PORTABLE   Final Result   1. No acute cardiopulmonary process.   2. Moderate cardiomegaly.   3. Moderate hiatal hernia.         CT ABDOMEN PELVIS WO CONTRAST Additional Contrast? None   Final Result   1. Marked gastric distension with gastric outlet obstruction most likely due   to mesentero-axial gastric volvulus.   2. Hiatal hernia containing part of the gastric antrum measuring   approximately 9 cm.   3. Sigmoid diverticulosis.               Principal Problem:    Mesenteroaxial gastric volvulus  Resolved Problems:    * No resolved hospital problems. *        ASSESSMENT/PLAN:  #Gastric volvulus  -admitted for further management.  General surgery consulted  -NPO, IVF's  -pain control: Dilaudid prn  -antiemetics prn  -unable to place NG for decompression    #Sepsis, POA  -due to above  -Lactic acid 5.2-->4.6  -WBC 12  -IVF's for now.   -blood cx pending    #Hypertension  -BP elevated  -Hydralazine prn    #Depression, anxiety  -will resume home regimen when able.    #Hypothyroidism  -home dose of synthroid 75 mcg     #GERD  -IV PPI for now    DVT Prophylaxis: Lovenox held  Diet: Diet NPO  Code Status: DNR-PORFIRIO Hurtado FNP-C  8/20/2024

## 2024-08-20 NOTE — ED NOTES
Perfect Serve to MD: Pt admitted for gastric volvulus and obstruction. Pt requesting pain meds and none are ordered. Can you please place PRN IV pain meds? Please review. Thank you.

## 2024-08-20 NOTE — FLOWSHEET NOTE
08/20/24 0830   Vital Signs   Temp 98.3 °F (36.8 °C)   Temp Source Oral   Pulse 93   Heart Rate Source Monitor   Respirations 18   BP (!) 209/99   MAP (Calculated) 136   BP Location Left upper arm   BP Method Automatic   Patient Position Semi fowlers     Admission assessment & vital signs completed. Morning meds given per MAR. Dilaudid prn given for pt's pain 8/10. Pt denies any further needs at this time. Call light within reach, pt is stable.

## 2024-08-21 ENCOUNTER — APPOINTMENT (OUTPATIENT)
Dept: GENERAL RADIOLOGY | Age: 79
DRG: 392 | End: 2024-08-21
Payer: MEDICARE

## 2024-08-21 PROBLEM — R79.89 ELEVATED SERUM CREATININE: Status: ACTIVE | Noted: 2024-08-21

## 2024-08-21 LAB
AMORPH SED URNS QL MICRO: ABNORMAL /HPF
ANION GAP SERPL CALCULATED.3IONS-SCNC: 11 MMOL/L (ref 3–16)
BACTERIA URNS QL MICRO: ABNORMAL /HPF
BASOPHILS # BLD: 0.1 K/UL (ref 0–0.2)
BASOPHILS NFR BLD: 0.5 %
BILIRUB UR QL STRIP.AUTO: NEGATIVE
BUN SERPL-MCNC: 26 MG/DL (ref 7–20)
CALCIUM SERPL-MCNC: 9.1 MG/DL (ref 8.3–10.6)
CHLORIDE SERPL-SCNC: 101 MMOL/L (ref 99–110)
CLARITY UR: CLEAR
CO2 SERPL-SCNC: 28 MMOL/L (ref 21–32)
COLOR UR: YELLOW
CREAT SERPL-MCNC: 1.6 MG/DL (ref 0.6–1.2)
DEPRECATED RDW RBC AUTO: 13 % (ref 12.4–15.4)
EOSINOPHIL # BLD: 0 K/UL (ref 0–0.6)
EOSINOPHIL NFR BLD: 0.2 %
EPI CELLS #/AREA URNS HPF: ABNORMAL /HPF (ref 0–5)
GFR SERPLBLD CREATININE-BSD FMLA CKD-EPI: 32 ML/MIN/{1.73_M2}
GLUCOSE BLD-MCNC: 110 MG/DL (ref 70–99)
GLUCOSE BLD-MCNC: 110 MG/DL (ref 70–99)
GLUCOSE BLD-MCNC: 112 MG/DL (ref 70–99)
GLUCOSE BLD-MCNC: 115 MG/DL (ref 70–99)
GLUCOSE BLD-MCNC: 95 MG/DL (ref 70–99)
GLUCOSE SERPL-MCNC: 116 MG/DL (ref 70–99)
GLUCOSE UR STRIP.AUTO-MCNC: NEGATIVE MG/DL
HCT VFR BLD AUTO: 37.4 % (ref 36–48)
HGB BLD-MCNC: 12.1 G/DL (ref 12–16)
HGB UR QL STRIP.AUTO: ABNORMAL
INR PPP: 1 (ref 0.85–1.15)
KETONES UR STRIP.AUTO-MCNC: NEGATIVE MG/DL
LEUKOCYTE ESTERASE UR QL STRIP.AUTO: ABNORMAL
LYMPHOCYTES # BLD: 2.2 K/UL (ref 1–5.1)
LYMPHOCYTES NFR BLD: 15.9 %
MCH RBC QN AUTO: 28.1 PG (ref 26–34)
MCHC RBC AUTO-ENTMCNC: 32.2 G/DL (ref 31–36)
MCV RBC AUTO: 87.2 FL (ref 80–100)
MONOCYTES # BLD: 1.1 K/UL (ref 0–1.3)
MONOCYTES NFR BLD: 7.6 %
MUCOUS THREADS #/AREA URNS LPF: ABNORMAL /LPF
NEUTROPHILS # BLD: 10.7 K/UL (ref 1.7–7.7)
NEUTROPHILS NFR BLD: 75.8 %
NITRITE UR QL STRIP.AUTO: NEGATIVE
PERFORMED ON: ABNORMAL
PERFORMED ON: NORMAL
PH UR STRIP.AUTO: 7.5 [PH] (ref 5–8)
PLATELET # BLD AUTO: 191 K/UL (ref 135–450)
PMV BLD AUTO: 8.1 FL (ref 5–10.5)
POTASSIUM SERPL-SCNC: 3.7 MMOL/L (ref 3.5–5.1)
PROT UR STRIP.AUTO-MCNC: ABNORMAL MG/DL
PROTHROMBIN TIME: 13.4 SEC (ref 11.9–14.9)
RBC # BLD AUTO: 4.29 M/UL (ref 4–5.2)
RBC #/AREA URNS HPF: ABNORMAL /HPF (ref 0–4)
SODIUM SERPL-SCNC: 140 MMOL/L (ref 136–145)
SP GR UR STRIP.AUTO: 1.02 (ref 1–1.03)
UA DIPSTICK W REFLEX MICRO PNL UR: YES
URN SPEC COLLECT METH UR: ABNORMAL
UROBILINOGEN UR STRIP-ACNC: 0.2 E.U./DL
WBC # BLD AUTO: 14.1 K/UL (ref 4–11)
WBC #/AREA URNS HPF: ABNORMAL /HPF (ref 0–5)

## 2024-08-21 PROCEDURE — 99232 SBSQ HOSP IP/OBS MODERATE 35: CPT | Performed by: SURGERY

## 2024-08-21 PROCEDURE — 6370000000 HC RX 637 (ALT 250 FOR IP)

## 2024-08-21 PROCEDURE — 74240 X-RAY XM UPR GI TRC 1CNTRST: CPT

## 2024-08-21 PROCEDURE — 99233 SBSQ HOSP IP/OBS HIGH 50: CPT | Performed by: INTERNAL MEDICINE

## 2024-08-21 PROCEDURE — 80048 BASIC METABOLIC PNL TOTAL CA: CPT

## 2024-08-21 PROCEDURE — APPSS15 APP SPLIT SHARED TIME 0-15 MINUTES

## 2024-08-21 PROCEDURE — 2060000000 HC ICU INTERMEDIATE R&B

## 2024-08-21 PROCEDURE — 6360000002 HC RX W HCPCS

## 2024-08-21 PROCEDURE — 81001 URINALYSIS AUTO W/SCOPE: CPT

## 2024-08-21 PROCEDURE — 36415 COLL VENOUS BLD VENIPUNCTURE: CPT

## 2024-08-21 PROCEDURE — 6360000004 HC RX CONTRAST MEDICATION

## 2024-08-21 PROCEDURE — 6360000002 HC RX W HCPCS: Performed by: NURSE PRACTITIONER

## 2024-08-21 PROCEDURE — 2580000003 HC RX 258: Performed by: NURSE PRACTITIONER

## 2024-08-21 PROCEDURE — 2580000003 HC RX 258: Performed by: INTERNAL MEDICINE

## 2024-08-21 PROCEDURE — 85610 PROTHROMBIN TIME: CPT

## 2024-08-21 PROCEDURE — 85025 COMPLETE CBC W/AUTO DIFF WBC: CPT

## 2024-08-21 PROCEDURE — 2580000003 HC RX 258: Performed by: STUDENT IN AN ORGANIZED HEALTH CARE EDUCATION/TRAINING PROGRAM

## 2024-08-21 PROCEDURE — 6360000002 HC RX W HCPCS: Performed by: INTERNAL MEDICINE

## 2024-08-21 RX ORDER — HYDRALAZINE HYDROCHLORIDE 20 MG/ML
10 INJECTION INTRAMUSCULAR; INTRAVENOUS EVERY 6 HOURS PRN
Status: DISCONTINUED | OUTPATIENT
Start: 2024-08-21 | End: 2024-08-24

## 2024-08-21 RX ADMIN — DIATRIZOATE MEGLUMINE AND DIATRIZOATE SODIUM 30 ML: 600; 100 SOLUTION ORAL; RECTAL at 14:51

## 2024-08-21 RX ADMIN — HYDROMORPHONE HYDROCHLORIDE 0.25 MG: 1 INJECTION, SOLUTION INTRAMUSCULAR; INTRAVENOUS; SUBCUTANEOUS at 03:08

## 2024-08-21 RX ADMIN — HYDRALAZINE HYDROCHLORIDE 10 MG: 20 INJECTION INTRAMUSCULAR; INTRAVENOUS at 18:42

## 2024-08-21 RX ADMIN — DIMETHICONE, OXYBENZONE, AND PADIMATE O: 2; 2.5; 6.6 STICK TOPICAL at 15:54

## 2024-08-21 RX ADMIN — Medication 40 MG: at 21:44

## 2024-08-21 RX ADMIN — PANTOPRAZOLE SODIUM 40 MG: 40 INJECTION, POWDER, FOR SOLUTION INTRAVENOUS at 08:42

## 2024-08-21 RX ADMIN — SODIUM CHLORIDE, PRESERVATIVE FREE 10 ML: 5 INJECTION INTRAVENOUS at 21:47

## 2024-08-21 RX ADMIN — ENOXAPARIN SODIUM 30 MG: 100 INJECTION SUBCUTANEOUS at 08:50

## 2024-08-21 RX ADMIN — SODIUM CHLORIDE, POTASSIUM CHLORIDE, SODIUM LACTATE AND CALCIUM CHLORIDE: 600; 310; 30; 20 INJECTION, SOLUTION INTRAVENOUS at 06:33

## 2024-08-21 RX ADMIN — HYDROMORPHONE HYDROCHLORIDE 0.25 MG: 1 INJECTION, SOLUTION INTRAMUSCULAR; INTRAVENOUS; SUBCUTANEOUS at 21:49

## 2024-08-21 RX ADMIN — HYDRALAZINE HYDROCHLORIDE 10 MG: 20 INJECTION INTRAMUSCULAR; INTRAVENOUS at 09:22

## 2024-08-21 RX ADMIN — HYDROMORPHONE HYDROCHLORIDE 0.25 MG: 1 INJECTION, SOLUTION INTRAMUSCULAR; INTRAVENOUS; SUBCUTANEOUS at 15:52

## 2024-08-21 RX ADMIN — SODIUM CHLORIDE, PRESERVATIVE FREE 10 ML: 5 INJECTION INTRAVENOUS at 08:44

## 2024-08-21 ASSESSMENT — PAIN DESCRIPTION - DESCRIPTORS
DESCRIPTORS: ACHING
DESCRIPTORS: SORE
DESCRIPTORS: OTHER (COMMENT)
DESCRIPTORS: BURNING;ACHING

## 2024-08-21 ASSESSMENT — PAIN - FUNCTIONAL ASSESSMENT
PAIN_FUNCTIONAL_ASSESSMENT: ACTIVITIES ARE NOT PREVENTED

## 2024-08-21 ASSESSMENT — PAIN DESCRIPTION - ORIENTATION
ORIENTATION: RIGHT
ORIENTATION: RIGHT
ORIENTATION: MID
ORIENTATION: UPPER

## 2024-08-21 ASSESSMENT — PAIN DESCRIPTION - FREQUENCY
FREQUENCY: INTERMITTENT

## 2024-08-21 ASSESSMENT — PAIN SCALES - WONG BAKER
WONGBAKER_NUMERICALRESPONSE: HURTS A LITTLE BIT
WONGBAKER_NUMERICALRESPONSE: HURTS A LITTLE BIT

## 2024-08-21 ASSESSMENT — PAIN DESCRIPTION - ONSET
ONSET: ON-GOING

## 2024-08-21 ASSESSMENT — PAIN DESCRIPTION - LOCATION
LOCATION: HEAD
LOCATION: HEAD
LOCATION: THROAT
LOCATION: HEAD;JAW

## 2024-08-21 ASSESSMENT — PAIN DESCRIPTION - PAIN TYPE
TYPE: ACUTE PAIN

## 2024-08-21 ASSESSMENT — PAIN SCALES - GENERAL
PAINLEVEL_OUTOF10: 6
PAINLEVEL_OUTOF10: 0
PAINLEVEL_OUTOF10: 8
PAINLEVEL_OUTOF10: 7
PAINLEVEL_OUTOF10: 5
PAINLEVEL_OUTOF10: 7
PAINLEVEL_OUTOF10: 2
PAINLEVEL_OUTOF10: 4

## 2024-08-21 NOTE — FLOWSHEET NOTE
08/21/24 0737   Vital Signs   Temp 98.2 °F (36.8 °C)   Temp Source Oral   Pulse 83   Heart Rate Source Monitor   Respirations 18   BP (!) 195/93   MAP (Calculated) 127   BP Location Left upper arm   BP Method Automatic   Patient Position Semi fowlers   Pain Assessment   Pain Assessment None - Denies Pain   Oxygen Therapy   SpO2 96 %   O2 Device Nasal cannula   O2 Flow Rate (L/min) 2 L/min     AM assessment complete. Pt a&o x4. Family in room at bedside. Pt denies any pain or discomfort at this time. No n/v noted. States that she is belching up a sour taste. Bs present x4. LCTA. NG to R nare and bridled in place. Dark red substance noted to tubing. No edema noted. Dr Moscoso is aware of BP trend and PRN hydralazine ordered. Call light and bedside table within reach. Will continue to monitor.

## 2024-08-21 NOTE — PROGRESS NOTES
Occupational Therapy  Attempted OT evaluation. Patient with severe headache at this time. Will hold evaluation this morning and check back this afternoon as appropriate.       Shannan Burgos, OTR/L #[id number]

## 2024-08-21 NOTE — PROGRESS NOTES
Reassessment done - in flowsheet.  Patient is asleep but easy to wake up.  On O2 at 2L/min with normal breathing pattern.  Call light within reach.

## 2024-08-21 NOTE — PROGRESS NOTES
Pt called out and stated that her R nare is bothering her from NG tube. Adjusted NG by pushing it in a slight amount. Pt reports that it helped with the discomfort. C/o eye pain and ear pain. She is aware that she cannot have any dilaudid at this time. Lights dimmed to see if that would help. Pt states it has a little bit. PRN hydralazine given for BP. Call light and bedside table within reach.

## 2024-08-21 NOTE — PROGRESS NOTES
Pt voided 500ml in external catheter but when bladder scanned again 112ml noted, Manley catheter placed without difficulty. 150ml of yellow cloudy urine to drain bag. Pt states that she feels better.

## 2024-08-21 NOTE — PROGRESS NOTES
General Surgery  Daily Progress Note    Pt Name: Monalisa Mcdowell  Medical Record Number: [id number]  Date of Birth 1945   Today's Date: 8/21/2024  Admit date: 8/20/2024  LOS: Day 1    SUBJECTIVE  Feeling better this morning. Does still have some belching and indigestion.       OBJECTIVE  Vitals:    08/21/24 0359 08/21/24 0430 08/21/24 0737 08/21/24 0854   BP: (!) 182/79 (!) 176/84 (!) 195/93 (!) 189/90   Pulse: 84  83 80   Resp: 18  18    Temp: 98.1 °F (36.7 °C)  98.2 °F (36.8 °C)    TempSrc: Oral  Oral    SpO2: 94%  96%    Weight:       Height:           Gen: No distress. Alert.   Resp: Normal rate. Easy and unlabored. No accessory muscle use.   CV: Regular rate. Regular rhythm.   GI: +Improving TTP. Soft, Non-distended.  +bowel sounds.  Skin: Warm and dry. No nodule or rash on exposed extremities.       I/O last 3 completed shifts:  In: 845.1 [P.O.:360; IV Piggyback:485.1]  Out: 2350 [Emesis/NG output:2350]  I/O this shift:  In: 60 [P.O.:60]  Out: 300 [Urine:300]    LABS  CBC:   Recent Labs     08/20/24 0357 08/21/24  0554   WBC 12.6* 14.1*   HGB 15.1 12.1   HCT 45.5 37.4   MCV 86.1 87.2    191     BMP:   Recent Labs     08/20/24  0357 08/21/24  0554    140   K 3.6 3.7   CL 95* 101   CO2 26 28   BUN 31* 26*   CREATININE 1.8* 1.6*     LIVER PROFILE:   Recent Labs     08/20/24 0357   AST 19   ALT 13   LIPASE 129.0*   BILITOT 0.3   ALKPHOS 80     PT/INR:   Recent Labs     08/21/24  0554   PROTIME 13.4   INR 1.00     APTT: No results for input(s): \"APTT\" in the last 72 hours.  UA:No results for input(s): \"NITRITE\", \"COLORU\", \"PHUR\", \"LABCAST\", \"WBCUA\", \"RBCUA\", \"MUCUS\", \"TRICHOMONAS\", \"YEAST\", \"BACTERIA\", \"CLARITYU\", \"SPECGRAV\", \"LEUKOCYTESUR\", \"UROBILINOGEN\", \"BILIRUBINUR\", \"BLOODU\", \"GLUCOSEU\", \"AMORPHOUS\" in the last 72 hours.    Invalid input(s): \"KETONESU\"      IMAGING  XR ABDOMEN FOR NG/OG/NE TUBE PLACEMENT   Final Result   Enteric tube with tip and side-port in the stomach         XR CHEST

## 2024-08-21 NOTE — PROGRESS NOTES
O2 down to 87-88% - placed 2L/min O2 via nasal cannula and hooked to continuous pulse ox - perfectserve nocturnist.

## 2024-08-21 NOTE — PROGRESS NOTES
[facility]    Daily Progress Note for 2024 8:21 AM /[id number]  Monalisa Mcdowell : 1945 Age: 79 y.o. Sex: female  Length of Stay:  1    Interval History:      CC: F/U Emesis (Pt with coffee ground emesis since yesterday afternoon. Mid abd pain. )    Subjective:       Feels better than she was doing, has sour taste in her mouth. She used to be on CPAP. She has heartburn and acid taste in the mouth. She is not sure of her home medications. Abdominal pain much better.     Objective:     Vitals:    24 0338 24 0359 24 0430 24 0737   BP:  (!) 182/79 (!) 176/84 (!) 195/93   Pulse:  84  83   Resp:   18   Temp:  98.1 °F (36.7 °C)  98.2 °F (36.8 °C)   TempSrc:  Oral  Oral   SpO2:  94%  96%   Weight:       Height:              Intake/Output Summary (Last 24 hours) at 2024 0821  Last data filed at 2024 0737  Gross per 24 hour   Intake 845.09 ml   Output 2650 ml   Net -1804.91 ml     Body mass index is 22.4 kg/m².    Physical Exam:  General: Cooperative, pleasant/Ill appearing, on  Nasal cannula  HEENT:  Head: normocephalic,atraumatic, anicteric sclera, clear conjunctiva  Neck: Normal size, Jugular venous pulsations: normal  Respiratory:unlabored breathing, clear to auscultation with no crackles, wheezes rhonchi  Heart: Regular rate and rhythm, S1, S2-normal, No murmurs  Abdomen: soft, nondistended, nontender, normoactive bowel sounds,  Neurological/Psych: Alert and oriented times three, no focal neurological deficits, Mood and affect appropriate.  Skin: No obvious rashes    Extremities:  no edema, Pedal pulses 2+ bilaterally    Scheduled Medications:  sodium chloride flush, 5-40 mL, 2 times per day  enoxaparin, 30 mg, Daily  pantoprazole (PROTONIX) 40 mg in sodium chloride (PF) 0.9 % 10 mL injection, 40 mg, Daily        PRN Medications:  sodium chloride flush, 5-40 mL, PRN  sodium chloride, , PRN  polyethylene glycol, 17 g, Daily  kg/m².       Impression/Plan:        ASSESSMENT/PLAN:    #Gastric volvulus  -admitted for further management.  General surgery consulted  -NPO, IVF's  -pain control: Dilaudid prn  -antiemetics prn  -NG was placed for decompression     #Sepsis, POA  -due to above  -Lactic acid 5.2-->4.6  -WBC 12  -IVF's for now.   -blood cx pending     #Hypertension  -BP elevated  -Hydralazine prn     #Depression, anxiety  -will resume home regimen when able.     #Hypothyroidism  -home dose of synthroid 75 mcg      #GERD  -IV PPI for now     DVT Prophylaxis: Lovenox held    Management discussed with RN,     Electronically signed by: Iwona Moscoso DO, 8/21/2024 8:21 AM

## 2024-08-21 NOTE — PROGRESS NOTES
Pt c/o headache to right side of head that was \"pulsing.\" VSS, BP lower than this am. Then reported that her lower abdomen felt \"cold.\" Unable to urinate with external catheter even after trying. Bladder scan with results 676. Notified Dr Moscoso of all findings and she ordered busch catheter. Pt denies any need for PRN dilaudid at this time.

## 2024-08-21 NOTE — CARE COORDINATION
Case Management Assessment  Initial Evaluation    Date/Time of Evaluation: 8/21/2024 9:18 AM  Assessment Completed by: Melissa Cavazos    If patient is discharged prior to next notation, then this note serves as note for discharge by case management.    Patient Name: Monalisa Mcdowell                   YOB: 1945  Diagnosis: Mesenteroaxial gastric volvulus [K31.89]                   Date / Time: 8/20/2024  3:28 AM    Patient Admission Status: Inpatient   Readmission Risk (Low < 19, Mod (19-27), High > 27): Readmission Risk Score: 12.4    Current PCP: Duane Oneil MD  PCP verified by CM? Yes    Chart Reviewed: Yes      History Provided by: Patient  Patient Orientation: Alert and Oriented    Patient Cognition: Alert    Hospitalization in the last 30 days (Readmission):  No    If yes, Readmission Assessment in  Navigator will be completed.    Advance Directives:      Code Status: DNR-CCA   Patient's Primary Decision Maker is: Patient Declined (Legal Next of Kin Remains as Decision Maker)    Primary Decision Maker: Padilla Mcdowell - Child - [phone]    Discharge Planning:    Patient lives with: Children Type of Home: House  Primary Care Giver: Family  Patient Support Systems include: Children, Family Members   Current Financial resources: Medicare  Current community resources: None  Current services prior to admission: Meals On Wheels, Durable Medical Equipment            Current DME: Walker, Cane            Type of Home Care services:  Meals on Wheels    ADLS  Prior functional level: Assistance with the following:, Mobility, Shopping, Housework, Cooking  Current functional level: Assistance with the following:, Mobility, Shopping, Housework, Cooking    PT AM-PAC:   /24  OT AM-PAC:   /24    Family can provide assistance at DC: Yes  Would you like Case Management to discuss the discharge plan with any other family members/significant others, and if so, who? Yes (sons)  Plans to Return to Present  Housing: Yes  Other Identified Issues/Barriers to RETURNING to current housing: none  Potential Assistance needed at discharge: Skilled Nursing Facility            Potential DME:    Patient expects to discharge to: Unknown  Plan for transportation at discharge:      Financial    Payor: MEDICARE / Plan: MEDICARE PART A AND B / Product Type: *No Product type* /     Does insurance require precert for SNF: No    Potential assistance Purchasing Medications: No  Meds-to-Beds request: Yes      [id number] - [place], [place] - [address] - P [phone] - F [phone]  [address] [address]  Phone: [phone] Fax: [phone]    [facility] [id number] - [facility] [address] - P [phone] - F [phone]  [address] [address]  Phone: [phone] Fax: [phone]      Notes:    Factors facilitating achievement of predicted outcomes: Family support, Motivated, Cooperative, and Pleasant    Barriers to discharge: SBO, NG, NPO    Additional Case Management Notes: Reviewed chart and met with pt at beside,son and DIL present. From house with son and DIL in [place], [place]. IPTA. No  HC PTA. Uses walker and cane. Active with [facility] in her area. No needs identified at this time. Plan TBD, will continue to monitor.      The Plan for Transition of Care is related to the following treatment goals of Mesenteroaxial gastric volvulus [K31.89]    IF APPLICABLE: The Patient and/or patient representative Monalisa and her family were provided with a choice of provider and agrees with the discharge plan. Freedom of choice list with basic dialogue that supports the patient's individualized plan of care/goals and shares the quality data associated with the providers was provided to:     Patient Representative Name:       The Patient and/or Patient Representative Agree with the Discharge Plan?      Melissa Cavazos  Case Management

## 2024-08-21 NOTE — PLAN OF CARE
Problem: Pain  Goal: Verbalizes/displays adequate comfort level or baseline comfort level  Outcome: Progressing  Flowsheets (Taken 8/21/2024 0034)  Verbalizes/displays adequate comfort level or baseline comfort level:   Encourage patient to monitor pain and request assistance   Assess pain using appropriate pain scale   Implement non-pharmacological measures as appropriate and evaluate response   Administer analgesics based on type and severity of pain and evaluate response     Problem: Safety - Adult  Goal: Free from fall injury  Outcome: Progressing  Flowsheets (Taken 8/21/2024 0034)  Free From Fall Injury: Instruct family/caregiver on patient safety     Problem: Gastrointestinal - Adult  Goal: Minimal or absence of nausea and vomiting  Outcome: Progressing  Flowsheets (Taken 8/21/2024 0034)  Minimal or absence of nausea and vomiting:   Maintain NPO status until nausea and vomiting are resolved   Nasogastric tube to low intermittent suction as ordered   Administer ordered antiemetic medications as needed

## 2024-08-22 ENCOUNTER — APPOINTMENT (OUTPATIENT)
Dept: CT IMAGING | Age: 79
DRG: 392 | End: 2024-08-22
Payer: MEDICARE

## 2024-08-22 ENCOUNTER — APPOINTMENT (OUTPATIENT)
Dept: GENERAL RADIOLOGY | Age: 79
DRG: 392 | End: 2024-08-22
Payer: MEDICARE

## 2024-08-22 LAB
ANION GAP SERPL CALCULATED.3IONS-SCNC: 15 MMOL/L (ref 3–16)
BASOPHILS # BLD: 0 K/UL (ref 0–0.2)
BASOPHILS NFR BLD: 0.4 %
BUN SERPL-MCNC: 19 MG/DL (ref 7–20)
CALCIUM SERPL-MCNC: 9.1 MG/DL (ref 8.3–10.6)
CHLORIDE SERPL-SCNC: 98 MMOL/L (ref 99–110)
CO2 SERPL-SCNC: 25 MMOL/L (ref 21–32)
CREAT SERPL-MCNC: 1.3 MG/DL (ref 0.6–1.2)
DEPRECATED RDW RBC AUTO: 12.9 % (ref 12.4–15.4)
EOSINOPHIL # BLD: 0 K/UL (ref 0–0.6)
EOSINOPHIL NFR BLD: 0.2 %
GFR SERPLBLD CREATININE-BSD FMLA CKD-EPI: 41 ML/MIN/{1.73_M2}
GLUCOSE BLD-MCNC: 100 MG/DL (ref 70–99)
GLUCOSE BLD-MCNC: 92 MG/DL (ref 70–99)
GLUCOSE BLD-MCNC: 92 MG/DL (ref 70–99)
GLUCOSE BLD-MCNC: 97 MG/DL (ref 70–99)
GLUCOSE BLD-MCNC: 98 MG/DL (ref 70–99)
GLUCOSE SERPL-MCNC: 104 MG/DL (ref 70–99)
HCT VFR BLD AUTO: 39.9 % (ref 36–48)
HGB BLD-MCNC: 12.8 G/DL (ref 12–16)
INR PPP: 0.98 (ref 0.85–1.15)
LYMPHOCYTES # BLD: 1.7 K/UL (ref 1–5.1)
LYMPHOCYTES NFR BLD: 13.1 %
MAGNESIUM SERPL-MCNC: 1.8 MG/DL (ref 1.8–2.4)
MCH RBC QN AUTO: 28.5 PG (ref 26–34)
MCHC RBC AUTO-ENTMCNC: 32.2 G/DL (ref 31–36)
MCV RBC AUTO: 88.3 FL (ref 80–100)
MONOCYTES # BLD: 0.8 K/UL (ref 0–1.3)
MONOCYTES NFR BLD: 6.2 %
NEUTROPHILS # BLD: 10.1 K/UL (ref 1.7–7.7)
NEUTROPHILS NFR BLD: 80.1 %
PERFORMED ON: ABNORMAL
PERFORMED ON: NORMAL
PHOSPHATE SERPL-MCNC: 2.3 MG/DL (ref 2.5–4.9)
PLATELET # BLD AUTO: 183 K/UL (ref 135–450)
PMV BLD AUTO: 8 FL (ref 5–10.5)
POTASSIUM SERPL-SCNC: 3.4 MMOL/L (ref 3.5–5.1)
PROTHROMBIN TIME: 13.2 SEC (ref 11.9–14.9)
RBC # BLD AUTO: 4.51 M/UL (ref 4–5.2)
SODIUM SERPL-SCNC: 138 MMOL/L (ref 136–145)
WBC # BLD AUTO: 12.7 K/UL (ref 4–11)

## 2024-08-22 PROCEDURE — 2060000000 HC ICU INTERMEDIATE R&B

## 2024-08-22 PROCEDURE — 6360000002 HC RX W HCPCS: Performed by: INTERNAL MEDICINE

## 2024-08-22 PROCEDURE — 74019 RADEX ABDOMEN 2 VIEWS: CPT

## 2024-08-22 PROCEDURE — 97530 THERAPEUTIC ACTIVITIES: CPT

## 2024-08-22 PROCEDURE — 99233 SBSQ HOSP IP/OBS HIGH 50: CPT | Performed by: INTERNAL MEDICINE

## 2024-08-22 PROCEDURE — 99232 SBSQ HOSP IP/OBS MODERATE 35: CPT | Performed by: SURGERY

## 2024-08-22 PROCEDURE — 97166 OT EVAL MOD COMPLEX 45 MIN: CPT

## 2024-08-22 PROCEDURE — 70450 CT HEAD/BRAIN W/O DYE: CPT

## 2024-08-22 PROCEDURE — 6360000002 HC RX W HCPCS

## 2024-08-22 PROCEDURE — 6370000000 HC RX 637 (ALT 250 FOR IP)

## 2024-08-22 PROCEDURE — 6370000000 HC RX 637 (ALT 250 FOR IP): Performed by: INTERNAL MEDICINE

## 2024-08-22 PROCEDURE — 2580000003 HC RX 258: Performed by: STUDENT IN AN ORGANIZED HEALTH CARE EDUCATION/TRAINING PROGRAM

## 2024-08-22 PROCEDURE — APPSS15 APP SPLIT SHARED TIME 0-15 MINUTES

## 2024-08-22 PROCEDURE — 84100 ASSAY OF PHOSPHORUS: CPT

## 2024-08-22 PROCEDURE — 85025 COMPLETE CBC W/AUTO DIFF WBC: CPT

## 2024-08-22 PROCEDURE — 80048 BASIC METABOLIC PNL TOTAL CA: CPT

## 2024-08-22 PROCEDURE — 83735 ASSAY OF MAGNESIUM: CPT

## 2024-08-22 PROCEDURE — 97162 PT EVAL MOD COMPLEX 30 MIN: CPT

## 2024-08-22 PROCEDURE — 2580000003 HC RX 258: Performed by: INTERNAL MEDICINE

## 2024-08-22 PROCEDURE — 36415 COLL VENOUS BLD VENIPUNCTURE: CPT

## 2024-08-22 PROCEDURE — 85610 PROTHROMBIN TIME: CPT

## 2024-08-22 PROCEDURE — 97535 SELF CARE MNGMENT TRAINING: CPT

## 2024-08-22 RX ORDER — CARBOXYMETHYLCELLULOSE SODIUM 10 MG/ML
1 GEL OPHTHALMIC EVERY 4 HOURS PRN
Status: DISCONTINUED | OUTPATIENT
Start: 2024-08-22 | End: 2024-08-22

## 2024-08-22 RX ORDER — POTASSIUM CHLORIDE 7.45 MG/ML
10 INJECTION INTRAVENOUS
Status: COMPLETED | OUTPATIENT
Start: 2024-08-22 | End: 2024-08-22

## 2024-08-22 RX ORDER — CARBOXYMETHYLCELLULOSE SODIUM 10 MG/ML
2 GEL OPHTHALMIC EVERY 4 HOURS PRN
Status: DISCONTINUED | OUTPATIENT
Start: 2024-08-22 | End: 2024-08-25 | Stop reason: HOSPADM

## 2024-08-22 RX ORDER — SODIUM CHLORIDE, SODIUM LACTATE, POTASSIUM CHLORIDE, CALCIUM CHLORIDE 600; 310; 30; 20 MG/100ML; MG/100ML; MG/100ML; MG/100ML
INJECTION, SOLUTION INTRAVENOUS CONTINUOUS
Status: DISCONTINUED | OUTPATIENT
Start: 2024-08-22 | End: 2024-08-22

## 2024-08-22 RX ORDER — LABETALOL HYDROCHLORIDE 5 MG/ML
10 INJECTION, SOLUTION INTRAVENOUS EVERY 4 HOURS PRN
Status: DISCONTINUED | OUTPATIENT
Start: 2024-08-22 | End: 2024-08-24

## 2024-08-22 RX ORDER — DEXTROSE, SODIUM CHLORIDE, SODIUM LACTATE, POTASSIUM CHLORIDE, AND CALCIUM CHLORIDE 5; .6; .31; .03; .02 G/100ML; G/100ML; G/100ML; G/100ML; G/100ML
INJECTION, SOLUTION INTRAVENOUS CONTINUOUS
Status: DISCONTINUED | OUTPATIENT
Start: 2024-08-22 | End: 2024-08-24

## 2024-08-22 RX ORDER — CARBOXYMETHYLCELLULOSE SODIUM 10 MG/ML
2 GEL OPHTHALMIC 3 TIMES DAILY
Status: DISCONTINUED | OUTPATIENT
Start: 2024-08-22 | End: 2024-08-25 | Stop reason: HOSPADM

## 2024-08-22 RX ADMIN — ENOXAPARIN SODIUM 30 MG: 100 INJECTION SUBCUTANEOUS at 12:44

## 2024-08-22 RX ADMIN — SODIUM CHLORIDE, PRESERVATIVE FREE 10 ML: 5 INJECTION INTRAVENOUS at 08:25

## 2024-08-22 RX ADMIN — CARBOXYMETHYLCELLULOSE SODIUM 1 DROP: 10 GEL OPHTHALMIC at 15:57

## 2024-08-22 RX ADMIN — SODIUM CHLORIDE, PRESERVATIVE FREE 10 ML: 5 INJECTION INTRAVENOUS at 21:38

## 2024-08-22 RX ADMIN — HYDRALAZINE HYDROCHLORIDE 10 MG: 20 INJECTION INTRAMUSCULAR; INTRAVENOUS at 08:17

## 2024-08-22 RX ADMIN — SODIUM CHLORIDE, SODIUM LACTATE, POTASSIUM CHLORIDE, CALCIUM CHLORIDE AND DEXTROSE MONOHYDRATE: 5; 600; 310; 30; 20 INJECTION, SOLUTION INTRAVENOUS at 21:34

## 2024-08-22 RX ADMIN — HYDRALAZINE HYDROCHLORIDE 10 MG: 20 INJECTION INTRAMUSCULAR; INTRAVENOUS at 00:16

## 2024-08-22 RX ADMIN — SODIUM CHLORIDE: 9 INJECTION, SOLUTION INTRAVENOUS at 08:20

## 2024-08-22 RX ADMIN — Medication 40 MG: at 08:16

## 2024-08-22 RX ADMIN — HYDROMORPHONE HYDROCHLORIDE 0.25 MG: 1 INJECTION, SOLUTION INTRAMUSCULAR; INTRAVENOUS; SUBCUTANEOUS at 04:38

## 2024-08-22 RX ADMIN — SODIUM CHLORIDE, POTASSIUM CHLORIDE, SODIUM LACTATE AND CALCIUM CHLORIDE: 600; 310; 30; 20 INJECTION, SOLUTION INTRAVENOUS at 15:54

## 2024-08-22 RX ADMIN — LABETALOL HYDROCHLORIDE 10 MG: 5 INJECTION, SOLUTION INTRAVENOUS at 21:37

## 2024-08-22 RX ADMIN — BENZOCAINE: 200 SPRAY DENTAL; ORAL; PERIODONTAL at 11:37

## 2024-08-22 RX ADMIN — BENZOCAINE: 200 SPRAY DENTAL; ORAL; PERIODONTAL at 21:29

## 2024-08-22 RX ADMIN — HYDRALAZINE HYDROCHLORIDE 10 MG: 20 INJECTION INTRAMUSCULAR; INTRAVENOUS at 15:57

## 2024-08-22 RX ADMIN — SODIUM CHLORIDE: 9 INJECTION, SOLUTION INTRAVENOUS at 08:23

## 2024-08-22 RX ADMIN — CARBOXYMETHYLCELLULOSE SODIUM 2 DROP: 10 GEL OPHTHALMIC at 21:39

## 2024-08-22 RX ADMIN — Medication 40 MG: at 21:35

## 2024-08-22 RX ADMIN — POTASSIUM CHLORIDE 10 MEQ: 7.46 INJECTION, SOLUTION INTRAVENOUS at 08:24

## 2024-08-22 RX ADMIN — POTASSIUM CHLORIDE 10 MEQ: 7.46 INJECTION, SOLUTION INTRAVENOUS at 09:39

## 2024-08-22 ASSESSMENT — PAIN SCALES - GENERAL
PAINLEVEL_OUTOF10: 4
PAINLEVEL_OUTOF10: 8
PAINLEVEL_OUTOF10: 5
PAINLEVEL_OUTOF10: 3

## 2024-08-22 ASSESSMENT — PAIN DESCRIPTION - DESCRIPTORS
DESCRIPTORS: BURNING
DESCRIPTORS: ACHING

## 2024-08-22 ASSESSMENT — PAIN DESCRIPTION - FREQUENCY: FREQUENCY: CONTINUOUS

## 2024-08-22 ASSESSMENT — PAIN SCALES - WONG BAKER: WONGBAKER_NUMERICALRESPONSE: HURTS A LITTLE BIT

## 2024-08-22 ASSESSMENT — PAIN - FUNCTIONAL ASSESSMENT
PAIN_FUNCTIONAL_ASSESSMENT: ACTIVITIES ARE NOT PREVENTED
PAIN_FUNCTIONAL_ASSESSMENT: ACTIVITIES ARE NOT PREVENTED

## 2024-08-22 ASSESSMENT — PAIN DESCRIPTION - LOCATION
LOCATION: THROAT
LOCATION: THROAT

## 2024-08-22 ASSESSMENT — PAIN DESCRIPTION - ONSET: ONSET: ON-GOING

## 2024-08-22 ASSESSMENT — PAIN DESCRIPTION - PAIN TYPE: TYPE: ACUTE PAIN

## 2024-08-22 ASSESSMENT — PAIN DESCRIPTION - ORIENTATION
ORIENTATION: UPPER
ORIENTATION: UPPER

## 2024-08-22 NOTE — PROGRESS NOTES
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 \"          Discharge Recommendations: SNF  DME needs for discharge: Defer to facility       Therapy recommendation for EMS Transport: requires transport by cot due to pt needs A x 2 for safe transfers and pt with poor sitting balance/tolerance    Therapy recommendations for staff:   Assist of 1 for sitting EOB    History of Present Illness:   Per 8/20 H&P from Taylor Hurtado, GAURAV-CNP:    \"The patient is a 79 y.o. female with hypertension, hyperlipidemia, and GERD who presents to [facility] with c/o nausea and vomiting.  Having coffee ground emesis.  She notes having epigastric abdominal pain.  Reports indigestion ongoing for months.  States she started having coffee ground emesis and epigastric abdominal pain yesterday.  She is here to visit her son.       BP elevated.  Renal function appears to be near baseline.  Lactic acid elevated and WBC.  Found to have gastric volvulus. Admitted for further management/care.  General surgery consulted. \"    Dx with gastric volvulus  Sepsis    AM-PAC Mobility Score    AM-PAC Inpatient Mobility Raw Score : 12         Subjective  Patient lying reclined in bed with no family present.  Pt agreeable to this PT session. RN cleared pt for therapy, CXR arriving soon    Cognition    A&O  from toes to mid thigh; fingers to mid forearm; bilaterally; neuropathy    Coordination  WFL    Tone  WNL    Balance  Static Sitting:  Fair +; CGA  Dynamic Sitting:  Fair +; CGA   Comments: EOB    Static Standing: Fair ; Min A   Dynamic Standing: Not tested; Not Tested  Comments: Static stand at EOB with RW    Posture  Seated: Forward head and neck  Standing: Forward head and neck    Bed Mobility   Supine to Sit:    Min A x2  Sit to Supine:   Mod A x2  Rolling:   Not Tested   Scooting in sitting: Min A    Scooting in supine:  Max A  x2  Bridging:  Not Tested  Comments: HOB slightly elevated    Transfer Training     Sit to stand:   Min A    Stand to sit:   Min A    Bed to/from Chair:  Not Tested with use of N/A  Comments: Static stand at EOB with RW for support    Gait gait deferred due to fatigue; pt ambulated 0 ft.   Comment: Additionally, pt limited by NG suction    Stair Training deferred, pt unsafe/ not appropriate to complete stairs at this time  Comments: Limited by fatigue     Therapeutic Exercises Initiated  deferred secondary to treatment focus on functional mobility    Positioning Needs   Pt in bed, no alarm needed, positioned in proper neutral alignment and pressure relief provided.   Call light provided and all needs within reach  RN aware of pt position/status    Other Activities  Don/doff socks    Patient/Family Education   Pt educated on role of inpatient PT, POC, importance of continued activity, DC recommendations, safety awareness, transfer techniques, and calling for assist with mobility.      Assessment  Pt seen today for physical therapy Evaluation & Treatment. Pt demonstrated decreased Activity tolerance, Balance, Safety, and Strength as well as decreased independence with Ambulation, Bed Mobility , and Transfers.     Pt is a pleasant and motivated individual participating fully in therapy session to the best of her abilities. Her home support is limited due to family working during the day.

## 2024-08-22 NOTE — PLAN OF CARE
Problem: Discharge Planning  Goal: Discharge to home or other facility with appropriate resources  8/21/2024 2153 by Terrie Triplett RN  Outcome: Progressing  Flowsheets (Taken 8/21/2024 2153)  Discharge to home or other facility with appropriate resources: Identify barriers to discharge with patient and caregiver     Problem: Pain  Goal: Verbalizes/displays adequate comfort level or baseline comfort level  8/21/2024 2153 by Terrie Triplett RN  Outcome: Progressing  Flowsheets (Taken 8/21/2024 2153)  Verbalizes/displays adequate comfort level or baseline comfort level:   Encourage patient to monitor pain and request assistance   Assess pain using appropriate pain scale   Implement non-pharmacological measures as appropriate and evaluate response     Problem: Safety - Adult  Goal: Free from fall injury  8/21/2024 2153 by Terrie Triplett RN  Outcome: Progressing  Flowsheets (Taken 8/21/2024 2153)  Free From Fall Injury: Instruct family/caregiver on patient safety     Problem: Gastrointestinal - Adult  Goal: Minimal or absence of nausea and vomiting  8/21/2024 2153 by Terrie Triplett RN  Outcome: Progressing  Flowsheets (Taken 8/21/2024 2153)  Minimal or absence of nausea and vomiting:   Administer IV fluids as ordered to ensure adequate hydration   Maintain NPO status until nausea and vomiting are resolved   Provide nonpharmacologic comfort measures as appropriate     Problem: Skin/Tissue Integrity  Goal: Absence of new skin breakdown  Description: 1.  Monitor for areas of redness and/or skin breakdown  2.  Assess vascular access sites hourly  3.  Every 4-6 hours minimum:  Change oxygen saturation probe site  4.  Every 4-6 hours:  If on nasal continuous positive airway pressure, respiratory therapy assess nares and determine need for appliance change or resting period.  8/21/2024 2153 by Terrie Triplett RN  Outcome: Progressing

## 2024-08-22 NOTE — FLOWSHEET NOTE
08/22/24 0807   Vital Signs   Temp 98.1 °F (36.7 °C)   Temp Source Oral   Pulse 92   Heart Rate Source Monitor   Respirations 18   BP (!) 190/79   MAP (Calculated) 116   BP Location Right upper arm   BP Method Automatic   Patient Position Semi fowlers   Pain Assessment   Pain Assessment None - Denies Pain   Oxygen Therapy   SpO2 93 %     AM assessment complete. Pt a&o x4. Denies any pain or discomfort. States that she is having itching to legs from psoriasis. States that lotion will help. Xray notified that surgery is waiting for the results for next plan in care. No edema noted. LCTA. NG still in place with dark secretions to tubing. Manley in place. States that her mouth is burning with throat spray. Small white patch to back of throat on R side. Surgery ordered new spray. Will notify Dr Moscoso to have her look as well. Call light and bedside table within reach. Will continue to monitor.

## 2024-08-22 NOTE — FLOWSHEET NOTE
08/21/24 2140 08/21/24 2149   Vital Signs   Temp 98.6 °F (37 °C)  --    Temp Source Oral  --    Pulse 91  --    Heart Rate Source Monitor  --    Respirations 16  --    BP (!) 172/81  --    MAP (Calculated) 111  --    BP Location Right lower arm  --    BP Method Automatic  --    Patient Position Semi fowlers  --    Pain Assessment   Pain Assessment  --  0-10   Pain Level  --  7   Patient's Stated Pain Goal  --  0 - No pain   Pain Location  --  Head   Pain Orientation  --  Upper   Pain Descriptors  --  Aching   Functional Pain Assessment  --  Activities are not prevented   Pain Type  --  Acute pain   Pain Frequency  --  Intermittent   Pain Onset  --  On-going   Non-Pharmaceutical Pain Intervention(s)  --  Rest   Opioid-Induced Sedation   POSS Score  --  1   Oxygen Therapy   SpO2 93 %  --    O2 Device None (Room air)  --      Assessment done - in flowsheet.  Patient is alert and oriented x4.  On room air with normal breathing pattern.  Call light within reach.

## 2024-08-22 NOTE — FLOWSHEET NOTE
08/22/24 0432   Vital Signs   Temp 98.3 °F (36.8 °C)   Temp Source Oral   Pulse 92   Heart Rate Source Monitor   Respirations 18   BP (!) 176/81   MAP (Calculated) 113   BP Location Right upper arm   BP Method Automatic   Patient Position Semi fowlers   Opioid-Induced Sedation   POSS Score 1   RASS   Rogers Agitation Sedation Scale (RASS) 0   Oxygen Therapy   SpO2 94 %   O2 Device None (Room air)   Height and Weight   Weight - Scale 61.7 kg (136 lb)   Weight Method Actual;Bed scale   BMI (Calculated) 22.7     Patient is awake, alert and oriented.  NG tube to LWCS.  Manley catheter with yellowish urine.  Call light within reach.

## 2024-08-22 NOTE — PROGRESS NOTES
Order for IVF has . Dr Moscoso aware and she stated that she wanted them reordered and would do it.

## 2024-08-22 NOTE — PROGRESS NOTES
Lifitegrast eye drops ordered however pt does not have them nor is family able to bring them in. Called pharmacy and spoke with Alex, he is aware and will change orders to therapeutic interchange.

## 2024-08-22 NOTE — CARE COORDINATION
Met with pt at bedside. Pt son and daughter in law present. Plan for pt to go to SNF at OR. Pt plans to go back to SNF in [place] where she lives if no surgery needed. If pt does have surgery pt will go to SNF locally. Pt and family plans to discuss options of possible SNF that are [place] and will let staff know.

## 2024-08-22 NOTE — PROGRESS NOTES
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]  Admit Date:  8/20/2024  Precautions/Restrictions/WB Status/ Lines/ Wounds/ Oxygen: Fall risk, Bed/chair alarm, Lines (IV and NG tube), Telemetry, and Continuous pulse oximetry    Pt seen for cotreatment this date due to complexity of condition    Treatment Time:  700-752,860-937   Treatment Number:  1  Timed Code Treatment Minutes: 32 minutes  Total Treatment Minutes:  42  minutes    Patient Goals for Therapy: \"not stated  \"          Discharge Recommendations: SNF  DME needs for discharge: Defer to facility       Therapy recommendations for staff:   Assist of 1 for transfers with use of rolling walker (RW) and gait belt to/from chair    History of Present Illness: per H&P   79 y.o. female with hypertension, hyperlipidemia, and GERD who presents to [facility] with c/o nausea and vomiting.  Having coffee ground emesis.  She notes having epigastric abdominal pain.  Reports indigestion ongoing for months.  States she started having coffee ground emesis and epigastric abdominal pain yesterday.  She is here to visit her son.       pmhx of normal pressure hydrocephalus s/p  shunt, HTN, hypothyroidism and depression/anxiety    AM-PAC Score:       Subjective:  Patient lying reclined in bed with no family present.   Pt agreeable to this OT session. Yes     Cognition:    A&O Person, Place, Time, and Situation   Able to follow 2 step commands    Pain:   Yes  Location: everything hurts  Rating: severe /10  Pain Medicine Status: RN notified    Activity Tolerance:   Pt completed therapy session with fatigue  pain     BP (mmHg) HR (bpm) SpO2 (%) on RA Comments   Supine at rest 154/80 98 95    Seated at EOB       Standing       End of session           Preadmission Environment:   Pt lives with                                         with family  None      ADLs:  Dressing:      UE:   Not Tested  LE:    Max A  to don socks     Bathing:    UE:  Not Tested  LE:  Not Tested    Eating:   Independent-eating ice cubes from cup    Toileting:  Not Tested    Grooming/hygiene: Not Tested      Positioning Needs:   Pt in bed, alarm set, call light provided and all needs within reach .     Ther Ex / Activities Initiated:   N/A    Patient/Family Education:   Pt educated on role of inpatient OT, plan of care, importance of continued activity, Calling for assist with mobility.    CHF Education  NA    Assessment:  Pt seen for Occupational therapy evaluation in acute care setting.  Pt demonstrated decreased Activity tolerance, ADLs, IADLs, Balance , Bed mobility, Strength, and Transfers. Pt functioning below baseline and will likely benefit from skilled occupational therapy services to maximize safety and independence.   Pt has significant pain and limited mobility. The pt unable to don socks and needed max assist. MD determining if pt needs surgery.     Recommending SNF upon discharge as patient functioning well below baseline, demonstrates good rehab potential and unable to return home due to burden of care beyond caregiver ability.    Goal(s) :   To be met in 3 Visits:  Bed to toilet/BSC:       Min A and with use of RW  Pt will complete 3/3 CHF goals     N/A    To be met in 5 Visits:  Supine to/from Sit in preparation for ADL task:   CGA  Toileting        Min A and with use of RW  Grooming       SBA  Upper Body Dressing:      SBA  Lower Body Dressing:      Mod A  Pt to demonstrate UE therapeutic exs x 15 reps with minimal cues    Rehabilitation Potential: Fair  Strengths for achieving goals include: Pt cooperative   Barriers to achieving goals include:  Complexity of condition    Plan:  To be seen 3-5 x/wk while in acute care setting for therapeutic exercises, bed mobility, transfers, family/patient education, ADL/IADL retraining, and energy conservation

## 2024-08-22 NOTE — PROGRESS NOTES
General Surgery  Daily Progress Note    Pt Name: Monalisa Mcdowell  Medical Record Number: [id number]  Date of Birth 1945   Today's Date: 8/22/2024  Admit date: 8/20/2024  LOS: Day 2    SUBJECTIVE  Having pain in nose and throat. Complaining of persistent headache. Denies abdominal pain.       OBJECTIVE  Vitals:    08/22/24 0432 08/22/24 0508 08/22/24 0615 08/22/24 0807   BP: (!) 176/81  (!) 161/81 (!) 190/79   Pulse: 92  95 92   Resp: 18 18  18   Temp: 98.3 °F (36.8 °C)   98.1 °F (36.7 °C)   TempSrc: Oral   Oral   SpO2: 94%  95% 93%   Weight: 61.7 kg (136 lb)      Height:           Gen: No distress. Alert.   Resp: Normal rate. Easy and unlabored. No accessory muscle use.   CV: Regular rate. Regular rhythm.   GI: +Improving TTP. Soft, Non-distended.  +bowel sounds.  Skin: Warm and dry. No nodule or rash on exposed extremities.       I/O last 3 completed shifts:  In: 2335.1 [P.O.:120; I.V.:2215.1]  Out: 1950 [Urine:1600; Emesis/NG output:350]  No intake/output data recorded.    LABS  CBC:   Recent Labs     08/20/24 0357 08/21/24  0554 08/22/24  0519   WBC 12.6* 14.1* 12.7*   HGB 15.1 12.1 12.8   HCT 45.5 37.4 39.9   MCV 86.1 87.2 88.3    191 183     BMP:   Recent Labs     08/20/24  0357 08/21/24  0554 08/22/24  0519    140 138   K 3.6 3.7 3.4*   CL 95* 101 98*   CO2 26 28 25   PHOS  --   --  2.3*   BUN 31* 26* 19   CREATININE 1.8* 1.6* 1.3*     LIVER PROFILE:   Recent Labs     08/20/24  0357   AST 19   ALT 13   LIPASE 129.0*   BILITOT 0.3   ALKPHOS 80     PT/INR:   Recent Labs     08/21/24  0554 08/22/24  0519   PROTIME 13.4 13.2   INR 1.00 0.98     APTT: No results for input(s): \"APTT\" in the last 72 hours.  UA:  Recent Labs     08/21/24  1056   COLORU Yellow   PHUR 7.5   WBCUA 3-5   RBCUA 5-10*   MUCUS Rare*   BACTERIA 2+*   CLARITYU Clear   LEUKOCYTESUR SMALL*   UROBILINOGEN 0.2   BILIRUBINUR Negative   BLOODU TRACE-INTACT*   GLUCOSEU Negative   AMORPHOUS 2+         IMAGING  XR ABDOMEN (2 VIEWS)    Final Result   1. Unchanged large hiatal hernia.         FL UGI   Final Result   Gastric volvulus with persistent gastric outlet obstruction.      Findings were communicated to the ordering provider via telephone immediately   post examination.         XR ABDOMEN FOR NG/OG/NE TUBE PLACEMENT   Final Result   Enteric tube with tip and side-port in the stomach         XR CHEST PORTABLE   Final Result   1. No acute cardiopulmonary process.   2. Moderate cardiomegaly.   3. Moderate hiatal hernia.         CT ABDOMEN PELVIS WO CONTRAST Additional Contrast? None   Final Result   Addendum (preliminary) 1 of 1   ADDENDUM:      calling EDAttempted: zCORE: Arabella George,08/20/2024 6:08 AM   * :   * :   * :      connected Dr. Keane with Dr. Owen Taveraompleted: zCORE: Arabella George,08/20/2024 6:09 AM            Final   1. Marked gastric distension with gastric outlet obstruction most likely due   to mesentero-axial gastric volvulus.   2. Hiatal hernia containing part of the gastric antrum measuring   approximately 9 cm.   3. Sigmoid diverticulosis.               ASSESSMENT:  Monalisa Mcdowell is a 79 y.o. female with a pmhx of normal pressure hydrocephalus s/p  shunt, HTN, hypothyroidism and depression/anxiety who presented with progressively intensifying epigastric abdominal pain with associated indigestion, bloating and profuse n/v.      CT 8/20 reviewed. Showed marked gastric distension with GOO 2/2 mesenteroaxial gastric volvulus. HH present. It appears that HH has been present since 2021. Imaging suggesting this is from outside hospital and imaging unavailable for personal review.      Gastric volvulus with secondary GOO  Hiatal hernia contributing to above  Hypertensive urgency  Elevated Cr - unclear baseline     Of note, patient is just here visiting family. Currently resides in [place].      CODE STATUS - DNR-CCA    BP still elevated  WBC improving  No fevers  Cr continues to improve    UGI yesterday did

## 2024-08-22 NOTE — PROGRESS NOTES
[place] [facility]    Daily Progress Note for 2024 2:30 PM /0328-01  Monalisa Mcdowell : 1945 Age: 79 y.o. Sex: female  Length of Stay:  2    Interval History:      CC: F/U Emesis (Pt with coffee ground emesis since yesterday afternoon. Mid abd pain. )    Subjective:       Had some headache which is better, has a sore spot in one place on her throat. But aside from that she feels much better. She is supposed to be on some eye drops she was taking at home.     Objective:     Vitals:    24 0508 24 0615 24 0807 24 1134   BP:  (!) 161/81 (!) 190/79 (!) 172/82   Pulse:  95 92 92   Resp: 18  18 18   Temp:   98.1 °F (36.7 °C) 98 °F (36.7 °C)   TempSrc:   Oral Oral   SpO2:  95% 93% 92%   Weight:       Height:              Intake/Output Summary (Last 24 hours) at 2024 1430  Last data filed at 2024 1351  Gross per 24 hour   Intake 2275.11 ml   Output 2100 ml   Net 175.11 ml     Body mass index is 22.63 kg/m².    Physical Exam:  General: Cooperative, pleasant/Ill appearing, on  Nasal cannula  HEENT:  Head: normocephalic,atraumatic, anicteric sclera, clear conjunctiva  Neck: Normal size, Jugular venous pulsations: normal  Respiratory:unlabored breathing, clear to auscultation with no crackles, wheezes rhonchi  Heart: Regular rate and rhythm, S1, S2-normal, No murmurs  Abdomen: soft, nondistended, nontender, normoactive bowel sounds,  Neurological/Psych: Alert and oriented times three, no focal neurological deficits, Mood and affect appropriate.  Skin: No obvious rashes    Extremities:  no edema, Pedal pulses 2+ bilaterally    Scheduled Medications:  pantoprazole (PROTONIX) 40 mg in sodium chloride (PF) 0.9 % 10 mL injection, 40 mg, Q12H  sodium chloride flush, 5-40 mL, 2 times per day  enoxaparin, 30 mg, Daily        PRN Medications:  benzocaine, , 4x Daily PRN  hydrALAZINE, 10 mg, Q6H PRN  medicated lip balm, , PRN  diatrizoate meglumine-sodium, 30 mL, ONCE  PRN  sodium chloride flush, 5-40 mL, PRN  sodium chloride, , PRN  polyethylene glycol, 17 g, Daily PRN  acetaminophen, 650 mg, Q6H PRN   Or  acetaminophen, 650 mg, Q6H PRN  prochlorperazine, 10 mg, Q8H PRN   Or  prochlorperazine, 10 mg, Q6H PRN  HYDROmorphone, 0.25 mg, Q4H PRN  phenol, 1 spray, Q2H PRN          Data Review:      Laboratory Data Reviewed:    CBC:  Lab Results   Component Value Date    WBC 12.7 (H) 08/22/2024    HGB 12.8 08/22/2024    HCT 39.9 08/22/2024    MCV 88.3 08/22/2024     08/22/2024         Basic Metabolic Panel  Lab Results   Component Value Date/Time     08/22/2024 05:19 AM    CL 98 08/22/2024 05:19 AM    CO2 25 08/22/2024 05:19 AM    GLUCOSE 104 08/22/2024 05:19 AM    BUN 19 08/22/2024 05:19 AM    CREATININE 1.3 08/22/2024 05:19 AM       HEPATIC PANEL   Lab Results   Component Value Date/Time    AST 19 08/20/2024 03:57 AM    ALT 13 08/20/2024 03:57 AM       No results found for: \"CKTOTAL\", \"CKMB\", \"CKMBINDEX\", \"TROPONINI\"    Lab Results   Component Value Date/Time    INR 0.98 08/22/2024 05:19 AM    INR 1.00 08/21/2024 05:54 AM       Test Review:        Radiology reviewed:     CT HEAD WO CONTRAST   Preliminary Result   No acute intracranial abnormality.         XR ABDOMEN (2 VIEWS)   Final Result   1. Unchanged large hiatal hernia.         FL UGI   Final Result   Gastric volvulus with persistent gastric outlet obstruction.      Findings were communicated to the ordering provider via telephone immediately   post examination.         XR ABDOMEN FOR NG/OG/NE TUBE PLACEMENT   Final Result   Enteric tube with tip and side-port in the stomach         XR CHEST PORTABLE   Final Result   1. No acute cardiopulmonary process.   2. Moderate cardiomegaly.   3. Moderate hiatal hernia.         CT ABDOMEN PELVIS WO CONTRAST Additional Contrast? None   Final Result   Addendum (preliminary) 1 of 1   ADDENDUM:      calling EDAttempted: zCORE: Arabella George08/20/2024 6:08 AM   * :   * :   * :

## 2024-08-22 NOTE — PROGRESS NOTES
Pt assisted up to chair by this writer and PCA. She did well. Call light and bedside table within reach.

## 2024-08-22 NOTE — FLOWSHEET NOTE
08/22/24 0438   Pain Assessment   Pain Assessment 0-10   Pain Level 8   Patient's Stated Pain Goal 0 - No pain   Pain Location Head;Nose;Mouth   Pain Orientation Upper   Pain Descriptors Aching   Functional Pain Assessment Activities are not prevented   Pain Type Acute pain   Pain Frequency Continuous   Pain Onset On-going   Non-Pharmaceutical Pain Intervention(s) Rest     Pain in her head, nose and mouth - dilaudid given.

## 2024-08-23 LAB
ALBUMIN SERPL-MCNC: 3.8 G/DL (ref 3.4–5)
ANION GAP SERPL CALCULATED.3IONS-SCNC: 11 MMOL/L (ref 3–16)
ANION GAP SERPL CALCULATED.3IONS-SCNC: 16 MMOL/L (ref 3–16)
BASOPHILS # BLD: 0 K/UL (ref 0–0.2)
BASOPHILS NFR BLD: 0.4 %
BUN SERPL-MCNC: 12 MG/DL (ref 7–20)
BUN SERPL-MCNC: 16 MG/DL (ref 7–20)
CALCIUM SERPL-MCNC: 8.7 MG/DL (ref 8.3–10.6)
CALCIUM SERPL-MCNC: 8.7 MG/DL (ref 8.3–10.6)
CHLORIDE SERPL-SCNC: 98 MMOL/L (ref 99–110)
CHLORIDE SERPL-SCNC: 99 MMOL/L (ref 99–110)
CO2 SERPL-SCNC: 21 MMOL/L (ref 21–32)
CO2 SERPL-SCNC: 26 MMOL/L (ref 21–32)
CREAT SERPL-MCNC: 1 MG/DL (ref 0.6–1.2)
CREAT SERPL-MCNC: 1.1 MG/DL (ref 0.6–1.2)
DEPRECATED RDW RBC AUTO: 13 % (ref 12.4–15.4)
EOSINOPHIL # BLD: 0 K/UL (ref 0–0.6)
EOSINOPHIL NFR BLD: 0.3 %
GFR SERPLBLD CREATININE-BSD FMLA CKD-EPI: 50 ML/MIN/{1.73_M2}
GFR SERPLBLD CREATININE-BSD FMLA CKD-EPI: 56 ML/MIN/{1.73_M2}
GLUCOSE BLD-MCNC: 100 MG/DL (ref 70–99)
GLUCOSE BLD-MCNC: 122 MG/DL (ref 70–99)
GLUCOSE BLD-MCNC: 129 MG/DL (ref 70–99)
GLUCOSE BLD-MCNC: 154 MG/DL (ref 70–99)
GLUCOSE SERPL-MCNC: 106 MG/DL (ref 70–99)
GLUCOSE SERPL-MCNC: 120 MG/DL (ref 70–99)
HCT VFR BLD AUTO: 40.3 % (ref 36–48)
HGB BLD-MCNC: 13.1 G/DL (ref 12–16)
INR PPP: 1.06 (ref 0.85–1.15)
LYMPHOCYTES # BLD: 1.4 K/UL (ref 1–5.1)
LYMPHOCYTES NFR BLD: 15.6 %
MAGNESIUM SERPL-MCNC: 1.7 MG/DL (ref 1.8–2.4)
MCH RBC QN AUTO: 28.7 PG (ref 26–34)
MCHC RBC AUTO-ENTMCNC: 32.6 G/DL (ref 31–36)
MCV RBC AUTO: 88 FL (ref 80–100)
MONOCYTES # BLD: 0.7 K/UL (ref 0–1.3)
MONOCYTES NFR BLD: 7.1 %
NEUTROPHILS # BLD: 7 K/UL (ref 1.7–7.7)
NEUTROPHILS NFR BLD: 76.6 %
PERFORMED ON: ABNORMAL
PHOSPHATE SERPL-MCNC: 1.8 MG/DL (ref 2.5–4.9)
PHOSPHATE SERPL-MCNC: 3.3 MG/DL (ref 2.5–4.9)
PLATELET # BLD AUTO: 180 K/UL (ref 135–450)
PMV BLD AUTO: 8.3 FL (ref 5–10.5)
POTASSIUM SERPL-SCNC: 3.1 MMOL/L (ref 3.5–5.1)
POTASSIUM SERPL-SCNC: 3.3 MMOL/L (ref 3.5–5.1)
PROTHROMBIN TIME: 14 SEC (ref 11.9–14.9)
RBC # BLD AUTO: 4.58 M/UL (ref 4–5.2)
SODIUM SERPL-SCNC: 135 MMOL/L (ref 136–145)
SODIUM SERPL-SCNC: 136 MMOL/L (ref 136–145)
WBC # BLD AUTO: 9.2 K/UL (ref 4–11)

## 2024-08-23 PROCEDURE — 2500000003 HC RX 250 WO HCPCS: Performed by: INTERNAL MEDICINE

## 2024-08-23 PROCEDURE — 6360000002 HC RX W HCPCS: Performed by: INTERNAL MEDICINE

## 2024-08-23 PROCEDURE — 83735 ASSAY OF MAGNESIUM: CPT

## 2024-08-23 PROCEDURE — 85610 PROTHROMBIN TIME: CPT

## 2024-08-23 PROCEDURE — 85025 COMPLETE CBC W/AUTO DIFF WBC: CPT

## 2024-08-23 PROCEDURE — 36415 COLL VENOUS BLD VENIPUNCTURE: CPT

## 2024-08-23 PROCEDURE — APPSS15 APP SPLIT SHARED TIME 0-15 MINUTES

## 2024-08-23 PROCEDURE — 2580000003 HC RX 258: Performed by: INTERNAL MEDICINE

## 2024-08-23 PROCEDURE — 84100 ASSAY OF PHOSPHORUS: CPT

## 2024-08-23 PROCEDURE — 6370000000 HC RX 637 (ALT 250 FOR IP): Performed by: INTERNAL MEDICINE

## 2024-08-23 PROCEDURE — 80069 RENAL FUNCTION PANEL: CPT

## 2024-08-23 PROCEDURE — 2060000000 HC ICU INTERMEDIATE R&B

## 2024-08-23 PROCEDURE — 99232 SBSQ HOSP IP/OBS MODERATE 35: CPT | Performed by: INTERNAL MEDICINE

## 2024-08-23 PROCEDURE — 2580000003 HC RX 258: Performed by: STUDENT IN AN ORGANIZED HEALTH CARE EDUCATION/TRAINING PROGRAM

## 2024-08-23 PROCEDURE — 99232 SBSQ HOSP IP/OBS MODERATE 35: CPT | Performed by: SURGERY

## 2024-08-23 RX ORDER — POTASSIUM CHLORIDE 7.45 MG/ML
10 INJECTION INTRAVENOUS PRN
Status: DISCONTINUED | OUTPATIENT
Start: 2024-08-23 | End: 2024-08-25 | Stop reason: HOSPADM

## 2024-08-23 RX ORDER — POTASSIUM CHLORIDE 7.45 MG/ML
10 INJECTION INTRAVENOUS
Status: ACTIVE | OUTPATIENT
Start: 2024-08-23 | End: 2024-08-23

## 2024-08-23 RX ORDER — METOPROLOL TARTRATE 25 MG/1
25 TABLET, FILM COATED ORAL 2 TIMES DAILY
Status: DISCONTINUED | OUTPATIENT
Start: 2024-08-23 | End: 2024-08-25 | Stop reason: HOSPADM

## 2024-08-23 RX ORDER — ENOXAPARIN SODIUM 100 MG/ML
40 INJECTION SUBCUTANEOUS DAILY
Status: DISCONTINUED | OUTPATIENT
Start: 2024-08-24 | End: 2024-08-23

## 2024-08-23 RX ORDER — DIAZEPAM 5 MG
5 TABLET ORAL NIGHTLY PRN
Status: DISCONTINUED | OUTPATIENT
Start: 2024-08-23 | End: 2024-08-25 | Stop reason: HOSPADM

## 2024-08-23 RX ORDER — BUPROPION HYDROCHLORIDE 150 MG/1
150 TABLET, EXTENDED RELEASE ORAL DAILY
Status: DISCONTINUED | OUTPATIENT
Start: 2024-08-23 | End: 2024-08-23

## 2024-08-23 RX ORDER — ENOXAPARIN SODIUM 100 MG/ML
40 INJECTION SUBCUTANEOUS DAILY
Status: DISCONTINUED | OUTPATIENT
Start: 2024-08-23 | End: 2024-08-25 | Stop reason: HOSPADM

## 2024-08-23 RX ORDER — POTASSIUM CHLORIDE 1500 MG/1
40 TABLET, EXTENDED RELEASE ORAL PRN
Status: DISCONTINUED | OUTPATIENT
Start: 2024-08-23 | End: 2024-08-25 | Stop reason: HOSPADM

## 2024-08-23 RX ORDER — CITALOPRAM HYDROBROMIDE 20 MG/1
30 TABLET ORAL DAILY
Status: DISCONTINUED | OUTPATIENT
Start: 2024-08-23 | End: 2024-08-25 | Stop reason: HOSPADM

## 2024-08-23 RX ORDER — NORTRIPTYLINE HCL 25 MG
100 CAPSULE ORAL NIGHTLY
Status: DISCONTINUED | OUTPATIENT
Start: 2024-08-23 | End: 2024-08-25 | Stop reason: HOSPADM

## 2024-08-23 RX ORDER — BUPROPION HYDROCHLORIDE 150 MG/1
150 TABLET ORAL EVERY MORNING
COMMUNITY

## 2024-08-23 RX ORDER — BUPROPION HYDROCHLORIDE 150 MG/1
150 TABLET ORAL DAILY
Status: DISCONTINUED | OUTPATIENT
Start: 2024-08-23 | End: 2024-08-25 | Stop reason: HOSPADM

## 2024-08-23 RX ORDER — MAGNESIUM SULFATE 1 G/100ML
1000 INJECTION INTRAVENOUS ONCE
Status: COMPLETED | OUTPATIENT
Start: 2024-08-23 | End: 2024-08-23

## 2024-08-23 RX ORDER — MAGNESIUM SULFATE IN WATER 40 MG/ML
2000 INJECTION, SOLUTION INTRAVENOUS PRN
Status: DISCONTINUED | OUTPATIENT
Start: 2024-08-23 | End: 2024-08-25 | Stop reason: HOSPADM

## 2024-08-23 RX ADMIN — SODIUM PHOSPHATE, MONOBASIC, MONOHYDRATE AND SODIUM PHOSPHATE, DIBASIC, ANHYDROUS 15 MMOL: 142; 276 INJECTION, SOLUTION INTRAVENOUS at 18:00

## 2024-08-23 RX ADMIN — SODIUM CHLORIDE, SODIUM LACTATE, POTASSIUM CHLORIDE, CALCIUM CHLORIDE AND DEXTROSE MONOHYDRATE: 5; 600; 310; 30; 20 INJECTION, SOLUTION INTRAVENOUS at 21:53

## 2024-08-23 RX ADMIN — Medication 40 MG: at 09:15

## 2024-08-23 RX ADMIN — CARBOXYMETHYLCELLULOSE SODIUM 2 DROP: 10 GEL OPHTHALMIC at 15:54

## 2024-08-23 RX ADMIN — SODIUM CHLORIDE, SODIUM LACTATE, POTASSIUM CHLORIDE, CALCIUM CHLORIDE AND DEXTROSE MONOHYDRATE: 5; 600; 310; 30; 20 INJECTION, SOLUTION INTRAVENOUS at 11:46

## 2024-08-23 RX ADMIN — POTASSIUM CHLORIDE 10 MEQ: 7.46 INJECTION, SOLUTION INTRAVENOUS at 13:48

## 2024-08-23 RX ADMIN — POTASSIUM CHLORIDE 10 MEQ: 7.46 INJECTION, SOLUTION INTRAVENOUS at 15:44

## 2024-08-23 RX ADMIN — POTASSIUM CHLORIDE 10 MEQ: 7.46 INJECTION, SOLUTION INTRAVENOUS at 09:24

## 2024-08-23 RX ADMIN — POTASSIUM CHLORIDE 10 MEQ: 7.46 INJECTION, SOLUTION INTRAVENOUS at 11:33

## 2024-08-23 RX ADMIN — LABETALOL HYDROCHLORIDE 10 MG: 5 INJECTION, SOLUTION INTRAVENOUS at 18:22

## 2024-08-23 RX ADMIN — CARBOXYMETHYLCELLULOSE SODIUM 2 DROP: 10 GEL OPHTHALMIC at 21:45

## 2024-08-23 RX ADMIN — SODIUM CHLORIDE, PRESERVATIVE FREE 10 ML: 5 INJECTION INTRAVENOUS at 09:26

## 2024-08-23 RX ADMIN — Medication 40 MG: at 21:45

## 2024-08-23 RX ADMIN — LABETALOL HYDROCHLORIDE 10 MG: 5 INJECTION, SOLUTION INTRAVENOUS at 04:22

## 2024-08-23 RX ADMIN — ENOXAPARIN SODIUM 40 MG: 100 INJECTION SUBCUTANEOUS at 09:45

## 2024-08-23 RX ADMIN — MAGNESIUM SULFATE IN DEXTROSE 1000 MG: 10 INJECTION, SOLUTION INTRAVENOUS at 09:39

## 2024-08-23 RX ADMIN — CARBOXYMETHYLCELLULOSE SODIUM 2 DROP: 10 GEL OPHTHALMIC at 09:25

## 2024-08-23 NOTE — FLOWSHEET NOTE
08/22/24 2115   Vital Signs   Temp 98 °F (36.7 °C)   Temp Source Oral   Pulse 100   Heart Rate Source Monitor   Respirations 18   BP (!) 185/95   MAP (Calculated) 125   BP Location Left upper arm   BP Method Automatic   Patient Position Semi fowlers   Oxygen Therapy   SpO2 96 %   O2 Device None (Room air)     Pt assessment complete.  Pt lying in bed quietly.  Lung sounds clear.  Pt on room air.  Pt ST per monitor with HR of 100.  NG in R nare with bridle in place. NG to LCWS.  Bowel sounds are hypoactive.  Pt reports she is not passing gas.  Manley cath in place draining clear yellow urine.  Pt remains NPO except ice chips.  Nightly medications given.  PRN labetalol given for elevated BP.  Fresh ice chips provided per patient request. No other needs at this time.  Call light within reach.  Bed exit alarm on.

## 2024-08-23 NOTE — FLOWSHEET NOTE
08/22/24 2352   Vital Signs   Temp 98.1 °F (36.7 °C)   Temp Source Oral   Pulse 83   Heart Rate Source Monitor   Respirations 18   BP (!) 176/87   MAP (Calculated) 117   BP Location Left upper arm   BP Method Automatic   Patient Position Semi fowlers   Oxygen Therapy   SpO2 96 %   O2 Device None (Room air)     Pt reassessment complete.  No changes noted.  Denies needs.  Call light within reach. Bed exit alarm on.

## 2024-08-23 NOTE — PROGRESS NOTES
Pt's NG tube clamped per orders. Pt requesting to start with a cup of water for clears. She is aware that if she starts to get nausea to inform this writer or staff. Family at bedside and are in agreeance with POC.

## 2024-08-23 NOTE — PROGRESS NOTES
Pt reports that she does not feel like she can swallow medications at this time. Has eaten jello and drank another cup of water. Had 2 small BM's and is passing gas.

## 2024-08-23 NOTE — PROGRESS NOTES
[place] [facility]    Daily Progress Note for 2024 9:35 AM /0328-01  Monalisa Mcdowell : 1945 Age: 79 y.o. Sex: female  Length of Stay:  3    Interval History:      CC: F/U Emesis (Pt with coffee ground emesis since yesterday afternoon. Mid abd pain. )    Subjective:       Had some headache which is better, has a sore spot in one place on her throat. But aside from that she feels much better. She is supposed to be on some eye drops she was taking at home.     Objective:     Vitals:    24 2115 24 2352 24 0411 24 0703   BP: (!) 185/95 (!) 176/87 (!) 183/93 (!) 179/83   Pulse: 100 83 80 79   Resp: 18 18 16 16   Temp: 98 °F (36.7 °C) 98.1 °F (36.7 °C) 97.9 °F (36.6 °C) 98.4 °F (36.9 °C)   TempSrc: Oral Oral Oral Oral   SpO2: 96% 96% 98% 96%   Weight:   58.5 kg (128 lb 14.4 oz)    Height:              Intake/Output Summary (Last 24 hours) at 2024 0935  Last data filed at 2024 0703  Gross per 24 hour   Intake 267.9 ml   Output 2000 ml   Net -1732.1 ml     Body mass index is 21.45 kg/m².    Physical Exam:  General: Cooperative, pleasant/Ill appearing, on  Nasal cannula  HEENT:  Head: normocephalic,atraumatic, anicteric sclera, clear conjunctiva  Neck: Normal size, Jugular venous pulsations: normal  Respiratory:unlabored breathing, clear to auscultation with no crackles, wheezes rhonchi  Heart: Regular rate and rhythm, S1, S2-normal, No murmurs  Abdomen: soft, nondistended, nontender, normoactive bowel sounds,  Neurological/Psych: Alert and oriented times three, no focal neurological deficits, Mood and affect appropriate.  Skin: No obvious rashes    Extremities:  no edema, Pedal pulses 2+ bilaterally    Scheduled Medications:  potassium chloride, 10 mEq, Q1H  magnesium sulfate, 1,000 mg, Once  [START ON 2024] enoxaparin, 40 mg, Daily  carboxymethylcellulose PF, 2 drop, TID  pantoprazole (PROTONIX) 40 mg in sodium chloride (PF) 0.9 % 10 mL injection,  persistent gastric outlet obstruction.      Findings were communicated to the ordering provider via telephone immediately   post examination.         XR ABDOMEN FOR NG/OG/NE TUBE PLACEMENT   Final Result   Enteric tube with tip and side-port in the stomach         XR CHEST PORTABLE   Final Result   1. No acute cardiopulmonary process.   2. Moderate cardiomegaly.   3. Moderate hiatal hernia.         CT ABDOMEN PELVIS WO CONTRAST Additional Contrast? None   Final Result   Addendum (preliminary) 1 of 1   ADDENDUM:      calling EDAttempted: zCORE: Doris Arabella,08/20/2024 6:08 AM   * :   * :   * :      connected Dr. Keane with Dr. Owen Taveraompleted: zCORE: GeorgeArabella,08/20/2024 6:09 AM            Final   1. Marked gastric distension with gastric outlet obstruction most likely due   to mesentero-axial gastric volvulus.   2. Hiatal hernia containing part of the gastric antrum measuring   approximately 9 cm.   3. Sigmoid diverticulosis.               No results found for: \"LABA1C\"   Body mass index is 21.45 kg/m².       Impression/Plan:        ASSESSMENT/PLAN:    #Gastric volvulus  -admitted for further management.  General surgery consulted  -NPO, IVF's  -pain control: Dilaudid prn  -antiemetics prn  -NG was placed for decompression     #Sepsis, POA  -due to above  -Lactic acid 5.2-->4.6  -WBC 12  -IVF's for now.   -blood cx neg     #Hypertension  -BP elevated  -Hydralazine prn     #Depression, anxiety  -will resume home regimen when able.     #Hypothyroidism  -home dose of synthroid 75 mcg      #GERD  -IV PPI for now    HA  -CT head negative  -Feels better    Irritation from NG  - will reassess when NG is out    Electrolyte abnormalities - hypomagnesemia, hypokalemia, and low phos also  - Replete and recheck in pm     DVT Prophylaxis: Lovenox held    Management discussed with RN,     Electronically signed by: Iwona Moscoso DO, 8/23/2024 9:35 AM

## 2024-08-23 NOTE — PROGRESS NOTES
Called and got update medlist from [facility] in [place], [place]. Spoke with Sig there.

## 2024-08-23 NOTE — FLOWSHEET NOTE
08/23/24 0703   Vital Signs   Temp 98.4 °F (36.9 °C)   Temp Source Oral   Pulse 79   Heart Rate Source Monitor   Respirations 16   BP (!) 179/83   MAP (Calculated) 115   BP Location Left upper arm   BP Method Automatic   Patient Position Sitting   Pain Assessment   Pain Assessment None - Denies Pain   Oxygen Therapy   SpO2 96 %   O2 Device None (Room air)     AM assessment complete. Pt a&o x4. Denies any pain or discomfort except for the fact of NG tube being in throat and bothering nose. Still bridled in place. No edema. LCTA. BS have become more hypoactive. Denies passing any gas or having BM. Assisted to chair. IVF still infusing. Call light and bedside table within reach.

## 2024-08-23 NOTE — PROGRESS NOTES
Pt tolerating clear liquids well. After drinking 240ml of water and 240ml of chicken broth she stated she was feeling \"full.\" Advised her to stop until stomach could start to digest. States understanding. Assisted back to bed so she could rest. Call light and bedside table within reach.

## 2024-08-23 NOTE — PROGRESS NOTES
Physician Progress Note      PATIENT:               NORMA DIALLO  CSN #:                  [id number]  :                       1945  ADMIT DATE:       2024 3:28 AM  DISCH DATE:  RESPONDING  PROVIDER #:        SARAY BROOKE          QUERY TEXT:    Patient admitted with Gastric volvulus. Noted documentation of sepsis in H&P   and progress notes. No infection noted and patient not on antibiotics. In   order to support the diagnosis of sepsis, please include additional clinical   indicators in your documentation.  Or please document if the diagnosis of   sepsis has been ruled out after further study    The medical record reflects the following:  Risk Factors: Gastric volvulus, HTN  Clinical Indicators: Per progress notes \"Sepsis, POA due to above. Lactic acid   5.2-->4.6, WBC 12. IVF's for now. blood cx pending\".  Treatment: Blood cultures, IVF, serial labs, supportive care  Options provided:  -- SIRS due to Gastric volvulus, Sepsis was ruled out after study  -- Sepsis present as evidenced by, Please document evidence.  -- Other - I will add my own diagnosis  -- Disagree - Not applicable / Not valid  -- Disagree - Clinically unable to determine / Unknown  -- Refer to Clinical Documentation Reviewer    PROVIDER RESPONSE TEXT:    SIRS due to Gastric volvulus, sepsis was ruled out after study.    Query created by: Melissa Christy on 2024 11:14 AM      Electronically signed by:  SARAY BROOKE 2024 11:54 AM

## 2024-08-23 NOTE — PROGRESS NOTES
General Surgery  Daily Progress Note    Pt Name: Monalisa Mcdowell  Medical Record Number: [id number]  Date of Birth 1945   Today's Date: 8/23/2024  Admit date: 8/20/2024  LOS: Day 3    SUBJECTIVE  Only issue currently is the NG tube. No abdominal pain or bloating. Having some belching. Hasn't passed flatus.       OBJECTIVE  Vitals:    08/22/24 2115 08/22/24 2352 08/23/24 0411 08/23/24 0703   BP: (!) 185/95 (!) 176/87 (!) 183/93 (!) 179/83   Pulse: 100 83 80 79   Resp: 18 18 16 16   Temp: 98 °F (36.7 °C) 98.1 °F (36.7 °C) 97.9 °F (36.6 °C) 98.4 °F (36.9 °C)   TempSrc: Oral Oral Oral Oral   SpO2: 96% 96% 98% 96%   Weight:   58.5 kg (128 lb 14.4 oz)    Height:           Gen: No distress. Alert.   Resp: Normal rate. Easy and unlabored. No accessory muscle use.   CV: Regular rate. Regular rhythm.   GI: Non-tender. Soft, Non-distended.  +sluggish sounds.  Skin: Warm and dry. No nodule or rash on exposed extremities.       I/O last 3 completed shifts:  In: 267.9 [P.O.:60; I.V.:7.9; IV Piggyback:200]  Out: 2350 [Urine:2150; Emesis/NG output:200]  I/O this shift:  In: -   Out: 250 [Urine:250]    LABS  CBC:   Recent Labs     08/21/24  0554 08/22/24  0519 08/23/24  0512   WBC 14.1* 12.7* 9.2   HGB 12.1 12.8 13.1   HCT 37.4 39.9 40.3   MCV 87.2 88.3 88.0    183 180     BMP:   Recent Labs     08/21/24  0554 08/22/24  0519 08/23/24  0512    138 135*   K 3.7 3.4* 3.1*    98* 98*   CO2 28 25 21   PHOS  --  2.3* 1.8*   BUN 26* 19 16   CREATININE 1.6* 1.3* 1.1     LIVER PROFILE:   No results for input(s): \"AST\", \"ALT\", \"LIPASE\", \"AMYLASE\", \"BILIDIR\", \"BILITOT\", \"ALKPHOS\" in the last 72 hours.    Invalid input(s): \"ALB\"    PT/INR:   Recent Labs     08/21/24  0554 08/22/24  0519 08/23/24  0512   PROTIME 13.4 13.2 14.0   INR 1.00 0.98 1.06     APTT: No results for input(s): \"APTT\" in the last 72 hours.  UA:  Recent Labs     08/21/24  1056   COLORU Yellow   PHUR 7.5   WBCUA 3-5   RBCUA 5-10*   MUCUS Rare*   BACTERIA  2+*   CLARITYU Clear   LEUKOCYTESUR SMALL*   UROBILINOGEN 0.2   BILIRUBINUR Negative   BLOODU TRACE-INTACT*   GLUCOSEU Negative   AMORPHOUS 2+         IMAGING  CT HEAD WO CONTRAST   Preliminary Result   No acute intracranial abnormality.         XR ABDOMEN (2 VIEWS)   Final Result   1. Unchanged large hiatal hernia.         FL UGI   Final Result   Gastric volvulus with persistent gastric outlet obstruction.      Findings were communicated to the ordering provider via telephone immediately   post examination.         XR ABDOMEN FOR NG/OG/NE TUBE PLACEMENT   Final Result   Enteric tube with tip and side-port in the stomach         XR CHEST PORTABLE   Final Result   1. No acute cardiopulmonary process.   2. Moderate cardiomegaly.   3. Moderate hiatal hernia.         CT ABDOMEN PELVIS WO CONTRAST Additional Contrast? None   Final Result   Addendum (preliminary) 1 of 1   ADDENDUM:      calling EDAttempted: zCORE: Arabella George,08/20/2024 6:08 AM   * :   * :   * :      connected Dr. Keane with Dr. Owen Taveraompleted: zCORE: Arabella George,08/20/2024 6:09 AM            Final   1. Marked gastric distension with gastric outlet obstruction most likely due   to mesentero-axial gastric volvulus.   2. Hiatal hernia containing part of the gastric antrum measuring   approximately 9 cm.   3. Sigmoid diverticulosis.               ASSESSMENT:  Monalisa Mcdowell is a 79 y.o. female with a pmhx of normal pressure hydrocephalus s/p  shunt, HTN, hypothyroidism and depression/anxiety who presented with progressively intensifying epigastric abdominal pain with associated indigestion, bloating and profuse n/v.      CT 8/20 reviewed. Showed marked gastric distension with GOO 2/2 mesenteroaxial gastric volvulus. HH present. It appears that HH has been present since 2021. Imaging suggesting this is from outside hospital and imaging unavailable for personal review.      Gastric volvulus with secondary GOO  Hiatal hernia

## 2024-08-24 LAB
ALBUMIN SERPL-MCNC: 3.4 G/DL (ref 3.4–5)
ALBUMIN SERPL-MCNC: 3.4 G/DL (ref 3.4–5)
ALBUMIN SERPL-MCNC: 3.5 G/DL (ref 3.4–5)
ANION GAP SERPL CALCULATED.3IONS-SCNC: 10 MMOL/L (ref 3–16)
ANION GAP SERPL CALCULATED.3IONS-SCNC: 12 MMOL/L (ref 3–16)
ANION GAP SERPL CALCULATED.3IONS-SCNC: 12 MMOL/L (ref 3–16)
BACTERIA BLD CULT ORG #2: NORMAL
BACTERIA BLD CULT: NORMAL
BUN SERPL-MCNC: 10 MG/DL (ref 7–20)
BUN SERPL-MCNC: 11 MG/DL (ref 7–20)
BUN SERPL-MCNC: 9 MG/DL (ref 7–20)
CALCIUM SERPL-MCNC: 8.4 MG/DL (ref 8.3–10.6)
CALCIUM SERPL-MCNC: 8.6 MG/DL (ref 8.3–10.6)
CALCIUM SERPL-MCNC: 8.8 MG/DL (ref 8.3–10.6)
CHLORIDE SERPL-SCNC: 101 MMOL/L (ref 99–110)
CHLORIDE SERPL-SCNC: 102 MMOL/L (ref 99–110)
CHLORIDE SERPL-SCNC: 103 MMOL/L (ref 99–110)
CO2 SERPL-SCNC: 21 MMOL/L (ref 21–32)
CO2 SERPL-SCNC: 22 MMOL/L (ref 21–32)
CO2 SERPL-SCNC: 24 MMOL/L (ref 21–32)
CREAT SERPL-MCNC: 0.9 MG/DL (ref 0.6–1.2)
CREAT SERPL-MCNC: 1 MG/DL (ref 0.6–1.2)
CREAT SERPL-MCNC: 1.1 MG/DL (ref 0.6–1.2)
GFR SERPLBLD CREATININE-BSD FMLA CKD-EPI: 50 ML/MIN/{1.73_M2}
GFR SERPLBLD CREATININE-BSD FMLA CKD-EPI: 56 ML/MIN/{1.73_M2}
GFR SERPLBLD CREATININE-BSD FMLA CKD-EPI: 64 ML/MIN/{1.73_M2}
GLUCOSE BLD-MCNC: 105 MG/DL (ref 70–99)
GLUCOSE BLD-MCNC: 112 MG/DL (ref 70–99)
GLUCOSE BLD-MCNC: 131 MG/DL (ref 70–99)
GLUCOSE BLD-MCNC: 133 MG/DL (ref 70–99)
GLUCOSE SERPL-MCNC: 115 MG/DL (ref 70–99)
GLUCOSE SERPL-MCNC: 116 MG/DL (ref 70–99)
GLUCOSE SERPL-MCNC: 124 MG/DL (ref 70–99)
MAGNESIUM SERPL-MCNC: 1.8 MG/DL (ref 1.8–2.4)
PERFORMED ON: ABNORMAL
PHOSPHATE SERPL-MCNC: 2 MG/DL (ref 2.5–4.9)
PHOSPHATE SERPL-MCNC: 2 MG/DL (ref 2.5–4.9)
PHOSPHATE SERPL-MCNC: 2.3 MG/DL (ref 2.5–4.9)
POTASSIUM SERPL-SCNC: 3.2 MMOL/L (ref 3.5–5.1)
POTASSIUM SERPL-SCNC: 3.4 MMOL/L (ref 3.5–5.1)
POTASSIUM SERPL-SCNC: 4.1 MMOL/L (ref 3.5–5.1)
SODIUM SERPL-SCNC: 133 MMOL/L (ref 136–145)
SODIUM SERPL-SCNC: 135 MMOL/L (ref 136–145)
SODIUM SERPL-SCNC: 139 MMOL/L (ref 136–145)

## 2024-08-24 PROCEDURE — 80069 RENAL FUNCTION PANEL: CPT

## 2024-08-24 PROCEDURE — 2060000000 HC ICU INTERMEDIATE R&B

## 2024-08-24 PROCEDURE — 6370000000 HC RX 637 (ALT 250 FOR IP): Performed by: INTERNAL MEDICINE

## 2024-08-24 PROCEDURE — 99232 SBSQ HOSP IP/OBS MODERATE 35: CPT | Performed by: INTERNAL MEDICINE

## 2024-08-24 PROCEDURE — 2580000003 HC RX 258: Performed by: STUDENT IN AN ORGANIZED HEALTH CARE EDUCATION/TRAINING PROGRAM

## 2024-08-24 PROCEDURE — 2580000003 HC RX 258: Performed by: INTERNAL MEDICINE

## 2024-08-24 PROCEDURE — 83735 ASSAY OF MAGNESIUM: CPT

## 2024-08-24 PROCEDURE — 99232 SBSQ HOSP IP/OBS MODERATE 35: CPT | Performed by: SURGERY

## 2024-08-24 PROCEDURE — 6360000002 HC RX W HCPCS: Performed by: INTERNAL MEDICINE

## 2024-08-24 PROCEDURE — 36415 COLL VENOUS BLD VENIPUNCTURE: CPT

## 2024-08-24 PROCEDURE — 6360000002 HC RX W HCPCS: Performed by: STUDENT IN AN ORGANIZED HEALTH CARE EDUCATION/TRAINING PROGRAM

## 2024-08-24 RX ORDER — LABETALOL HYDROCHLORIDE 5 MG/ML
10 INJECTION, SOLUTION INTRAVENOUS EVERY 6 HOURS PRN
Status: DISCONTINUED | OUTPATIENT
Start: 2024-08-24 | End: 2024-08-25 | Stop reason: HOSPADM

## 2024-08-24 RX ORDER — AMLODIPINE BESYLATE 2.5 MG/1
2.5 TABLET ORAL DAILY
Status: DISCONTINUED | OUTPATIENT
Start: 2024-08-24 | End: 2024-08-24

## 2024-08-24 RX ORDER — POTASSIUM CHLORIDE 7.45 MG/ML
10 INJECTION INTRAVENOUS
Status: DISCONTINUED | OUTPATIENT
Start: 2024-08-24 | End: 2024-08-24

## 2024-08-24 RX ADMIN — BUPROPION HYDROCHLORIDE 150 MG: 150 TABLET, EXTENDED RELEASE ORAL at 09:06

## 2024-08-24 RX ADMIN — NORTRIPTYLINE HYDROCHLORIDE 100 MG: 25 CAPSULE ORAL at 21:24

## 2024-08-24 RX ADMIN — SODIUM CHLORIDE, PRESERVATIVE FREE 10 ML: 5 INJECTION INTRAVENOUS at 21:25

## 2024-08-24 RX ADMIN — CITALOPRAM HYDROBROMIDE 30 MG: 20 TABLET ORAL at 09:06

## 2024-08-24 RX ADMIN — CARBOXYMETHYLCELLULOSE SODIUM 2 DROP: 10 GEL OPHTHALMIC at 21:25

## 2024-08-24 RX ADMIN — POTASSIUM BICARBONATE 30 MEQ: 782 TABLET, EFFERVESCENT ORAL at 09:15

## 2024-08-24 RX ADMIN — METOPROLOL TARTRATE 25 MG: 25 TABLET, FILM COATED ORAL at 09:06

## 2024-08-24 RX ADMIN — LABETALOL HYDROCHLORIDE 10 MG: 5 INJECTION, SOLUTION INTRAVENOUS at 00:27

## 2024-08-24 RX ADMIN — POTASSIUM & SODIUM PHOSPHATES POWDER PACK 280-160-250 MG 250 MG: 280-160-250 PACK at 21:24

## 2024-08-24 RX ADMIN — Medication 40 MG: at 09:06

## 2024-08-24 RX ADMIN — CARBOXYMETHYLCELLULOSE SODIUM 2 DROP: 10 GEL OPHTHALMIC at 17:16

## 2024-08-24 RX ADMIN — SODIUM CHLORIDE, PRESERVATIVE FREE 10 ML: 5 INJECTION INTRAVENOUS at 09:32

## 2024-08-24 RX ADMIN — METOPROLOL TARTRATE 25 MG: 25 TABLET, FILM COATED ORAL at 21:25

## 2024-08-24 RX ADMIN — CARBOXYMETHYLCELLULOSE SODIUM 2 DROP: 10 GEL OPHTHALMIC at 09:07

## 2024-08-24 RX ADMIN — Medication 40 MG: at 21:24

## 2024-08-24 RX ADMIN — ENOXAPARIN SODIUM 40 MG: 100 INJECTION SUBCUTANEOUS at 09:07

## 2024-08-24 RX ADMIN — SODIUM CHLORIDE, SODIUM LACTATE, POTASSIUM CHLORIDE, CALCIUM CHLORIDE AND DEXTROSE MONOHYDRATE: 5; 600; 310; 30; 20 INJECTION, SOLUTION INTRAVENOUS at 04:01

## 2024-08-24 RX ADMIN — POTASSIUM CHLORIDE 10 MEQ: 7.46 INJECTION, SOLUTION INTRAVENOUS at 06:51

## 2024-08-24 NOTE — FLOWSHEET NOTE
08/24/24 0004   Vital Signs   Temp 97.7 °F (36.5 °C)   Temp Source Oral   Pulse 83   Heart Rate Source Monitor   Respirations 16   BP (!) 175/92   MAP (Calculated) 120   BP Location Left upper arm   BP Method Automatic   Patient Position Semi fowlers   Oxygen Therapy   SpO2 96 %   O2 Device None (Room air)     Pt reassessment complete.  No changes noted.  PRN labetalol given for elevated BP. Denies needs.  Call light within reach.

## 2024-08-24 NOTE — PROGRESS NOTES
[facility] GENERAL SURGERY DAILY PROGRESS NOTE    SUBJECTIVE: Awake, alert.  Feels better.     OBJECTIVE: CURRENT VITALS:  BP (!) 167/41   Pulse 69   Temp 97.9 °F (36.6 °C) (Oral)   Resp 16   Ht 1.651 m (5' 5\")   Wt 58.5 kg (128 lb 14.4 oz)   SpO2 94%   BMI 21.45 kg/m²          ABD: Soft.  Non distended.  Non tender.  Having BMs.  Passing gas.     LABS:    CBC:   Recent Labs     08/22/24  0519 08/23/24  0512   WBC 12.7* 9.2   RBC 4.51 4.58   HGB 12.8 13.1   HCT 39.9 40.3   MCV 88.3 88.0   RDW 12.9 13.0    180     BMP:   Recent Labs     08/23/24  1949 08/24/24  0147 08/24/24  0507    133* 135*   K 3.3* 3.4* 3.2*   CL 99 102 101   CO2 26 21 22   PHOS 3.3 2.3* 2.0*   BUN 12 11 10   CREATININE 1.0 1.0 0.9     Recent Labs     08/22/24  0519 08/23/24  0512 08/24/24  0507   MG 1.80 1.70* 1.80             ASSESSMENT:   Hiatal hernia with gastric volvulus s/p Ng decompression      PLAN:   D/C NG  ADAT  Ok for discharge with outpatient follow up at her home area in [place].          IVA BOOTH MD

## 2024-08-24 NOTE — PLAN OF CARE
Problem: Discharge Planning  Goal: Discharge to home or other facility with appropriate resources  Outcome: Progressing  Flowsheets (Taken 8/24/2024 1318)  Discharge to home or other facility with appropriate resources:   Identify barriers to discharge with patient and caregiver   Identify discharge learning needs (meds, wound care, etc)     Problem: Pain  Goal: Verbalizes/displays adequate comfort level or baseline comfort level  Outcome: Progressing     Problem: Skin/Tissue Integrity  Goal: Absence of new skin breakdown  Description: 1.  Monitor for areas of redness and/or skin breakdown  2.  Assess vascular access sites hourly  3.  Every 4-6 hours minimum:  Change oxygen saturation probe site  4.  Every 4-6 hours:  If on nasal continuous positive airway pressure, respiratory therapy assess nares and determine need for appliance change or resting period.  Outcome: Progressing

## 2024-08-24 NOTE — PROGRESS NOTES
[place] [facility]    Daily Progress Note for 2024 8:57 AM /0328-01  Monalisa Mcdowell : 1945 Age: 79 y.o. Sex: female  Length of Stay:  4    Interval History:      CC: F/U Emesis (Pt with coffee ground emesis since yesterday afternoon. Mid abd pain. )    Subjective:       Still has sore throat but chicken broth is very soothing. She had a BM yesterday, 3 or 4, passing gas overnight.     Objective:     Vitals:    24 2049 24 0004 24 0354 24 0804   BP: (!) 169/84 (!) 175/92 (!) 155/103 (!) 167/41   Pulse: 85 83 67 69   Resp: 16 16 16 16   Temp: 97.7 °F (36.5 °C) 97.7 °F (36.5 °C) 97.9 °F (36.6 °C)    TempSrc: Oral Oral Oral    SpO2: 97% 96% 97% 94%   Weight:       Height:              Intake/Output Summary (Last 24 hours) at 2024 0857  Last data filed at 2024 0804  Gross per 24 hour   Intake 3268.52 ml   Output 3300 ml   Net -31.48 ml     Body mass index is 21.45 kg/m².    Physical Exam:  General: Cooperative, pleasant/Ill appearing, on  Nasal cannula  HEENT:  Head: normocephalic,atraumatic, anicteric sclera, clear conjunctiva  Neck: Normal size, Jugular venous pulsations: normal  Respiratory:unlabored breathing, clear to auscultation with no crackles, wheezes rhonchi  Heart: Regular rate and rhythm, S1, S2-normal, No murmurs  Abdomen: soft, nondistended, nontender, normoactive bowel sounds,  Neurological/Psych: Alert and oriented times three, no focal neurological deficits, Mood and affect appropriate.  Skin: No obvious rashes    Extremities:  no edema, Pedal pulses 2+ bilaterally    Scheduled Medications:  [Held by provider] amLODIPine, 2.5 mg, Daily  enoxaparin, 40 mg, Daily  levothyroxine, 75 mcg, QAM AC  metoprolol tartrate, 25 mg, BID  nortriptyline, 100 mg, Nightly  citalopram, 30 mg, Daily  buPROPion, 150 mg, Daily  carboxymethylcellulose PF, 2 drop, TID  pantoprazole (PROTONIX) 40 mg in sodium chloride (PF) 0.9 % 10 mL injection, 40 mg,  and low phos also  - Replete and recheck in pm     DVT Prophylaxis: Lovenox held    Management discussed with RN,     Electronically signed by: Iwona Moscoso DO, 8/24/2024 8:57 AM

## 2024-08-24 NOTE — FLOWSHEET NOTE
08/24/24 0804   Vitals   Pulse 69   Heart Rate Source Monitor   Respirations 16   BP (!) 167/41   MAP (Calculated) 83   BP Location Left upper arm   BP Upper/Lower Upper   BP Method Automatic   Patient Position Semi fowlers   Oxygen Therapy   SpO2 94 %   O2 Device None (Room air)   O2 Flow Rate (L/min) 2 L/min     Shift assessment completed-see flow sheet. Patient in bed awake,alert and oriented x4.  Vitals obtained. Morning medications given.  NG removed per Surgery order. Patient tolerated well.  Manley intact/output noted.  Patient denies any needs at this time,call light within reach.

## 2024-08-24 NOTE — CARE COORDINATION
Spoke with Son and DIL at bedside re: SNF at dc. 1st choice is [facility] in [place]. Attempted to reach intake at [facility] but stated no intake available on weekends.    Spoke with Deinz at [facility] re: referral process for [facility]. Miguel took referral infoa nd will review and return call with decision once referral process is completed. Nereida aware that pt likely will dc in AM and family will transport to [place].    Spoke with Nereida who states [facility]([place]) can accept. Nereida aware plan is dc in AM (around 12:00) and that family will transport. Nsg will update family.  Report # [id number] and fax is [phone]

## 2024-08-24 NOTE — FLOWSHEET NOTE
08/23/24 2049   Vital Signs   Temp 97.7 °F (36.5 °C)   Temp Source Oral   Pulse 85   Heart Rate Source Monitor   Respirations 16   BP (!) 169/84   MAP (Calculated) 112   BP Location Left upper arm   BP Method Automatic   Oxygen Therapy   SpO2 97 %   O2 Device None (Room air)     Pt assessment complete.  Pt lying in bed quietly.  Lung sounds clear.  Pt NSR per monitor with HR of 85.  NG clamped at this time.  Bowel sounds active.  Pt reports that she has had a small bm.  Manley cath in place draining clear yellow urine.  Pt continues to refuse PO meds.  States she doesn't think she can swallow them with a tube in her throat.  IV fluids infusing at 75ml/hr.  Nightly medications given.  Denies needs at this time.  Call light within reach.  Bed exit alarm on.

## 2024-08-25 VITALS
TEMPERATURE: 97.4 F | HEIGHT: 65 IN | HEART RATE: 66 BPM | OXYGEN SATURATION: 98 % | BODY MASS INDEX: 21.33 KG/M2 | WEIGHT: 128 LBS | SYSTOLIC BLOOD PRESSURE: 137 MMHG | RESPIRATION RATE: 16 BRPM | DIASTOLIC BLOOD PRESSURE: 74 MMHG

## 2024-08-25 LAB
ALBUMIN SERPL-MCNC: 3.2 G/DL (ref 3.4–5)
ANION GAP SERPL CALCULATED.3IONS-SCNC: 10 MMOL/L (ref 3–16)
BUN SERPL-MCNC: 8 MG/DL (ref 7–20)
CALCIUM SERPL-MCNC: 8.5 MG/DL (ref 8.3–10.6)
CHLORIDE SERPL-SCNC: 105 MMOL/L (ref 99–110)
CO2 SERPL-SCNC: 24 MMOL/L (ref 21–32)
CREAT SERPL-MCNC: 1.1 MG/DL (ref 0.6–1.2)
GFR SERPLBLD CREATININE-BSD FMLA CKD-EPI: 50 ML/MIN/{1.73_M2}
GLUCOSE BLD-MCNC: 83 MG/DL (ref 70–99)
GLUCOSE SERPL-MCNC: 88 MG/DL (ref 70–99)
PERFORMED ON: NORMAL
PHOSPHATE SERPL-MCNC: 2.4 MG/DL (ref 2.5–4.9)
POTASSIUM SERPL-SCNC: 3.6 MMOL/L (ref 3.5–5.1)
SODIUM SERPL-SCNC: 139 MMOL/L (ref 136–145)

## 2024-08-25 PROCEDURE — 99238 HOSP IP/OBS DSCHRG MGMT 30/<: CPT | Performed by: INTERNAL MEDICINE

## 2024-08-25 PROCEDURE — 6360000002 HC RX W HCPCS: Performed by: INTERNAL MEDICINE

## 2024-08-25 PROCEDURE — 2580000003 HC RX 258: Performed by: STUDENT IN AN ORGANIZED HEALTH CARE EDUCATION/TRAINING PROGRAM

## 2024-08-25 PROCEDURE — 6370000000 HC RX 637 (ALT 250 FOR IP): Performed by: INTERNAL MEDICINE

## 2024-08-25 PROCEDURE — 36415 COLL VENOUS BLD VENIPUNCTURE: CPT

## 2024-08-25 PROCEDURE — 80069 RENAL FUNCTION PANEL: CPT

## 2024-08-25 PROCEDURE — 2580000003 HC RX 258: Performed by: INTERNAL MEDICINE

## 2024-08-25 RX ORDER — CARBOXYMETHYLCELLULOSE SODIUM 10 MG/ML
2 GEL OPHTHALMIC 3 TIMES DAILY
DISCHARGE
Start: 2024-08-25

## 2024-08-25 RX ORDER — FLUTICASONE PROPIONATE 50 MCG
1 SPRAY, SUSPENSION (ML) NASAL DAILY
DISCHARGE
Start: 2024-08-25

## 2024-08-25 RX ORDER — DIAZEPAM 5 MG
5 TABLET ORAL NIGHTLY PRN
Qty: 3 TABLET | Refills: 0 | Status: SHIPPED | OUTPATIENT
Start: 2024-08-25 | End: 2024-08-26 | Stop reason: SDUPTHER

## 2024-08-25 RX ORDER — FLUTICASONE PROPIONATE 50 MCG
1 SPRAY, SUSPENSION (ML) NASAL DAILY
Status: DISCONTINUED | OUTPATIENT
Start: 2024-08-25 | End: 2024-08-25 | Stop reason: HOSPADM

## 2024-08-25 RX ADMIN — CITALOPRAM HYDROBROMIDE 30 MG: 20 TABLET ORAL at 08:07

## 2024-08-25 RX ADMIN — POTASSIUM & SODIUM PHOSPHATES POWDER PACK 280-160-250 MG 250 MG: 280-160-250 PACK at 09:13

## 2024-08-25 RX ADMIN — CARBOXYMETHYLCELLULOSE SODIUM 2 DROP: 10 GEL OPHTHALMIC at 08:07

## 2024-08-25 RX ADMIN — BUPROPION HYDROCHLORIDE 150 MG: 150 TABLET, EXTENDED RELEASE ORAL at 08:07

## 2024-08-25 RX ADMIN — LEVOTHYROXINE SODIUM 75 MCG: 25 TABLET ORAL at 05:14

## 2024-08-25 RX ADMIN — SODIUM CHLORIDE, PRESERVATIVE FREE 10 ML: 5 INJECTION INTRAVENOUS at 08:12

## 2024-08-25 RX ADMIN — ENOXAPARIN SODIUM 40 MG: 100 INJECTION SUBCUTANEOUS at 08:07

## 2024-08-25 RX ADMIN — Medication 40 MG: at 08:07

## 2024-08-25 RX ADMIN — METOPROLOL TARTRATE 25 MG: 25 TABLET, FILM COATED ORAL at 08:07

## 2024-08-25 NOTE — PLAN OF CARE
Problem: Discharge Planning  Goal: Discharge to home or other facility with appropriate resources  Outcome: Progressing  Flowsheets (Taken 8/25/2024 0829)  Discharge to home or other facility with appropriate resources: Identify barriers to discharge with patient and caregiver     Problem: Pain  Goal: Verbalizes/displays adequate comfort level or baseline comfort level  Outcome: Progressing  Flowsheets (Taken 8/25/2024 0829)  Verbalizes/displays adequate comfort level or baseline comfort level:   Encourage patient to monitor pain and request assistance   Assess pain using appropriate pain scale     Problem: Skin/Tissue Integrity  Goal: Absence of new skin breakdown  Description: 1.  Monitor for areas of redness and/or skin breakdown  2.  Assess vascular access sites hourly  3.  Every 4-6 hours minimum:  Change oxygen saturation probe site  4.  Every 4-6 hours:  If on nasal continuous positive airway pressure, respiratory therapy assess nares and determine need for appliance change or resting period.  Outcome: Progressing

## 2024-08-25 NOTE — CARE COORDINATION
Order for dc noted. Spoke with nsg and pt already left with family who is transporting to facility. Nsg to call report  AVS faxed to [facility]. No other dc needs identified at this time

## 2024-08-25 NOTE — DISCHARGE INSTR - COC
Physical Therapy and Occupational Therapy  Weight Bearing Status/Restrictions: No weight bearing restrictions  Other Medical Equipment (for information only, NOT a DME order):  walker  Other Treatments:     Patient's personal belongings (please select all that are sent with patient):  Bag of belongings     RN SIGNATURE:  Electronically signed by Nimco Alexander RN on 8/25/24 at 10:58 AM EDT    CASE MANAGEMENT/SOCIAL WORK SECTION    Inpatient Status Date: ***    Readmission Risk Assessment Score:  Readmission Risk              Risk of Unplanned Readmission:  16           Discharging to Facility/ Agency   Name:   Address:  Phone:  Fax:    Dialysis Facility (if applicable)   Name:  Address:  Dialysis Schedule:  Phone:  Fax:    / signature: {Esignature:[id number]}    PHYSICIAN SECTION    Prognosis: Good    Condition at Discharge: Stable    Rehab Potential (if transferring to Rehab): Good    Recommended Labs or Other Treatments After Discharge:     PT/OT  Blood pressure monitoring. Outpatient records indicate well controlled on metoprolol 100s - 120 systolic. She has had highs here suspected due to sever anxiety. BPs substantially improved when home medications resumed.     Physician Certification: I certify the above information and transfer of Monalisa Mcdowell  is necessary for the continuing treatment of the diagnosis listed and that she requires Skilled Nursing Facility for less 30 days.     Update Admission H&P: No change in H&P    PHYSICIAN SIGNATURE:  Electronically signed by Iwona Moscoso DO on 8/25/24 at 9:21 AM EDT

## 2024-08-25 NOTE — DISCHARGE SUMMARY
of course of NG/OG/NE tube and location of tip of tube TECHNOLOGIST PROVIDED HISTORY: Reason for exam:->Confirmation of course of NG/OG/NE tube and location of tip of tube Portable?->Yes Reason for Exam: NG placement FINDINGS: There is an enteric tube with the tip and side-port in the stomach. Nonobstructive bowel gas pattern     Enteric tube with tip and side-port in the stomach     XR CHEST PORTABLE    Result Date: 8/20/2024  EXAMINATION: ONE XRAY VIEW OF THE CHEST 8/20/2024 4:29 am COMPARISON: None. HISTORY: ORDERING SYSTEM PROVIDED HISTORY: abdominal rocky TECHNOLOGIST PROVIDED HISTORY: Reason for exam:->abdominal rocky Reason for Exam: abd pain FINDINGS: The mediastinum is unremarkable.  There is moderate cardiomegaly.  There is a moderate hiatal hernia.  The lungs are clear.     1. No acute cardiopulmonary process. 2. Moderate cardiomegaly. 3. Moderate hiatal hernia.     XR HIP 2-3 VW W PELVIS LEFT    Result Date: 8/12/2024  History:  Pain Exam/Technique:  AP view the pelvis and AP and frog-leg views of the left hip were obtained. Comparison:  2/18/2016 Findings:  There are mild osteoarthritic changes in both hips, greater on the left.  There is no evidence for an acute osseous abnormality.  No destructive processes are seen. IMPRESSION:  Mild osteoarthritic changes.  Otherwise normal pelvis and left hip.   Workstation:[id number] Finalized by Sarbjit Sorto MD on 8/12/2024 2:46 PM        The patient was seen and examined on day of discharge and this discharge summary is in conjunction with any daily progress note from day of discharge.Time Spent on discharge is less than 30 minutes in the examination, evaluation, counseling and review of medications and discharge plan.          Signed:    Iwona Moscoso DO   8/25/2024      Thank you Duane Oneil MD for the opportunity to be involved in this patient's care. If you have any questions or concerns please feel free to contact me at [facility].

## 2024-08-25 NOTE — PLAN OF CARE
Problem: Discharge Planning  Goal: Discharge to home or other facility with appropriate resources  8/25/2024 1215 by Nimco Alexander RN  Outcome: Adequate for Discharge  8/25/2024 0829 by Nimco Alexander RN  Outcome: Progressing  Flowsheets (Taken 8/25/2024 0829)  Discharge to home or other facility with appropriate resources: Identify barriers to discharge with patient and caregiver     Problem: Pain  Goal: Verbalizes/displays adequate comfort level or baseline comfort level  8/25/2024 1215 by Nimco Alexander RN  Outcome: Adequate for Discharge  8/25/2024 0829 by Nimco Alexander RN  Outcome: Progressing  Flowsheets (Taken 8/25/2024 0829)  Verbalizes/displays adequate comfort level or baseline comfort level:   Encourage patient to monitor pain and request assistance   Assess pain using appropriate pain scale     Problem: Safety - Adult  Goal: Free from fall injury  Outcome: Adequate for Discharge     Problem: Gastrointestinal - Adult  Goal: Minimal or absence of nausea and vomiting  Outcome: Adequate for Discharge     Problem: Skin/Tissue Integrity  Goal: Absence of new skin breakdown  Description: 1.  Monitor for areas of redness and/or skin breakdown  2.  Assess vascular access sites hourly  3.  Every 4-6 hours minimum:  Change oxygen saturation probe site  4.  Every 4-6 hours:  If on nasal continuous positive airway pressure, respiratory therapy assess nares and determine need for appliance change or resting period.  8/25/2024 1215 by Nimco Alexander RN  Outcome: Adequate for Discharge  8/25/2024 0829 by Nimco Alexander RN  Outcome: Progressing

## 2024-08-25 NOTE — FLOWSHEET NOTE
08/24/24 2338   Vital Signs   Temp 97.9 °F (36.6 °C)   Temp Source Oral   Pulse 63   Heart Rate Source Monitor   Respirations 16   BP (!) 156/60   MAP (Calculated) 92   BP Location Right upper arm   BP Method Automatic   Patient Position Semi fowlers   Oxygen Therapy   SpO2 97 %   O2 Device None (Room air)     Pt reassessment complete.  No changes noted. Denies needs.  Call light within reach.

## 2024-08-25 NOTE — FLOWSHEET NOTE
08/25/24 0805   Vitals   Temp 97.4 °F (36.3 °C)   Temp Source Oral   Pulse 66   Heart Rate Source Monitor   Respirations 16   /74   MAP (Calculated) 95   BP Location Right upper arm   BP Upper/Lower Upper   BP Method Automatic   Cardiac Rhythm Sinus tachy   Oxygen Therapy   SpO2 98 %   O2 Device None (Room air)     Shift assessment completed-see flow sheet. Patient in bed awake,alert and oriented x4.  Vitals stable. HR 66,Sinus on monitor. 98% on RA,lung sounds clear. Bowel sounds active x4.   Morning medications given. Tolerated well in pudding.Patient denies any discomfort at this time.  Patient currently sitting up eating breakfast, no further needs expressed. Call light within reach.

## 2024-08-25 NOTE — FLOWSHEET NOTE
08/24/24 2003   Vital Signs   Temp 97.9 °F (36.6 °C)   Temp Source Oral   Pulse 73   Heart Rate Source Monitor   Respirations 16   BP (!) 176/90   MAP (Calculated) 119   BP Location Right upper arm   BP Method Automatic   Patient Position High fowlers     Pt assessment complete.  Pt lying in bed quietly.  Lung sounds clear.  Pt NSR per monitor with HR Of 73.  NG and busch removed on day shift. Pt reports having small BM and urinating in BSC.  Bowel sounds active.  New IV placed in right wrist.   Nightly medications given in vanilla pudding.  Denies needs.  Call light within reach.  Bed exit alarm on.

## 2024-08-25 NOTE — PROGRESS NOTES
Patient educated on discharge instructions as well as new medications use, dosage, administration and possible side effects.  Patient verified knowledge. IV removed without difficulty and dry dressing in place. Telemetry monitor removed and returned to [facility]. Pt left facility in stable condition to Rehab with all of their personal belongings.     75

## 2024-08-26 ENCOUNTER — OFFICE VISIT (OUTPATIENT)
Age: 79
End: 2024-08-26

## 2024-08-26 VITALS
SYSTOLIC BLOOD PRESSURE: 124 MMHG | TEMPERATURE: 98.8 F | OXYGEN SATURATION: 99 % | HEIGHT: 65 IN | HEART RATE: 65 BPM | DIASTOLIC BLOOD PRESSURE: 70 MMHG | BODY MASS INDEX: 21.66 KG/M2 | WEIGHT: 130 LBS

## 2024-08-26 DIAGNOSIS — K21.9 GERD WITHOUT ESOPHAGITIS: ICD-10-CM

## 2024-08-26 DIAGNOSIS — E03.3 POSTINFECTIOUS HYPOTHYROIDISM: Chronic | ICD-10-CM

## 2024-08-26 DIAGNOSIS — I48.11 LONGSTANDING PERSISTENT ATRIAL FIBRILLATION (HCC): Primary | ICD-10-CM

## 2024-08-26 DIAGNOSIS — F41.9 ANXIETY: ICD-10-CM

## 2024-08-26 DIAGNOSIS — E78.2 MIXED HYPERLIPIDEMIA: ICD-10-CM

## 2024-08-26 DIAGNOSIS — F34.1 PERSISTENT DEPRESSIVE DISORDER: ICD-10-CM

## 2024-08-26 DIAGNOSIS — I10 ESSENTIAL HYPERTENSION: ICD-10-CM

## 2024-08-26 DIAGNOSIS — M79.7 FIBROMYALGIA: ICD-10-CM

## 2024-08-26 DIAGNOSIS — K31.89 MESENTEROAXIAL GASTRIC VOLVULUS: ICD-10-CM

## 2024-08-26 RX ORDER — DIAZEPAM 5 MG
5 TABLET ORAL NIGHTLY PRN
Qty: 3 TABLET | Refills: 0 | Status: SHIPPED | OUTPATIENT
Start: 2024-08-26 | End: 2024-09-25

## 2024-08-26 RX ORDER — TRAMADOL HYDROCHLORIDE 50 MG/1
50 TABLET ORAL EVERY 8 HOURS PRN
Qty: 40 TABLET | Refills: 0 | Status: SHIPPED | OUTPATIENT
Start: 2024-08-26 | End: 2024-10-01

## 2024-08-26 NOTE — PROGRESS NOTES
[facility]  [facility]  [address]  [place] [address]     Date of Visit:  2024  Patient Name: Monalisa Mcdowell   Patient :  1945     CHIEF COMPLAINT/HPI:     Chief Complaint   Patient presents with    Follow-Up from [facility]    Medication Refill     Need medication refill on Tramadol, Valuim and need a letter that she continue medication         HPI      Monalisa Mcdowell, 79 y.o. presents today for follow up of atrial fibrillation, hypertension, hyperlipidemia, GERD, Depression, and hypothyroidism.     REVIEW OF SYSTEM      Review of Systems:   Constitutional:  Negative for chills, fatigue, fever and unexpected weight change.   Eyes:  Negative for visual disturbance.   Respiratory:  Negative for cough, chest tightness, shortness of breath and wheezing.    Cardiovascular:  Negative for chest pain, palpitations and leg swelling.   Gastrointestinal:  Negative for abdominal distention, abdominal pain, blood in stool, constipation, diarrhea, nausea and vomiting.   Genitourinary:  Negative for dysuria, hematuria and urgency.   Musculoskeletal:  Negative for back pain, neck pain and neck stiffness.   Skin:  Negative for rash and wound.   Neurological:  Negative for syncope, weakness, light-headedness and headaches.   Hematological:  Negative for adenopathy. Does not bruise/bleed easily.   Psychiatric/Behavioral:  Negative for suicidal ideas. The patient is not nervous/anxious.      REVIEWED INFORMATION      Allergies   Allergen Reactions    Codeine     Codeine Itching    Corn Oil      Other reaction(s): Unknown    Formaldehyde Hives and Other (See Comments)     Other reaction(s): Unknown    Lactase-Lactobacillus      Other reaction(s): Unknown    Mobic [Meloxicam]     Morphine      Other reaction(s): confusion    Nitrofurantoin     Sulfa Antibiotics     Sulfa Antibiotics Itching       Current Outpatient Medications   Medication Sig Note  memory loss during situations like hers is normal and expected.   She has daily pain, currently tolerable. Does not take pain meds regularly. She does take tramadol when the weather is bad.   Prescriptions sent for Tramadol and Valium.     Return in about 3 months (around 11/26/2024).  Ida HERNANDEZ, am scribing for and in the presence of Duane Oneil MD. 8/26/24/5:26 PM EDT    Duane HERNANDEZ MD, personally performed the services described in this documentation after obtaining verbal consent from the patient, and the record is both accurate and complete.

## 2024-09-17 ENCOUNTER — TELEPHONE (OUTPATIENT)
Age: 79
End: 2024-09-17

## 2024-10-16 ENCOUNTER — HOSPITAL ENCOUNTER (EMERGENCY)
Age: 79
Discharge: OTHER INSTITUTION | End: 2024-10-17
Attending: EMERGENCY MEDICINE
Payer: MEDICARE

## 2024-10-16 VITALS
HEART RATE: 75 BPM | DIASTOLIC BLOOD PRESSURE: 73 MMHG | RESPIRATION RATE: 18 BRPM | SYSTOLIC BLOOD PRESSURE: 158 MMHG | TEMPERATURE: 98.6 F | OXYGEN SATURATION: 99 %

## 2024-10-16 DIAGNOSIS — L24.9 IRRITANT CONTACT DERMATITIS, UNSPECIFIED TRIGGER: Primary | ICD-10-CM

## 2024-10-16 LAB
ANION GAP SERPL CALCULATED.3IONS-SCNC: 11 MMOL/L (ref 9–17)
BASOPHILS # BLD: 0.1 K/UL (ref 0–0.2)
BASOPHILS NFR BLD: 1 % (ref 0–2)
BUN SERPL-MCNC: 32 MG/DL (ref 8–23)
CALCIUM SERPL-MCNC: 9.1 MG/DL (ref 8.6–10.4)
CHLORIDE SERPL-SCNC: 108 MMOL/L (ref 98–107)
CO2 SERPL-SCNC: 20 MMOL/L (ref 20–31)
CREAT SERPL-MCNC: 1.4 MG/DL (ref 0.5–0.9)
EOSINOPHIL # BLD: 0.5 K/UL (ref 0–0.4)
EOSINOPHILS RELATIVE PERCENT: 4 % (ref 1–4)
ERYTHROCYTE [DISTWIDTH] IN BLOOD BY AUTOMATED COUNT: 14.2 % (ref 12.5–15.4)
GFR, ESTIMATED: 38 ML/MIN/1.73M2
GLUCOSE SERPL-MCNC: 133 MG/DL (ref 70–99)
HCT VFR BLD AUTO: 34.5 % (ref 36–46)
HGB BLD-MCNC: 11.1 G/DL (ref 12–16)
LYMPHOCYTES NFR BLD: 1.7 K/UL (ref 1–4.8)
LYMPHOCYTES RELATIVE PERCENT: 15 % (ref 24–44)
MCH RBC QN AUTO: 27.5 PG (ref 26–34)
MCHC RBC AUTO-ENTMCNC: 32.1 G/DL (ref 31–37)
MCV RBC AUTO: 85.9 FL (ref 80–100)
MONOCYTES NFR BLD: 0.7 K/UL (ref 0.1–1.2)
MONOCYTES NFR BLD: 7 % (ref 2–11)
NEUTROPHILS NFR BLD: 73 % (ref 36–66)
NEUTS SEG NFR BLD: 8.4 K/UL (ref 1.8–7.7)
PLATELET # BLD AUTO: 257 K/UL (ref 140–450)
PMV BLD AUTO: 7.5 FL (ref 6–12)
POTASSIUM SERPL-SCNC: 4.3 MMOL/L (ref 3.7–5.3)
RBC # BLD AUTO: 4.01 M/UL (ref 4–5.2)
SODIUM SERPL-SCNC: 139 MMOL/L (ref 135–144)
WBC OTHER # BLD: 11.3 K/UL (ref 3.5–11)

## 2024-10-16 PROCEDURE — 36415 COLL VENOUS BLD VENIPUNCTURE: CPT

## 2024-10-16 PROCEDURE — 99284 EMERGENCY DEPT VISIT MOD MDM: CPT

## 2024-10-16 PROCEDURE — 2580000003 HC RX 258: Performed by: PHYSICIAN ASSISTANT

## 2024-10-16 PROCEDURE — 6370000000 HC RX 637 (ALT 250 FOR IP): Performed by: PHYSICIAN ASSISTANT

## 2024-10-16 PROCEDURE — 85025 COMPLETE CBC W/AUTO DIFF WBC: CPT

## 2024-10-16 PROCEDURE — 80048 BASIC METABOLIC PNL TOTAL CA: CPT

## 2024-10-16 RX ORDER — PREDNISONE 20 MG/1
20 TABLET ORAL ONCE
Status: COMPLETED | OUTPATIENT
Start: 2024-10-16 | End: 2024-10-16

## 2024-10-16 RX ORDER — TRIAMCINOLONE ACETONIDE 0.25 MG/G
OINTMENT TOPICAL
Qty: 80 G | Refills: 1 | Status: SHIPPED | OUTPATIENT
Start: 2024-10-16 | End: 2024-10-23

## 2024-10-16 RX ORDER — TRIAMCINOLONE ACETONIDE 0.25 MG/G
OINTMENT TOPICAL
Qty: 80 G | Refills: 1 | Status: SHIPPED | OUTPATIENT
Start: 2024-10-16 | End: 2024-10-16

## 2024-10-16 RX ORDER — 0.9 % SODIUM CHLORIDE 0.9 %
1000 INTRAVENOUS SOLUTION INTRAVENOUS ONCE
Status: COMPLETED | OUTPATIENT
Start: 2024-10-16 | End: 2024-10-17

## 2024-10-16 RX ADMIN — SODIUM CHLORIDE 1000 ML: 9 INJECTION, SOLUTION INTRAVENOUS at 22:39

## 2024-10-16 RX ADMIN — PREDNISONE 20 MG: 20 TABLET ORAL at 22:41

## 2024-10-16 ASSESSMENT — PAIN - FUNCTIONAL ASSESSMENT
PAIN_FUNCTIONAL_ASSESSMENT: NONE - DENIES PAIN
PAIN_FUNCTIONAL_ASSESSMENT: NONE - DENIES PAIN

## 2024-10-17 NOTE — ED PROVIDER NOTES
Attending Supervisory Note/Shared Visit   I have personally performed a face to face diagnostic evaluation on this patient. I have reviewed the mid-level’s findings and agree.     (Please note that portions of this note were completed with a voice recognition program.  Efforts were made to edit the dictations but occasionally words are mis-transcribed.)    Luiz Moore MD  Attending Emergency Physician        Luiz Moore MD  10/16/24 2329

## 2024-10-17 NOTE — DISCHARGE INSTRUCTIONS
Your labs today did not show any signs of infection.    Your rash resembles a contact dermatitis which is usually from something that was applied to the skin.    You are given a small dose of oral steroids here, a steroid cream was sent to the pharmacy for you please use as directed.    You are also found to have a little bit of increased in your kidney function.  For this you were given IV fluids.  Be sure to follow-up with your family doctor to have this rechecked

## 2024-10-17 NOTE — ED PROVIDER NOTES
[facility] EMERGENCY DEPARTMENT  eMERGENCY dEPARTMENT eNCOUnter      Pt Name: Monalisa Mcdowell  MRN: [id number]  Birthdate 1945  Date of evaluation: 10/16/2024  Provider: Elmo Falk PA-C    CHIEF COMPLAINT       Chief Complaint   Patient presents with    Skin Problem     Blotchy, red, rash across abdomen.  Pt complains of itch.  Onset about 5 days ago.  Pt is 1 month post hernia repair surgery.  Pt started on Doxycycline on 10/11/2024           HISTORY OF PRESENT ILLNESS  (Location/Symptom, Timing/Onset, Context/Setting, Quality, Duration, Modifying Factors, Severity.)   Monalisa Mcdowell is a 79 y.o. female who presents to the emergency department c/o rash to abdomen. States rash started 1 week ago.  Patient states about a month ago she had laparoscopic surgery for hernia repair.  This was done at [facility].  She states that she developed a rash, she was placed on doxycycline, states that the rash got worse and presents for evaluation. Denies any trouble breathing or swallowing. Denies any abd pain, cp, sob, n/v/d/c. Denies any fevers.      Quality: itchy  Duration: constant  Modifying Factors: none  Severity: mild    Nursing Notes were reviewed.    REVIEW OF SYSTEMS    (2-9 systems for level 4, 10 or more for level 5)     Review of Systems   C/o rash  Denies trouble breathing  Denies cp  Denies fevers.     Except as noted above the remainder of the review of systems was reviewed and negative.       PAST MEDICAL HISTORY     Past Medical History:   Diagnosis Date    Acid reflux     Hyperlipidemia     Hypertension     Thyroid disease      None otherwise stated in nurses notes    SURGICAL HISTORY       Past Surgical History:   Procedure Laterality Date    APPENDECTOMY      CHOLECYSTECTOMY      COLONOSCOPY      CYSTOSCOPY      DILATION AND CURETTAGE OF UTERUS      OVARY REMOVAL      TONSILLECTOMY      URETHRAL SLING       None otherwise stated in nurses notes    CURRENT MEDICATIONS    trigger          DISPOSITION/PLAN   DISPOSITION Decision To Discharge    PATIENT REFERRED TO:  Duane Oneil MD  [address]  [place]  [facility] [place] [address]  [phone]    In 2 days      [facility] Emergency Department  [address]  [facility] [id number] [phone]    For worsening symptoms, or any other concern      DISCHARGE MEDICATIONS:  New Prescriptions    TRIAMCINOLONE (KENALOG) 0.025 % OINTMENT    Apply topically 2 times daily.       (Please note that portions of this note were completed with a voice recognition program.  Efforts were made to edit the dictations but occasionally words are mis-transcribed.)    MADONNA Gonzalez Yusef A, PA-C  10/16/24 2132       Elmo Falk PA-C  10/16/24 2307

## 2024-10-30 ENCOUNTER — TELEPHONE (OUTPATIENT)
Age: 79
End: 2024-10-30

## 2024-10-31 RX ORDER — LEVOTHYROXINE SODIUM 75 UG/1
TABLET ORAL
Qty: 90 TABLET | Refills: 3 | Status: SHIPPED | OUTPATIENT
Start: 2024-10-31

## 2024-10-31 NOTE — TELEPHONE ENCOUNTER
Monalisa Mcdowell is calling to request a refill on the following medication(s):    Medication Request:  Requested Prescriptions     Pending Prescriptions Disp Refills    levothyroxine (SYNTHROID) 75 MCG tablet [Pharmacy Med Name: LEVOTHYROXINE 75 MCG TABLET] 90 tablet 3     Sig: TAKE ONE TABLET BY MOUTH EVERY MORNING ON AN EMPTY STOMACH       Last Visit Date (If Applicable):  8/26/2024    Next Visit Date:    Visit date not found

## 2024-11-01 NOTE — TELEPHONE ENCOUNTER
Tell patient to keep 11/6/24 appt  Get copy of mallorie MONTES MAR into our records and any labs last 2 weeks

## 2024-11-01 NOTE — TELEPHONE ENCOUNTER
Patient did not have an appt that day. I said that is the NEXT day of an opening for you. I will call her and get her scheduled

## 2024-11-01 NOTE — TELEPHONE ENCOUNTER
Patient is coming in on Wednesday at 4pm. She will bring her med list along with testing as well.

## 2024-11-06 ENCOUNTER — OFFICE VISIT (OUTPATIENT)
Age: 79
End: 2024-11-06

## 2024-11-06 VITALS
WEIGHT: 123.2 LBS | TEMPERATURE: 97.7 F | BODY MASS INDEX: 20.53 KG/M2 | HEIGHT: 65 IN | SYSTOLIC BLOOD PRESSURE: 110 MMHG | HEART RATE: 76 BPM | DIASTOLIC BLOOD PRESSURE: 72 MMHG | OXYGEN SATURATION: 97 %

## 2024-11-06 DIAGNOSIS — G89.18 POSTOPERATIVE PAIN: ICD-10-CM

## 2024-11-06 DIAGNOSIS — K31.89 MESENTEROAXIAL GASTRIC VOLVULUS: Primary | ICD-10-CM

## 2024-11-06 DIAGNOSIS — Z09 HOSPITAL DISCHARGE FOLLOW-UP: ICD-10-CM

## 2024-11-06 DIAGNOSIS — I48.11 LONGSTANDING PERSISTENT ATRIAL FIBRILLATION (HCC): ICD-10-CM

## 2024-11-06 DIAGNOSIS — D50.0 IRON DEFICIENCY ANEMIA DUE TO CHRONIC BLOOD LOSS: ICD-10-CM

## 2024-11-06 DIAGNOSIS — Z23 NEED FOR INFLUENZA VACCINATION: ICD-10-CM

## 2024-11-06 DIAGNOSIS — E03.3 POSTINFECTIOUS HYPOTHYROIDISM: Chronic | ICD-10-CM

## 2024-11-06 DIAGNOSIS — I10 HYPERTENSION, ESSENTIAL: ICD-10-CM

## 2024-11-06 DIAGNOSIS — M79.7 FIBROMYALGIA: ICD-10-CM

## 2024-11-06 RX ORDER — CALCIPOTRIENE 50 UG/G
OINTMENT TOPICAL EVERY MORNING
COMMUNITY

## 2024-11-06 RX ORDER — FERROUS SULFATE 325(65) MG
325 TABLET ORAL
Qty: 30 TABLET | Refills: 5 | Status: SHIPPED | OUTPATIENT
Start: 2024-11-06

## 2024-11-06 RX ORDER — HYDROCODONE BITARTRATE AND ACETAMINOPHEN 5; 325 MG/1; MG/1
0.5 TABLET ORAL EVERY 6 HOURS PRN
COMMUNITY
End: 2024-11-06 | Stop reason: SDUPTHER

## 2024-11-06 RX ORDER — CIPROFLOXACIN 500 MG/1
500 TABLET, FILM COATED ORAL 2 TIMES DAILY
COMMUNITY

## 2024-11-06 RX ORDER — CARVEDILOL 6.25 MG/1
3.12 TABLET ORAL 2 TIMES DAILY
Qty: 60 TABLET | Refills: 3 | Status: SHIPPED | OUTPATIENT
Start: 2024-11-06

## 2024-11-06 RX ORDER — LEVOTHYROXINE SODIUM 88 UG/1
88 TABLET ORAL EVERY MORNING
COMMUNITY

## 2024-11-06 RX ORDER — CLOBETASOL PROPIONATE 0.5 MG/G
CREAM TOPICAL DAILY
COMMUNITY

## 2024-11-06 RX ORDER — ACETAMINOPHEN 325 MG/1
650 TABLET ORAL EVERY 4 HOURS PRN
COMMUNITY

## 2024-11-06 RX ORDER — FERROUS SULFATE 325(65) MG
325 TABLET ORAL
COMMUNITY
End: 2024-11-06 | Stop reason: SDUPTHER

## 2024-11-06 RX ORDER — HYDROCODONE BITARTRATE AND ACETAMINOPHEN 5; 325 MG/1; MG/1
0.5 TABLET ORAL EVERY 6 HOURS PRN
Qty: 60 TABLET | Refills: 0 | Status: SHIPPED | OUTPATIENT
Start: 2024-11-06 | End: 2024-12-06

## 2024-11-06 RX ORDER — CARVEDILOL 6.25 MG/1
3.12 TABLET ORAL 2 TIMES DAILY
COMMUNITY
Start: 2024-09-19 | End: 2024-11-06 | Stop reason: SDUPTHER

## 2024-11-06 RX ORDER — MIRTAZAPINE 15 MG/1
15 TABLET, FILM COATED ORAL NIGHTLY
COMMUNITY

## 2024-11-06 SDOH — ECONOMIC STABILITY: FOOD INSECURITY: WITHIN THE PAST 12 MONTHS, YOU WORRIED THAT YOUR FOOD WOULD RUN OUT BEFORE YOU GOT MONEY TO BUY MORE.: NEVER TRUE

## 2024-11-06 SDOH — ECONOMIC STABILITY: INCOME INSECURITY: HOW HARD IS IT FOR YOU TO PAY FOR THE VERY BASICS LIKE FOOD, HOUSING, MEDICAL CARE, AND HEATING?: NOT HARD AT ALL

## 2024-11-06 SDOH — ECONOMIC STABILITY: FOOD INSECURITY: WITHIN THE PAST 12 MONTHS, THE FOOD YOU BOUGHT JUST DIDN'T LAST AND YOU DIDN'T HAVE MONEY TO GET MORE.: NEVER TRUE

## 2024-11-06 NOTE — PATIENT INSTRUCTIONS
Monalisa    Thank you for choosing [facility].  We know you have options when it comes to your healthcare; we appreciate that you chose us. Our goal is to provide exceptional  service and world class care to every patient.  You will be receiving a survey via email or text message asking for your feedback.  Please take a few minutes to share your thoughts about your recent visit. Your comments help us understand what we do well and ways we can improve.  Thank you in advance for your valuable feedback.      Dr. MARGARITA Chatterjee

## 2024-11-06 NOTE — PROGRESS NOTES
Vaccine Information Sheet, \"Influenza - Inactivated\"  given to Monalisa Mcdowell, or parent/legal guardian of  Monalisa Mcdowell and verbalized understanding.    Patient responses:    Have you ever had a reaction to a flu vaccine? No  Are you able to eat eggs without adverse effects?  Yes  Do you have any current illness?  No  Have you ever had Guillian [place] Syndrome?  No    Flu vaccine given per order. Please see immunization tab.    Left Deltoid  VIS was given            
CHOLECYSTECTOMY      COLONOSCOPY      CYSTOSCOPY      DILATION AND CURETTAGE OF UTERUS      OVARY REMOVAL      TONSILLECTOMY      URETHRAL SLING          Social History     Socioeconomic History    Marital status:      Spouse name: None    Number of children: None    Years of education: None    Highest education level: None   Tobacco Use    Smoking status: Never    Smokeless tobacco: Never   Substance and Sexual Activity    Alcohol use: Not Currently   Social History Narrative    ** Merged History Encounter **          Social Determinants of Health     Financial Resource Strain: Low Risk  (11/6/2024)    Overall Financial Resource Strain (CARDIA)     Difficulty of Paying Living Expenses: Not hard at all   Food Insecurity: No Food Insecurity (11/6/2024)    Hunger Vital Sign     Worried About Running Out of Food in the Last Year: Never true     Ran Out of Food in the Last Year: Never true   Transportation Needs: No Transportation Needs (11/6/2024)    PRAPARE - Transportation     Lack of Transportation (Medical): No     Lack of Transportation (Non-Medical): No    [facility]    [place]   Housing Stability: Low Risk  (11/6/2024)    Housing Stability Vital Sign     Unable to Pay for Housing in the Last Year: No     Number of Times Moved in the Last Year: 1     Homeless in the Last Year: No        PHYSICAL EXAM     /72   Pulse 76   Temp 97.7 °F (36.5 °C)   Ht 1.651 m (5' 5\")   Wt 55.9 kg (123 lb 3.2 oz)   SpO2 97%   BMI 20.50 kg/m²      General:A & O x3, No acute distress  HEENT:  NC/AT, PERRL, EOMI, TM clear bilaterally, no pharyngeal erythema, no mass palpated on neck exam  Lymph Nodes:  There is no lymphadenopathy in the cervical, supraclavicular, infraclavicular  Heart: S1+S2, no murmurs, RRR, no carotid bruits  Lungs: clear to auscultation without wheezes or rales  Abdomen: soft, nontender, no guarding, no rebound, no masses, or

## 2024-11-08 ENCOUNTER — TELEPHONE (OUTPATIENT)
Age: 79
End: 2024-11-08

## 2024-11-08 NOTE — TELEPHONE ENCOUNTER
PT home from [place] and upon looking at her med list  Wellbutrin, metoprolol not on list, they are on our list during her office visit on 11/6/24. Also what dosage should the  levothyroxine be there is a 75 and 88mcg dosage listed. Should she be taking everything on her current med list?

## 2024-11-08 NOTE — TELEPHONE ENCOUNTER
Patient is very stressed and painful was recently taking diazepam 5mg from a different provider and is asking if you would be willing to refill it. As she feels as she is not doing well. Sveta in [place]

## 2024-11-08 NOTE — TELEPHONE ENCOUNTER
[facility] Nurse Msg Pool  Cc: Fernanda Ruelas, PHD  Nancy Jason is scheduled with Dr Ruelas 4/10/23 for Neuropsych interview. Currently, he has an active approved authorization on file with insurance for [facility] for the same services (Neuropsych). Do you know if patient has completed neuropsych testing at [facility] or if they are still planning on going to that facility? I am unable to get authorization under Dr Ruelas at [facility] since this authorization is approved for [facility].     If patient/family would rather have Neuropsych interview/testing done at [facility] with Dr Ruelas, they will need to contact [facility] to have the authorization withdrawn (that is not something I am able to do). Once the request from [facility] has been withdrawn with insurance, then insurance would be able to review for authorization with Dr Ruelas.       Left message for mom to call back.    Patient notified and verbalized understanding

## 2024-11-09 NOTE — TELEPHONE ENCOUNTER
Kaushal Cosby is 8 year old 8 month old, here for a preventive care visit.    Assessment & Plan   (Z00.129) Encounter for routine child health examination w/o abnormal findings  (primary encounter diagnosis)  Comment: doing well no problems. Normal development  Plan: BEHAVIORAL/EMOTIONAL ASSESSMENT (36334),         SCREENING TEST, PURE TONE, AIR ONLY, CBC with         platelets and differential, Comprehensive         metabolic panel (BMP + Alb, Alk Phos, ALT, AST,        Total. Bili, TP), UA with Microscopic reflex to        Culture - [place]      Growth        Normal height and weight    No weight concerns.    Immunizations     Vaccines up to date.      Anticipatory Guidance    Reviewed age appropriate anticipatory guidance.   The following topics were discussed:  SOCIAL/ FAMILY:    Praise for positive activities    Encourage reading    Social media    Limit / supervise TV/ media    Limits and consequences  NUTRITION:    Healthy snacks    Family meals    Calcium and iron sources    Balanced diet  HEALTH/ SAFETY:    Physical activity    Regular dental care    Sleep issues    Smoking exposure    Booster seat/ Seat belts    Sunscreen/ insect repellent    Firearms        Referrals/Ongoing Specialty Care  Verbal referral for routine dental care    Follow Up      Return in 1 year (on 7/12/2023) for Preventive Care visit.    Subjective     Additional Questions 7/12/2022   Do you have any questions today that you would like to discuss? Yes   Questions 1.  constipation questions-  has tried gummy fiber, metamucil and gummy probiotics 2.  wants to discuss history of occasional urinary incontinence   Has your child had a surgery, major illness or injury since the last physical exam? No             Social 7/6/2022   Who does your child live with? Parent(s), Sibling(s), Add household   Who lives in household 2? Parent(s), Step Parent(s), Sibling(s)   Has your child experienced any stressful family events recently? None   In  Wellbutrin for depression, probably best to restart  Metoprolol for BP, BP ok in office, nor reason to restart  I told him to use levothyroxine dose they had at home (I think they had 75) and I would decide if change needed based on lab slip I gave them at appt.    LET ME KNOW IF SCRIPS NEEDED   the last 12 months, was there a time when you were not able to pay the mortgage or rent on time? No   In the last 12 months, was there a time when you did not have a steady place to sleep or slept in a shelter (including now)? No       Health Risks/Safety 7/6/2022   What type of car seat does your child use? Seat belt only   Where does your child sit in the car?  Back seat   Do you have guns/firearms in the home? No       TB Screening 7/6/2022   Was your child born outside of the [place]? No     TB Screening 7/6/2022   Since your last Well Child visit, have any of your child's family members or close contacts had tuberculosis or a positive tuberculosis test? No   Since your last Well Child Visit, has your child or any of their family members or close contacts traveled or lived outside of the [place]? No   Since your last Well Child visit, has your child lived in a high-risk group setting like a correctional facility, health care facility, homeless shelter, or refugee camp? No        Dyslipidemia Screening 7/6/2022   Have any of the child's parents or grandparents had a stroke or heart attack before age 55 for males or before age 65 for females?  (!) YES   Do either of the child's parents have high cholesterol or are currently taking medications to treat cholesterol? (!) YES    Risk Factors: None      Dental Screening 7/6/2022   Has your child seen a dentist? Yes   When was the last visit? Within the last 3 months   Has your child had cavities in the last 3 years? No   Has your child s parent(s), caregiver, or sibling(s) had any cavities in the last 2 years?  No     No, parent/guardian declines fluoride varnish.  Reason for decline: Recent/Upcoming dental appointment  Diet 7/6/2022   Do you have questions about feeding your child? No   What does your child regularly drink? (!) JUICE   How often does your family eat meals together? Every day   How many snacks does your child eat per day 2   Are there types  of foods your child won't eat? (!) YES   Please specify: Some meats   Does your child get at least 3 servings of food or beverages that have calcium each day (dairy, green leafy vegetables, etc)? Yes   Within the past 12 months, you worried that your food would run out before you got money to buy more. (!) DECLINE   Within the past 12 months, the food you bought just didn't last and you didn't have money to get more. (!) DECLINE     Elimination 7/6/2022   Do you have any concerns about your child's bladder or bowels? (!) CONSTIPATION (HARD OR INFREQUENT POOP)         Activity 7/6/2022   On average, how many days per week does your child engage in moderate to strenuous exercise (like walking fast, running, jogging, dancing, swimming, biking, or other activities that cause a light or heavy sweat)? (!) 3 DAYS   On average, how many minutes does your child engage in exercise at this level? (!) 30 MINUTES   What does your child do for exercise?  Riding a bike. Trampoline   What activities is your child involved with?  None     Media Use 7/6/2022   How many hours per day is your child viewing a screen for entertainment?    2   Does your child use a screen in their bedroom? (!) YES     Sleep 7/6/2022   Do you have any concerns about your child's sleep?  (!) BEDWETTING       Vision/Hearing 7/6/2022   Do you have any concerns about your child's hearing or vision?  No concerns     Vision Screen  Vision Screen Details  Reason Vision Screen Not Completed: Patient has seen eye doctor in the past 12 months    Hearing Screen  RIGHT EAR  1000 Hz on Level 40 dB (Conditioning sound): Pass  1000 Hz on Level 20 dB: Pass  2000 Hz on Level 20 dB: Pass  4000 Hz on Level 20 dB: Pass  LEFT EAR  4000 Hz on Level 20 dB: Pass  2000 Hz on Level 20 dB: Pass  1000 Hz on Level 20 dB: Pass  500 Hz on Level 25 dB: Pass  RIGHT EAR  500 Hz on Level 25 dB: Pass  Results  Hearing Screen Results: Pass      School 7/6/2022   Do you have any concerns about  "your child's learning in school? No concerns   What grade is your child in school? 5th Grade   What school does your child attend? [employer]   Does your child typically miss more than 2 days of school per month? No   Do you have concerns about your child's friendships or peer relationships?  No     Development / Social-Emotional Screen 7/6/2022   Does your child receive any special educational services? No     Mental Health - PSC-17 required for C&TC  Screening:    PSC-17 PASS (<15 pass), no follow up necessary    No concerns        General:  normal energy and appetite.  Skin:  no rash, hives, other lesions.  Eyes:  no pain, discharge, redness, itching.  ENT:  no earache, sneezing, nasal congestion, sinus pain.  Respiratory:  no cough, wheeze, respiratory distress.  Cardiovascular:  no tachycardia, palpitations, syncope.  Gastrointestinal:  no nausea, vomiting, diarrhea, constipation, abdominal pain.  Musculoskeletal:  no myalgia or arthralgia.  Urinary: incontinence with delay with activity       Objective     Exam  /60 (BP Location: Right arm, Patient Position: Chair, Cuff Size: Child)   Pulse 82   Temp 98.3  F (36.8  C) (Tympanic)   Resp 20   Ht 1.353 m (4' 5.25\")   Wt 29.5 kg (65 lb)   SpO2 98%   BMI 16.12 kg/m    16 %ile (Z= -0.99) based on CDC (Boys, 2-20 Years) Stature-for-age data based on Stature recorded on 7/12/2022.  17 %ile (Z= -0.94) based on CDC (Boys, 2-20 Years) weight-for-age data using vitals from 7/12/2022.  32 %ile (Z= -0.46) based on CDC (Boys, 2-20 Years) BMI-for-age based on BMI available as of 7/12/2022.  Blood pressure percentiles are 57 % systolic and 49 % diastolic based on the 2017 AAP Clinical Practice Guideline. This reading is in the normal blood pressure range.  Physical Exam  GENERAL: Active, alert, in no acute distress.  SKIN: Clear. No significant rash, abnormal pigmentation or lesions  HEAD: Normocephalic  EYES: Pupils equal, round, reactive, Extraocular muscles " intact. Normal conjunctivae.  EARS: Normal canals. Tympanic membranes are normal; gray and translucent.  NOSE: Normal without discharge.  MOUTH/THROAT: Clear. No oral lesions. Teeth without obvious abnormalities.  NECK: Supple, no masses.  No thyromegaly.  LYMPH NODES: No adenopathy  LUNGS: Clear. No rales, rhonchi, wheezing or retractions  HEART: Regular rhythm. Normal S1/S2. No murmurs. Normal pulses.  ABDOMEN: Soft, non-tender, not distended, no masses or hepatosplenomegaly. Bowel sounds normal.   NEUROLOGIC: No focal findings. Cranial nerves grossly intact: DTR's normal. Normal gait, strength and tone  BACK: Spine is straight, no scoliosis.  EXTREMITIES: Full range of motion, no deformities  : Normal male external genitalia. Adam stage 2,  both testes descended, no hernia.       No Marfan stigmata: kyphoscoliosis, high-arched palate, pectus excavatuM, arachnodactyly, arm span > height, hyperlaxity, myopia, MVP, aortic insufficieny)  Eyes: normal fundoscopic and pupils  Cardiovascular: normal PMI, simultaneous femoral/radial pulses, no murmurs (standing, supine, Valsalva)  Skin: no HSV, MRSA, tinea corporis  Musculoskeletal    Neck: normal    Back: normal    Shoulder/arm: normal    Elbow/forearm: normal    Wrist/hand/fingers: normal    Hip/thigh: normal    Knee: normal    Leg/ankle: normal    Foot/toes: normal    Functional (Single Leg Hop or Squat): normal          Freedom Cabrales MD  [facility]

## 2024-11-11 NOTE — TELEPHONE ENCOUNTER
Patient received Dr SHAIKH message and understood.      She is requesting something for her appetite. Asking for Remeron    Send to Sveta Thomas

## 2024-12-17 ENCOUNTER — HOSPITAL ENCOUNTER (OUTPATIENT)
Age: 79
Setting detail: SPECIMEN
Discharge: HOME | End: 2024-12-17

## 2024-12-17 DIAGNOSIS — I10 HYPERTENSION, ESSENTIAL: ICD-10-CM

## 2024-12-17 DIAGNOSIS — E03.3 POSTINFECTIOUS HYPOTHYROIDISM: Chronic | ICD-10-CM

## 2024-12-17 LAB
ALBUMIN SERPL-MCNC: 4.3 G/DL (ref 3.5–5.2)
ALBUMIN/GLOB SERPL: 1.5 {RATIO} (ref 1–2.5)
ALP SERPL-CCNC: 79 U/L (ref 35–104)
ALT SERPL-CCNC: 5 U/L (ref 10–35)
ANION GAP SERPL CALCULATED.3IONS-SCNC: 12 MMOL/L (ref 9–16)
AST SERPL-CCNC: 16 U/L (ref 10–35)
BASOPHILS # BLD: 0.15 K/UL (ref 0–0.2)
BASOPHILS NFR BLD: 2 % (ref 0–2)
BILIRUB SERPL-MCNC: 0.3 MG/DL (ref 0–1.2)
BUN SERPL-MCNC: 22 MG/DL (ref 8–23)
CALCIUM SERPL-MCNC: 9.9 MG/DL (ref 8.6–10.4)
CHLORIDE SERPL-SCNC: 108 MMOL/L (ref 98–107)
CO2 SERPL-SCNC: 19 MMOL/L (ref 20–31)
CREAT SERPL-MCNC: 1.6 MG/DL (ref 0.6–0.9)
EOSINOPHIL # BLD: 0.31 K/UL (ref 0–0.44)
EOSINOPHILS RELATIVE PERCENT: 4 % (ref 1–4)
ERYTHROCYTE [DISTWIDTH] IN BLOOD BY AUTOMATED COUNT: 13.9 % (ref 11.8–14.4)
GFR, ESTIMATED: 32 ML/MIN/1.73M2
GLUCOSE SERPL-MCNC: 109 MG/DL (ref 74–99)
HCT VFR BLD AUTO: 39.9 % (ref 36.3–47.1)
HGB BLD-MCNC: 12.5 G/DL (ref 11.9–15.1)
IMM GRANULOCYTES # BLD AUTO: 0.03 K/UL (ref 0–0.3)
IMM GRANULOCYTES NFR BLD: 0 %
LYMPHOCYTES NFR BLD: 3.49 K/UL (ref 1.1–3.7)
LYMPHOCYTES RELATIVE PERCENT: 40 % (ref 24–43)
MCH RBC QN AUTO: 27.1 PG (ref 25.2–33.5)
MCHC RBC AUTO-ENTMCNC: 31.3 G/DL (ref 28.4–34.8)
MCV RBC AUTO: 86.4 FL (ref 82.6–102.9)
MONOCYTES NFR BLD: 0.65 K/UL (ref 0.1–1.2)
MONOCYTES NFR BLD: 8 % (ref 3–12)
NEUTROPHILS NFR BLD: 46 % (ref 36–65)
NEUTS SEG NFR BLD: 4.06 K/UL (ref 1.5–8.1)
NRBC BLD-RTO: 0 PER 100 WBC
PLATELET # BLD AUTO: 282 K/UL (ref 138–453)
PMV BLD AUTO: 10.2 FL (ref 8.1–13.5)
POTASSIUM SERPL-SCNC: 3.4 MMOL/L (ref 3.7–5.3)
PROT SERPL-MCNC: 7.1 G/DL (ref 6.6–8.7)
RBC # BLD AUTO: 4.62 M/UL (ref 3.95–5.11)
SODIUM SERPL-SCNC: 139 MMOL/L (ref 136–145)
T4 FREE SERPL-MCNC: 1.4 NG/DL (ref 0.92–1.68)
TSH SERPL DL<=0.05 MIU/L-ACNC: 5.51 UIU/ML (ref 0.27–4.2)
WBC OTHER # BLD: 8.7 K/UL (ref 3.5–11.3)

## 2024-12-23 ENCOUNTER — TELEPHONE (OUTPATIENT)
Age: 79
End: 2024-12-23

## 2024-12-23 NOTE — TELEPHONE ENCOUNTER
Patient is scheduled to have echo and nuclear stress test on 12/26    Was advised to ask PCP about her beta blocker  That's all they said - son is wondering if she is to hold it?    Please advise

## 2024-12-24 ENCOUNTER — TELEPHONE (OUTPATIENT)
Age: 79
End: 2024-12-24

## 2024-12-24 NOTE — TELEPHONE ENCOUNTER
Message left, told son currently we usually do not hold beta-blockers before stress test  If he needs further clarification, contact whoever is doing stress test  Okay to file

## 2024-12-26 ENCOUNTER — HOSPITAL ENCOUNTER (OUTPATIENT)
Age: 79
Discharge: HOME | End: 2024-12-28
Payer: MEDICARE

## 2024-12-26 ENCOUNTER — HOSPITAL ENCOUNTER (OUTPATIENT)
Dept: NUCLEAR MEDICINE | Age: 79
Discharge: HOME | End: 2024-12-28
Payer: MEDICARE

## 2024-12-26 VITALS — DIASTOLIC BLOOD PRESSURE: 80 MMHG | SYSTOLIC BLOOD PRESSURE: 169 MMHG | HEART RATE: 81 BPM

## 2024-12-26 VITALS — BODY MASS INDEX: 20.49 KG/M2 | WEIGHT: 123 LBS | HEIGHT: 65 IN

## 2024-12-26 DIAGNOSIS — R06.09 DOE (DYSPNEA ON EXERTION): ICD-10-CM

## 2024-12-26 LAB
ECHO AO ASC DIAM: 3.6 CM
ECHO AO ASCENDING AORTA INDEX: 2.24 CM/M2
ECHO AO ROOT DIAM: 2.9 CM
ECHO AO ROOT INDEX: 1.8 CM/M2
ECHO AR MAX VEL PISA: 4.8 M/S
ECHO AV AREA PEAK VELOCITY: 2.2 CM2
ECHO AV AREA VTI: 2.3 CM2
ECHO AV AREA/BSA PEAK VELOCITY: 1.4 CM2/M2
ECHO AV AREA/BSA VTI: 1.4 CM2/M2
ECHO AV MEAN GRADIENT: 3 MMHG
ECHO AV MEAN VELOCITY: 0.9 M/S
ECHO AV PEAK GRADIENT: 6 MMHG
ECHO AV PEAK VELOCITY: 1.2 M/S
ECHO AV REGURGITANT PHT: 614 MS
ECHO AV VELOCITY RATIO: 0.67
ECHO AV VTI: 26.8 CM
ECHO BSA: 1.6 M2
ECHO BSA: 1.6 M2
ECHO EST RA PRESSURE: 3 MMHG
ECHO IVC EXP: 1.6 CM
ECHO IVC INSP: 0.6 CM
ECHO LA AREA 2C: 18.1 CM2
ECHO LA AREA 4C: 15.8 CM2
ECHO LA DIAMETER INDEX: 2.36 CM/M2
ECHO LA DIAMETER: 3.8 CM
ECHO LA MAJOR AXIS: 4.7 CM
ECHO LA MINOR AXIS: 5.4 CM
ECHO LA TO AORTIC ROOT RATIO: 1.31
ECHO LA VOL BP: 48 ML (ref 22–52)
ECHO LA VOL MOD A2C: 50 ML (ref 22–52)
ECHO LA VOL MOD A4C: 40 ML (ref 22–52)
ECHO LA VOL/BSA BIPLANE: 30 ML/M2 (ref 16–34)
ECHO LA VOLUME INDEX MOD A2C: 31 ML/M2 (ref 16–34)
ECHO LA VOLUME INDEX MOD A4C: 25 ML/M2 (ref 16–34)
ECHO LV E' LATERAL VELOCITY: 4.24 CM/S
ECHO LV E' SEPTAL VELOCITY: 3.37 CM/S
ECHO LV EF PHYSICIAN: 60 %
ECHO LV FRACTIONAL SHORTENING: 26 % (ref 28–44)
ECHO LV INTERNAL DIMENSION DIASTOLE INDEX: 2.11 CM/M2
ECHO LV INTERNAL DIMENSION DIASTOLIC: 3.4 CM (ref 3.9–5.3)
ECHO LV INTERNAL DIMENSION SYSTOLIC INDEX: 1.55 CM/M2
ECHO LV INTERNAL DIMENSION SYSTOLIC: 2.5 CM
ECHO LV IVSD: 1 CM (ref 0.6–0.9)
ECHO LV MASS 2D: 98.9 G (ref 67–162)
ECHO LV MASS INDEX 2D: 61.4 G/M2 (ref 43–95)
ECHO LV POSTERIOR WALL DIASTOLIC: 1 CM (ref 0.6–0.9)
ECHO LV RELATIVE WALL THICKNESS RATIO: 0.59
ECHO LVOT AREA: 3.1 CM2
ECHO LVOT AV VTI INDEX: 0.74
ECHO LVOT DIAM: 2 CM
ECHO LVOT MEAN GRADIENT: 2 MMHG
ECHO LVOT PEAK GRADIENT: 3 MMHG
ECHO LVOT PEAK VELOCITY: 0.8 M/S
ECHO LVOT STROKE VOLUME INDEX: 38.4 ML/M2
ECHO LVOT SV: 61.9 ML
ECHO LVOT VTI: 19.7 CM
ECHO MV A VELOCITY: 0.78 M/S
ECHO MV E DECELERATION TIME (DT): 204 MS
ECHO MV E VELOCITY: 0.5 M/S
ECHO MV E/A RATIO: 0.64
ECHO MV E/E' LATERAL: 11.79
ECHO MV E/E' RATIO (AVERAGED): 13.31
ECHO MV E/E' SEPTAL: 14.84
ECHO RIGHT VENTRICULAR SYSTOLIC PRESSURE (RVSP): 35 MMHG
ECHO RV BASAL DIMENSION: 3.1 CM
ECHO RV FREE WALL PEAK S': 11.2 CM/S
ECHO TV REGURGITANT MAX VELOCITY: 2.81 M/S
ECHO TV REGURGITANT PEAK GRADIENT: 32 MMHG
NUC STRESS EJECTION FRACTION: 70 %
STRESS BASELINE DIAS BP: 92 MMHG
STRESS BASELINE HR: 70 BPM
STRESS BASELINE SYS BP: 200 MMHG
STRESS ESTIMATED WORKLOAD: 1 METS
STRESS PEAK DIAS BP: 92 MMHG
STRESS PEAK SYS BP: 200 MMHG
STRESS PERCENT HR ACHIEVED: 63 %
STRESS POST PEAK HR: 88 BPM
STRESS RATE PRESSURE PRODUCT: NORMAL BPM*MMHG
STRESS TARGET HR: 139 BPM
TID: 1.11

## 2024-12-26 PROCEDURE — 78452 HT MUSCLE IMAGE SPECT MULT: CPT

## 2024-12-26 PROCEDURE — 2500000003 HC RX 250 WO HCPCS: Performed by: PHYSICIAN ASSISTANT

## 2024-12-26 PROCEDURE — 93017 CV STRESS TEST TRACING ONLY: CPT

## 2024-12-26 PROCEDURE — 6360000002 HC RX W HCPCS: Performed by: PHYSICIAN ASSISTANT

## 2024-12-26 PROCEDURE — A9500 TC99M SESTAMIBI: HCPCS | Performed by: PHYSICIAN ASSISTANT

## 2024-12-26 PROCEDURE — 3430000000 HC RX DIAGNOSTIC RADIOPHARMACEUTICAL: Performed by: PHYSICIAN ASSISTANT

## 2024-12-26 PROCEDURE — 93306 TTE W/DOPPLER COMPLETE: CPT

## 2024-12-26 PROCEDURE — 93306 TTE W/DOPPLER COMPLETE: CPT | Performed by: INTERNAL MEDICINE

## 2024-12-26 RX ORDER — TETRAKIS(2-METHOXYISOBUTYLISOCYANIDE)COPPER(I) TETRAFLUOROBORATE 1 MG/ML
12 INJECTION, POWDER, LYOPHILIZED, FOR SOLUTION INTRAVENOUS
Status: COMPLETED | OUTPATIENT
Start: 2024-12-26 | End: 2024-12-26

## 2024-12-26 RX ORDER — METOPROLOL TARTRATE 1 MG/ML
5 INJECTION, SOLUTION INTRAVENOUS EVERY 5 MIN PRN
Status: DISCONTINUED | OUTPATIENT
Start: 2024-12-26 | End: 2024-12-26

## 2024-12-26 RX ORDER — AMINOPHYLLINE 25 MG/ML
50 INJECTION, SOLUTION INTRAVENOUS PRN
Status: DISCONTINUED | OUTPATIENT
Start: 2024-12-26 | End: 2024-12-26

## 2024-12-26 RX ORDER — SODIUM CHLORIDE 0.9 % (FLUSH) 0.9 %
10 SYRINGE (ML) INJECTION PRN
Status: COMPLETED | OUTPATIENT
Start: 2024-12-26 | End: 2024-12-26

## 2024-12-26 RX ORDER — SODIUM CHLORIDE 0.9 % (FLUSH) 0.9 %
5-40 SYRINGE (ML) INJECTION PRN
Status: DISCONTINUED | OUTPATIENT
Start: 2024-12-26 | End: 2024-12-26

## 2024-12-26 RX ORDER — REGADENOSON 0.08 MG/ML
0.4 INJECTION, SOLUTION INTRAVENOUS
Status: COMPLETED | OUTPATIENT
Start: 2024-12-26 | End: 2024-12-26

## 2024-12-26 RX ORDER — SODIUM CHLORIDE 9 MG/ML
500 INJECTION, SOLUTION INTRAVENOUS CONTINUOUS PRN
Status: DISCONTINUED | OUTPATIENT
Start: 2024-12-26 | End: 2024-12-26

## 2024-12-26 RX ORDER — ALBUTEROL SULFATE 90 UG/1
2 INHALANT RESPIRATORY (INHALATION) PRN
Status: DISCONTINUED | OUTPATIENT
Start: 2024-12-26 | End: 2024-12-26

## 2024-12-26 RX ORDER — TETRAKIS(2-METHOXYISOBUTYLISOCYANIDE)COPPER(I) TETRAFLUOROBORATE 1 MG/ML
36 INJECTION, POWDER, LYOPHILIZED, FOR SOLUTION INTRAVENOUS
Status: COMPLETED | OUTPATIENT
Start: 2024-12-26 | End: 2024-12-26

## 2024-12-26 RX ORDER — ATROPINE SULFATE 0.1 MG/ML
0.5 INJECTION INTRAVENOUS EVERY 5 MIN PRN
Status: DISCONTINUED | OUTPATIENT
Start: 2024-12-26 | End: 2024-12-26

## 2024-12-26 RX ORDER — NITROGLYCERIN 0.4 MG/1
0.4 TABLET SUBLINGUAL EVERY 5 MIN PRN
Status: DISCONTINUED | OUTPATIENT
Start: 2024-12-26 | End: 2024-12-26

## 2024-12-26 RX ADMIN — TETRAKIS(2-METHOXYISOBUTYLISOCYANIDE)COPPER(I) TETRAFLUOROBORATE 42.78 MILLICURIE: 1 INJECTION, POWDER, LYOPHILIZED, FOR SOLUTION INTRAVENOUS at 08:36

## 2024-12-26 RX ADMIN — TETRAKIS(2-METHOXYISOBUTYLISOCYANIDE)COPPER(I) TETRAFLUOROBORATE 14.3 MILLICURIE: 1 INJECTION, POWDER, LYOPHILIZED, FOR SOLUTION INTRAVENOUS at 07:22

## 2024-12-26 RX ADMIN — SODIUM CHLORIDE, PRESERVATIVE FREE 10 ML: 5 INJECTION INTRAVENOUS at 08:32

## 2024-12-26 RX ADMIN — SODIUM CHLORIDE, PRESERVATIVE FREE 10 ML: 5 INJECTION INTRAVENOUS at 07:22

## 2024-12-26 RX ADMIN — REGADENOSON 0.4 MG: 0.08 INJECTION, SOLUTION INTRAVENOUS at 08:35

## 2024-12-26 RX ADMIN — SODIUM CHLORIDE, PRESERVATIVE FREE 10 ML: 5 INJECTION INTRAVENOUS at 08:36

## 2024-12-26 NOTE — PROGRESS NOTES
Patient present for lexiscan stress test. Educated on procedure. All questions/concerns addressed. Allergies and medication reviewed. Patient prepped for procedure. Stress test will be supervised by Dr Kimball.

## 2024-12-30 ENCOUNTER — OFFICE VISIT (OUTPATIENT)
Age: 79
End: 2024-12-30
Payer: MEDICARE

## 2024-12-30 VITALS
HEIGHT: 65 IN | OXYGEN SATURATION: 96 % | HEART RATE: 88 BPM | WEIGHT: 115.2 LBS | SYSTOLIC BLOOD PRESSURE: 108 MMHG | DIASTOLIC BLOOD PRESSURE: 70 MMHG | TEMPERATURE: 98.1 F | BODY MASS INDEX: 19.19 KG/M2

## 2024-12-30 DIAGNOSIS — E78.2 MIXED HYPERLIPIDEMIA: ICD-10-CM

## 2024-12-30 DIAGNOSIS — I10 ESSENTIAL HYPERTENSION: ICD-10-CM

## 2024-12-30 DIAGNOSIS — K31.89 MESENTEROAXIAL GASTRIC VOLVULUS: ICD-10-CM

## 2024-12-30 DIAGNOSIS — I48.11 LONGSTANDING PERSISTENT ATRIAL FIBRILLATION (HCC): Primary | ICD-10-CM

## 2024-12-30 DIAGNOSIS — N18.32 STAGE 3B CHRONIC KIDNEY DISEASE (HCC): ICD-10-CM

## 2024-12-30 DIAGNOSIS — E03.3 POSTINFECTIOUS HYPOTHYROIDISM: Chronic | ICD-10-CM

## 2024-12-30 PROCEDURE — 3074F SYST BP LT 130 MM HG: CPT | Performed by: FAMILY MEDICINE

## 2024-12-30 PROCEDURE — 3078F DIAST BP <80 MM HG: CPT | Performed by: FAMILY MEDICINE

## 2024-12-30 PROCEDURE — 1036F TOBACCO NON-USER: CPT | Performed by: FAMILY MEDICINE

## 2024-12-30 PROCEDURE — G8427 DOCREV CUR MEDS BY ELIG CLIN: HCPCS | Performed by: FAMILY MEDICINE

## 2024-12-30 PROCEDURE — 99214 OFFICE O/P EST MOD 30 MIN: CPT | Performed by: FAMILY MEDICINE

## 2024-12-30 PROCEDURE — 1124F ACP DISCUSS-NO DSCNMKR DOCD: CPT | Performed by: FAMILY MEDICINE

## 2024-12-30 PROCEDURE — 1159F MED LIST DOCD IN RCRD: CPT | Performed by: FAMILY MEDICINE

## 2024-12-30 PROCEDURE — G8400 PT W/DXA NO RESULTS DOC: HCPCS | Performed by: FAMILY MEDICINE

## 2024-12-30 PROCEDURE — 1090F PRES/ABSN URINE INCON ASSESS: CPT | Performed by: FAMILY MEDICINE

## 2024-12-30 PROCEDURE — G8420 CALC BMI NORM PARAMETERS: HCPCS | Performed by: FAMILY MEDICINE

## 2024-12-30 PROCEDURE — G8482 FLU IMMUNIZE ORDER/ADMIN: HCPCS | Performed by: FAMILY MEDICINE

## 2024-12-30 RX ORDER — LORATADINE 10 MG/1
10 TABLET ORAL DAILY PRN
COMMUNITY

## 2024-12-30 NOTE — PROGRESS NOTES
Granulocytes % 12/17/2024 0     Neutrophils Absolute 12/17/2024 4.06     Lymphocytes Absolute 12/17/2024 3.49     Monocytes Absolute 12/17/2024 0.65     Eosinophils Absolute 12/17/2024 0.31     Basophils Absolute 12/17/2024 0.15     Immature Granulocytes Ab* 12/17/2024 0.03    Admission on 10/16/2024, Discharged on 10/17/2024   Component Date Value    WBC 10/16/2024 11.3 (H)     RBC 10/16/2024 4.01     Hemoglobin 10/16/2024 11.1 (L)     Hematocrit 10/16/2024 34.5 (L)     MCV 10/16/2024 85.9     MCH 10/16/2024 27.5     MCHC 10/16/2024 32.1     RDW 10/16/2024 14.2     Platelets 10/16/2024 257     MPV 10/16/2024 7.5     Neutrophils % 10/16/2024 73 (H)     Lymphocytes % 10/16/2024 15 (L)     Monocytes % 10/16/2024 7     Eosinophils % 10/16/2024 4     Basophils % 10/16/2024 1     Neutrophils Absolute 10/16/2024 8.40 (H)     Lymphocytes Absolute 10/16/2024 1.70     Monocytes Absolute 10/16/2024 0.70     Eosinophils Absolute 10/16/2024 0.50 (H)     Basophils Absolute 10/16/2024 0.10     Sodium 10/16/2024 139     Potassium 10/16/2024 4.3     Chloride 10/16/2024 108 (H)     CO2 10/16/2024 20     Anion Gap 10/16/2024 11     Glucose 10/16/2024 133 (H)     BUN 10/16/2024 32 (H)     Creatinine 10/16/2024 1.4 (H)     Est, Glom Filt Rate 10/16/2024 38 (L)     Calcium 10/16/2024 9.1    No results displayed because visit has over 200 results.           ASSESSMENT/PLAN     1. Longstanding persistent atrial fibrillation (HCC)  2. Essential hypertension  -     Comprehensive Metabolic Panel; Future  -     CBC with Auto Differential; Future  -     Comprehensive Metabolic Panel; Future  3. Mixed hyperlipidemia  -     Lipid Panel; Future  4. Postinfectious hypothyroidism  -     TSH; Future  -     T4, Free; Future  5. Stage 3b chronic kidney disease (HCC)  6. Mesenteroaxial gastric volvulus  Comments:  s/p repair       No orders of the defined types were placed in this encounter.    She notes she feels stronger than last visit.   Reviewed

## 2025-02-14 ENCOUNTER — TELEPHONE (OUTPATIENT)
Age: 80
End: 2025-02-14

## 2025-02-14 DIAGNOSIS — R30.0 DYSURIA: Primary | ICD-10-CM

## 2025-02-14 NOTE — TELEPHONE ENCOUNTER
Patient calls complaining of Urinary Complaints, finished clindamycin yesterday. Still unresolved.    Pain: on urination, urgency, frequency,   Onset: 14 day(s) ago  Timing: constant, intermittent  Severity: Moderate  Progression: increased  Associated Symptoms: none  Pain Level: 9 on the pain scale      Ok to leave a message if we call back yes , call [facility] [phone]

## 2025-02-15 RX ORDER — CEPHALEXIN 500 MG/1
500 CAPSULE ORAL 2 TIMES DAILY
Qty: 14 CAPSULE | Refills: 0 | Status: SHIPPED | OUTPATIENT
Start: 2025-02-15 | End: 2025-02-22

## 2025-02-26 ENCOUNTER — TELEPHONE (OUTPATIENT)
Age: 80
End: 2025-02-26

## 2025-02-26 NOTE — TELEPHONE ENCOUNTER
Scheduled an apt for patient accidentally looked at the wrong day and time and called patient back to see if she can come in Monday at 1140 instead scheduled to apt. And left a voicemail

## 2025-03-03 ENCOUNTER — OFFICE VISIT (OUTPATIENT)
Age: 80
End: 2025-03-03
Payer: MEDICARE

## 2025-03-03 ENCOUNTER — HOSPITAL ENCOUNTER (OUTPATIENT)
Age: 80
Setting detail: SPECIMEN
Discharge: HOME | End: 2025-03-03

## 2025-03-03 VITALS
WEIGHT: 114.8 LBS | OXYGEN SATURATION: 99 % | TEMPERATURE: 98.8 F | SYSTOLIC BLOOD PRESSURE: 118 MMHG | BODY MASS INDEX: 19.1 KG/M2 | DIASTOLIC BLOOD PRESSURE: 66 MMHG | HEART RATE: 70 BPM

## 2025-03-03 DIAGNOSIS — Z92.89 HISTORY OF CREATION OF VENTRICULOPERITONEAL SHUNT: ICD-10-CM

## 2025-03-03 DIAGNOSIS — R63.4 WEIGHT LOSS: ICD-10-CM

## 2025-03-03 DIAGNOSIS — K31.89 MESENTEROAXIAL GASTRIC VOLVULUS: ICD-10-CM

## 2025-03-03 DIAGNOSIS — K21.9 GERD WITHOUT ESOPHAGITIS: ICD-10-CM

## 2025-03-03 DIAGNOSIS — I48.11 LONGSTANDING PERSISTENT ATRIAL FIBRILLATION (HCC): Primary | ICD-10-CM

## 2025-03-03 DIAGNOSIS — E03.3 POSTINFECTIOUS HYPOTHYROIDISM: Chronic | ICD-10-CM

## 2025-03-03 LAB
ALBUMIN SERPL-MCNC: 3.9 G/DL (ref 3.5–5.2)
ALBUMIN/GLOB SERPL: 1.5 {RATIO} (ref 1–2.5)
ALP SERPL-CCNC: 79 U/L (ref 35–104)
ALT SERPL-CCNC: 10 U/L (ref 10–35)
ANION GAP SERPL CALCULATED.3IONS-SCNC: 10 MMOL/L (ref 9–16)
AST SERPL-CCNC: 18 U/L (ref 10–35)
BASOPHILS # BLD: 0.07 K/UL (ref 0–0.2)
BASOPHILS NFR BLD: 1 % (ref 0–2)
BILIRUB SERPL-MCNC: 0.2 MG/DL (ref 0–1.2)
BUN SERPL-MCNC: 30 MG/DL (ref 8–23)
CALCIUM SERPL-MCNC: 9.8 MG/DL (ref 8.6–10.4)
CHLORIDE SERPL-SCNC: 106 MMOL/L (ref 98–107)
CHOLEST SERPL-MCNC: 169 MG/DL (ref 0–199)
CHOLESTEROL/HDL RATIO: 2.6
CO2 SERPL-SCNC: 22 MMOL/L (ref 20–31)
CREAT SERPL-MCNC: 1.6 MG/DL (ref 0.6–0.9)
EOSINOPHIL # BLD: 0.14 K/UL (ref 0–0.44)
EOSINOPHILS RELATIVE PERCENT: 1 % (ref 1–4)
ERYTHROCYTE [DISTWIDTH] IN BLOOD BY AUTOMATED COUNT: 13.9 % (ref 11.8–14.4)
GFR, ESTIMATED: 32 ML/MIN/1.73M2
GLUCOSE SERPL-MCNC: 86 MG/DL (ref 74–99)
HCT VFR BLD AUTO: 39.3 % (ref 36.3–47.1)
HDLC SERPL-MCNC: 65 MG/DL
HGB BLD-MCNC: 11.9 G/DL (ref 11.9–15.1)
IMM GRANULOCYTES # BLD AUTO: 0.03 K/UL (ref 0–0.3)
IMM GRANULOCYTES NFR BLD: 0 %
LDLC SERPL CALC-MCNC: 50 MG/DL (ref 0–100)
LYMPHOCYTES NFR BLD: 2.43 K/UL (ref 1.1–3.7)
LYMPHOCYTES RELATIVE PERCENT: 25 % (ref 24–43)
MCH RBC QN AUTO: 26.7 PG (ref 25.2–33.5)
MCHC RBC AUTO-ENTMCNC: 30.3 G/DL (ref 28.4–34.8)
MCV RBC AUTO: 88.1 FL (ref 82.6–102.9)
MONOCYTES NFR BLD: 0.61 K/UL (ref 0.1–1.2)
MONOCYTES NFR BLD: 6 % (ref 3–12)
NEUTROPHILS NFR BLD: 67 % (ref 36–65)
NEUTS SEG NFR BLD: 6.54 K/UL (ref 1.5–8.1)
NRBC BLD-RTO: 0 PER 100 WBC
PLATELET # BLD AUTO: 201 K/UL (ref 138–453)
PMV BLD AUTO: 11 FL (ref 8.1–13.5)
POTASSIUM SERPL-SCNC: 4.5 MMOL/L (ref 3.7–5.3)
PROT SERPL-MCNC: 6.5 G/DL (ref 6.6–8.7)
RBC # BLD AUTO: 4.46 M/UL (ref 3.95–5.11)
SODIUM SERPL-SCNC: 138 MMOL/L (ref 136–145)
T4 FREE SERPL-MCNC: 1 NG/DL (ref 0.92–1.68)
TRIGL SERPL-MCNC: 272 MG/DL
TSH SERPL DL<=0.05 MIU/L-ACNC: 13 UIU/ML (ref 0.27–4.2)
VLDLC SERPL CALC-MCNC: 54 MG/DL (ref 1–30)
WBC OTHER # BLD: 9.8 K/UL (ref 3.5–11.3)

## 2025-03-03 PROCEDURE — 1124F ACP DISCUSS-NO DSCNMKR DOCD: CPT | Performed by: FAMILY MEDICINE

## 2025-03-03 PROCEDURE — 99213 OFFICE O/P EST LOW 20 MIN: CPT | Performed by: FAMILY MEDICINE

## 2025-03-03 PROCEDURE — G8427 DOCREV CUR MEDS BY ELIG CLIN: HCPCS | Performed by: FAMILY MEDICINE

## 2025-03-03 PROCEDURE — 1159F MED LIST DOCD IN RCRD: CPT | Performed by: FAMILY MEDICINE

## 2025-03-03 PROCEDURE — 3074F SYST BP LT 130 MM HG: CPT | Performed by: FAMILY MEDICINE

## 2025-03-03 PROCEDURE — 1090F PRES/ABSN URINE INCON ASSESS: CPT | Performed by: FAMILY MEDICINE

## 2025-03-03 PROCEDURE — G8420 CALC BMI NORM PARAMETERS: HCPCS | Performed by: FAMILY MEDICINE

## 2025-03-03 PROCEDURE — 1036F TOBACCO NON-USER: CPT | Performed by: FAMILY MEDICINE

## 2025-03-03 PROCEDURE — 3078F DIAST BP <80 MM HG: CPT | Performed by: FAMILY MEDICINE

## 2025-03-03 PROCEDURE — G8400 PT W/DXA NO RESULTS DOC: HCPCS | Performed by: FAMILY MEDICINE

## 2025-03-03 RX ORDER — FAMOTIDINE 20 MG/1
20 TABLET, FILM COATED ORAL 2 TIMES DAILY
Qty: 60 TABLET | Refills: 3 | Status: SHIPPED | OUTPATIENT
Start: 2025-03-03

## 2025-03-03 ASSESSMENT — PATIENT HEALTH QUESTIONNAIRE - PHQ9
5. POOR APPETITE OR OVEREATING: MORE THAN HALF THE DAYS
1. LITTLE INTEREST OR PLEASURE IN DOING THINGS: SEVERAL DAYS
10. IF YOU CHECKED OFF ANY PROBLEMS, HOW DIFFICULT HAVE THESE PROBLEMS MADE IT FOR YOU TO DO YOUR WORK, TAKE CARE OF THINGS AT HOME, OR GET ALONG WITH OTHER PEOPLE: SOMEWHAT DIFFICULT
SUM OF ALL RESPONSES TO PHQ QUESTIONS 1-9: 10
7. TROUBLE CONCENTRATING ON THINGS, SUCH AS READING THE NEWSPAPER OR WATCHING TELEVISION: NEARLY EVERY DAY
SUM OF ALL RESPONSES TO PHQ QUESTIONS 1-9: 10
6. FEELING BAD ABOUT YOURSELF - OR THAT YOU ARE A FAILURE OR HAVE LET YOURSELF OR YOUR FAMILY DOWN: NOT AT ALL
SUM OF ALL RESPONSES TO PHQ QUESTIONS 1-9: 10
9. THOUGHTS THAT YOU WOULD BE BETTER OFF DEAD, OR OF HURTING YOURSELF: NOT AT ALL
3. TROUBLE FALLING OR STAYING ASLEEP: SEVERAL DAYS
2. FEELING DOWN, DEPRESSED OR HOPELESS: NOT AT ALL
4. FEELING TIRED OR HAVING LITTLE ENERGY: NEARLY EVERY DAY
8. MOVING OR SPEAKING SO SLOWLY THAT OTHER PEOPLE COULD HAVE NOTICED. OR THE OPPOSITE, BEING SO FIGETY OR RESTLESS THAT YOU HAVE BEEN MOVING AROUND A LOT MORE THAN USUAL: NOT AT ALL
SUM OF ALL RESPONSES TO PHQ QUESTIONS 1-9: 10

## 2025-03-03 NOTE — PROGRESS NOTES
supraclavicular, infraclavicular  Heart: S1+S2, no murmurs, RRR, no carotid bruits  Lungs: clear to auscultation without wheezes or rales  Abdomen: soft, nontender, no guarding, no rebound, no masses, or hernias  Extremity: Normal, without deformities, edema, or skin discoloration  SKIN: Skin color, texture, turgor normal. No rashes or lesions.  Psych:  Mood and affect normal  Back: ROM within normal limits, no tenderness    PREVENTATIVE CARE     Colonoscopy:   No colonoscopy on file    Mammogram:   No breast cancer screening on file    DEXA:   Date of last DEXA: 4/16/2014     LABS     Hospital Outpatient Visit on 12/26/2024   Component Date Value    Body Surface Area 12/26/2024 1.6     Baseline Systolic BP 12/26/2024 200     Baseline Diastolic BP 12/26/2024 92     Stress Systolic BP 12/26/2024 200     Stress Diastolic BP 12/26/2024 92     Baseline HR 12/26/2024 70     Stress Peak HR 12/26/2024 88     Stress Estimated Workload 12/26/2024 1.0     Stress Rate Pressure Pro* 12/26/2024 17,600     Stress Target HR 12/26/2024 139     Stress Percent HR Achiev* 12/26/2024 63     TID 12/26/2024 1.11     Nuc Stress EF 12/26/2024 70    Hospital Outpatient Visit on 12/26/2024   Component Date Value    LA Minor [place] 12/26/2024 5.4     LA Major [place] 12/26/2024 4.7     LA Area 2C 12/26/2024 18.1     LA Area 4C 12/26/2024 15.8     LA Volume MOD A2C 12/26/2024 50     LA Volume MOD A4C 12/26/2024 40     LA Volume BP 12/26/2024 48     LA Diameter 12/26/2024 3.8     AR PHT 12/26/2024 614.0     AR Max Velocity PISA 12/26/2024 4.8     AV Peak Velocity 12/26/2024 1.2     AV Peak Gradient 12/26/2024 6     AV Mean Gradient 12/26/2024 3     AV VTI 12/26/2024 26.8     AV Mean Velocity 12/26/2024 0.9     AV Area by VTI 12/26/2024 2.3     AV Area by Peak Velocity 12/26/2024 2.2     Aortic Root 12/26/2024 2.9     Ascending Aorta 12/26/2024 3.6     IVC Expiration 12/26/2024 1.6     IVC Inspiration 12/26/2024 0.6     IVSd 12/26/2024 1.0 (A)

## 2025-03-10 ENCOUNTER — TELEPHONE (OUTPATIENT)
Age: 80
End: 2025-03-10

## 2025-03-10 DIAGNOSIS — N30.00 ACUTE CYSTITIS WITHOUT HEMATURIA: Primary | ICD-10-CM

## 2025-03-10 NOTE — TELEPHONE ENCOUNTER
PT has return on urine frequency/urgency and pain for past 2 days. Asking for medication to treat along with NORCO to help with her fibromyalgia.  Also, APT asking if you spoke with DR. Bernard about her surgery she had back in sept.

## 2025-03-12 ENCOUNTER — HOSPITAL ENCOUNTER (OUTPATIENT)
Age: 80
Setting detail: SPECIMEN
Discharge: HOME | End: 2025-03-12

## 2025-03-12 LAB
BACTERIA URNS QL MICRO: ABNORMAL
BILIRUB UR QL STRIP: NEGATIVE
CLARITY UR: ABNORMAL
COLOR UR: YELLOW
CRYSTALS URNS MICRO: ABNORMAL /HPF
CRYSTALS URNS MICRO: ABNORMAL /HPF
EPI CELLS #/AREA URNS HPF: ABNORMAL /HPF (ref 0–5)
GLUCOSE UR STRIP-MCNC: NEGATIVE MG/DL
HGB UR QL STRIP.AUTO: ABNORMAL
KETONES UR STRIP-MCNC: NEGATIVE MG/DL
LEUKOCYTE ESTERASE UR QL STRIP: ABNORMAL
NITRITE UR QL STRIP: POSITIVE
PH UR STRIP: 5.5 [PH] (ref 5–8)
PROT UR STRIP-MCNC: NEGATIVE MG/DL
RBC #/AREA URNS HPF: ABNORMAL /HPF (ref 0–2)
SP GR UR STRIP: 1.01 (ref 1–1.03)
UROBILINOGEN UR STRIP-ACNC: NORMAL EU/DL (ref 0–1)
WBC #/AREA URNS HPF: ABNORMAL /HPF (ref 0–5)

## 2025-03-14 LAB
MICROORGANISM SPEC CULT: ABNORMAL
SPECIMEN DESCRIPTION: ABNORMAL

## 2025-03-17 ENCOUNTER — RESULTS FOLLOW-UP (OUTPATIENT)
Age: 80
End: 2025-03-17

## 2025-03-17 DIAGNOSIS — R19.7 DIARRHEA OF PRESUMED INFECTIOUS ORIGIN: Primary | ICD-10-CM

## 2025-03-17 RX ORDER — CEPHALEXIN 500 MG/1
500 CAPSULE ORAL 2 TIMES DAILY
Qty: 14 CAPSULE | Refills: 1 | OUTPATIENT
Start: 2025-03-17 | End: 2025-03-18 | Stop reason: SDUPTHER

## 2025-03-17 NOTE — TELEPHONE ENCOUNTER
Tell patient I spoke with Dr. Bernard  She suggests do a stool culture , so I placed an order for a stool for C. Diff - go to a [facility] lab to get container, then drop back to lab and they will run test  Urine shows a UTI, so we sent a scrip for Keflex into pharmacy  Keep appt with diettitian  Call about her diarrhea next week  Other labs looked OK, appt to see me in 4-6 weeks

## 2025-03-18 ENCOUNTER — TELEPHONE (OUTPATIENT)
Age: 80
End: 2025-03-18

## 2025-03-18 DIAGNOSIS — N30.00 ACUTE CYSTITIS WITHOUT HEMATURIA: Primary | ICD-10-CM

## 2025-03-18 RX ORDER — CEPHALEXIN 500 MG/1
500 CAPSULE ORAL 2 TIMES DAILY
Qty: 14 CAPSULE | Refills: 1 | Status: SHIPPED | OUTPATIENT
Start: 2025-03-18

## 2025-03-18 NOTE — TELEPHONE ENCOUNTER
Dr thompson ordered keflex for the patient but it looks like the script was printed. Can someone send this in for her to [facility]?

## 2025-03-20 ENCOUNTER — HOSPITAL ENCOUNTER (OUTPATIENT)
Age: 80
Setting detail: SPECIMEN
Discharge: HOME | End: 2025-03-20

## 2025-03-21 LAB
C DIFF GDH + TOXINS A+B STL QL IA.RAPID: NEGATIVE
SPECIMEN DESCRIPTION: NORMAL

## 2025-03-25 ENCOUNTER — RESULTS FOLLOW-UP (OUTPATIENT)
Age: 80
End: 2025-03-25

## 2025-03-25 ENCOUNTER — HOSPITAL ENCOUNTER (OUTPATIENT)
Dept: NUTRITION | Age: 80
Setting detail: THERAPIES SERIES
Discharge: HOME | End: 2025-03-25
Attending: FAMILY MEDICINE
Payer: MEDICARE

## 2025-03-25 VITALS — BODY MASS INDEX: 19.1 KG/M2 | HEIGHT: 65 IN

## 2025-03-25 DIAGNOSIS — E43 SEVERE MALNUTRITION: Primary | ICD-10-CM

## 2025-03-25 PROCEDURE — 97802 MEDICAL NUTRITION INDIV IN: CPT

## 2025-03-25 NOTE — PROGRESS NOTES
Comprehensive Nutrition Assessment    Type and Reason for Visit:  Patient education    Nutrition Recommendations/Plan:   Maintain current body weight and slowly start to gain weight as tolerated  Recommendations:   Calories 6842-9125 kcal/day; 68-83 gm of protein/day  Avoid high fat proteins and try proteins like chicken, turkey, fish, yogurt and cottage cheese  High Calorie, high protein Boost 2x/day  Six frequent small meals and create meal schedule to help increase oral intakes  Handouts given: 1800 calorie 5-Day Menu; General Healthful Nutrition Therapy, Diarrhea Nutrition Therapy; High Calorie, High Protein Nutrition Therapy and Recipes  Consult physician about elevated TSH labs  Continue MVI daily  Increase physical activity- starting walking in the house to increase endurance     Malnutrition Assessment:  Malnutrition Status:  Severe malnutrition (03/25/25 1433)    Context:  Chronic Illness     Findings of the 6 clinical characteristics of malnutrition:  Energy Intake:  75% or less estimated energy requirements for 1 month or longer  Weight Loss:  Greater than 10% over 6 months     Body Fat Loss:  Severe body fat loss Triceps   Muscle Mass Loss:  Severe muscle mass loss Clavicles (pectoralis & deltoids), Hand (interosseous)  Fluid Accumulation:  No fluid accumulation     Strength:  Not Performed    Nutrition Assessment:    Patient seen for unplanned weight loss and issues with diarrhea. Patient came to outpatient nutrition counseling appointment with her son/ caregiver. Patient reports being status post mesenteroaxial gastric volvulus in September 2024. Patient was aware that weight loss, nausea and acid reflux would be likely after surgery. Patient had a usual body weight for 130 lbs before surgery and now current weight is 110 lbs. Patient has lost 15.4% of weight over the past 7 months and has severe fat and muscle mass loss. Patient is severely malnourished. Patient also complains of diarrhea issues

## 2025-03-30 NOTE — RESULT ENCOUNTER NOTE
1. OK to take 2-4 immodium for years  2. T4 normal, TSH up  a little  = borderline low thyroid   INCREASING THYROID DOSE COULD INCREASE DIARRHEA, SO WILL NOT DO NOW

## 2025-03-31 DIAGNOSIS — N30.00 ACUTE CYSTITIS WITHOUT HEMATURIA: ICD-10-CM

## 2025-03-31 RX ORDER — CEPHALEXIN 500 MG/1
500 CAPSULE ORAL 2 TIMES DAILY
Qty: 14 CAPSULE | Refills: 1 | Status: SHIPPED | OUTPATIENT
Start: 2025-03-31

## 2025-03-31 NOTE — TELEPHONE ENCOUNTER
FOR BLADDER INFECTION  Monalisa Mcdowell is calling to request a refill on the following medication(s):    Medication Request:  Requested Prescriptions     Pending Prescriptions Disp Refills    cephALEXin (KEFLEX) 500 MG capsule 14 capsule 1     Sig: Take 1 capsule by mouth 2 times daily       Last Visit Date (If Applicable):  3/3/2025    Next Visit Date:    4/2/2025

## 2025-04-01 NOTE — PATIENT INSTRUCTIONS
you get started. Remove things like TV cords, coffee tables, and throw rugs. It's safest to have plenty of space to move freely.  The key to getting more active is to take it slow and steady. Try to improve only a little bit at a time. Pick just one area to improve on at first. And if an activity hurts, stop and talk to your doctor.  Where can you learn more?  Go to https://www.[employer].net/patientEd and enter P600 to learn more about \"Learning About Being Active as an Older Adult.\"  Current as of: July 31, 2024  Content Version: 14.4  © 8352-4352 [employer].   Care instructions adapted under license by [employer] [facility]. If you have questions about a medical condition or this instruction, always ask your healthcare professional. [employer], disclaims any warranty or liability for your use of this information.         Learning About Dental Care for Older Adults  Dental care for older adults: Overview  Dental care for older people is much the same as for younger adults. But older adults do have concerns that younger adults do not. Older adults may have problems with gum disease and decay on the roots of their teeth. They may need missing teeth replaced or broken fillings fixed. Or they may have dentures that need to be cared for. Some older adults may have trouble holding a toothbrush.  You can help remind the person you are caring for to brush and floss their teeth or to clean their dentures. In some cases, you may need to do the brushing and other dental care tasks. People who have trouble using their hands or who have dementia may need this extra help.  How can you help with dental care?  Normal dental care  To keep the teeth and gums healthy:  Brush the teeth with fluoride toothpaste twice a day--in the morning and at night--and floss at least once a day. Plaque can quickly build up on the teeth of older adults.  Watch for the signs of gum disease. These signs include gums that bleed after

## 2025-04-02 ENCOUNTER — OFFICE VISIT (OUTPATIENT)
Age: 80
End: 2025-04-02

## 2025-04-02 VITALS
OXYGEN SATURATION: 97 % | BODY MASS INDEX: 20.06 KG/M2 | WEIGHT: 120.4 LBS | DIASTOLIC BLOOD PRESSURE: 78 MMHG | SYSTOLIC BLOOD PRESSURE: 130 MMHG | HEART RATE: 77 BPM | HEIGHT: 65 IN | TEMPERATURE: 97.5 F

## 2025-04-02 DIAGNOSIS — I48.11 LONGSTANDING PERSISTENT ATRIAL FIBRILLATION (HCC): Primary | ICD-10-CM

## 2025-04-02 DIAGNOSIS — K31.89 MESENTEROAXIAL GASTRIC VOLVULUS: ICD-10-CM

## 2025-04-02 DIAGNOSIS — D50.0 IRON DEFICIENCY ANEMIA DUE TO CHRONIC BLOOD LOSS: ICD-10-CM

## 2025-04-02 DIAGNOSIS — E03.3 POSTINFECTIOUS HYPOTHYROIDISM: Chronic | ICD-10-CM

## 2025-04-02 DIAGNOSIS — I10 ESSENTIAL HYPERTENSION: ICD-10-CM

## 2025-04-02 DIAGNOSIS — N30.00 ACUTE CYSTITIS WITHOUT HEMATURIA: ICD-10-CM

## 2025-04-02 DIAGNOSIS — Z00.00 MEDICARE ANNUAL WELLNESS VISIT, SUBSEQUENT: ICD-10-CM

## 2025-04-02 ASSESSMENT — PATIENT HEALTH QUESTIONNAIRE - PHQ9
7. TROUBLE CONCENTRATING ON THINGS, SUCH AS READING THE NEWSPAPER OR WATCHING TELEVISION: SEVERAL DAYS
4. FEELING TIRED OR HAVING LITTLE ENERGY: NEARLY EVERY DAY
8. MOVING OR SPEAKING SO SLOWLY THAT OTHER PEOPLE COULD HAVE NOTICED. OR THE OPPOSITE, BEING SO FIGETY OR RESTLESS THAT YOU HAVE BEEN MOVING AROUND A LOT MORE THAN USUAL: SEVERAL DAYS
SUM OF ALL RESPONSES TO PHQ QUESTIONS 1-9: 10
SUM OF ALL RESPONSES TO PHQ QUESTIONS 1-9: 10
3. TROUBLE FALLING OR STAYING ASLEEP: NEARLY EVERY DAY
9. THOUGHTS THAT YOU WOULD BE BETTER OFF DEAD, OR OF HURTING YOURSELF: NOT AT ALL
5. POOR APPETITE OR OVEREATING: MORE THAN HALF THE DAYS
SUM OF ALL RESPONSES TO PHQ QUESTIONS 1-9: 10
1. LITTLE INTEREST OR PLEASURE IN DOING THINGS: NOT AT ALL
6. FEELING BAD ABOUT YOURSELF - OR THAT YOU ARE A FAILURE OR HAVE LET YOURSELF OR YOUR FAMILY DOWN: NOT AT ALL
SUM OF ALL RESPONSES TO PHQ QUESTIONS 1-9: 10
2. FEELING DOWN, DEPRESSED OR HOPELESS: NOT AT ALL
10. IF YOU CHECKED OFF ANY PROBLEMS, HOW DIFFICULT HAVE THESE PROBLEMS MADE IT FOR YOU TO DO YOUR WORK, TAKE CARE OF THINGS AT HOME, OR GET ALONG WITH OTHER PEOPLE: SOMEWHAT DIFFICULT

## 2025-04-02 ASSESSMENT — LIFESTYLE VARIABLES
HOW MANY STANDARD DRINKS CONTAINING ALCOHOL DO YOU HAVE ON A TYPICAL DAY: PATIENT DOES NOT DRINK
HOW OFTEN DO YOU HAVE A DRINK CONTAINING ALCOHOL: NEVER

## 2025-04-02 NOTE — PROGRESS NOTES
Medicare Annual Wellness Visit    Monalisa Mcdowell is here for Medicare AWV (She is here for her Subsequent AWV with labs done on 03/03/2025.  Care Team was reviewed. )    Assessment & Plan   Longstanding persistent atrial fibrillation (HCC)  Essential hypertension  -     CBC with Auto Differential; Future  Mesenteroaxial gastric volvulus  -     Iron and TIBC; Future  Postinfectious hypothyroidism  -     TSH; Future  -     T3, Free; Future  -     T4, Free; Future  Medicare annual wellness visit, subsequent  Acute cystitis without hematuria  -     Urinalysis with Reflex to Culture; Future  -     [facility] - Viktor Thorne DO, Urogynecology, [place]  Iron deficiency anemia due to chronic blood loss  -     Iron and TIBC; Future       Return in 6 months (on 10/2/2025).     Subjective   The following acute and/or chronic problems were also addressed today:    Patient reports she has been experiencing fatigue, headaches, and TMJ pain, onset this morning. She has previously tried taking Tramadol 1 tablet as needed.   Recommend increasing Tramadol to 2 tablets as needed for headaches.   Patient underwent hiatal hernia repair with nissen fundoplication and EGD on 9/17/24 with Dr. Bernard. She continues to increase her dietary consumption to tolerance postoperatively. Patient has had multiple bowel movements today that are consistent with diarrhea. Patient has been evaluated by Nutritionist, who recommended increasing protein consumption.   Advised taking Imodium as needed for diarrhea, up to 3-4 tablets per day. Recommend two tablets every morning.   She reports experiencing lightheadedness yesterday, after which she rested on her couch. While sitting down, she experienced palpitations described as \"whole body beating.\"  Reviewed EKG from 2024, which showed cardiomegaly with no arrhythmia.   Most recent CBC was not consistent with iron deficiency anemia.   Will discontinue iron supplementation at this time.   Repeat iron and TIBC, CBC

## 2025-04-16 ENCOUNTER — HOSPITAL ENCOUNTER (OUTPATIENT)
Age: 80
Setting detail: SPECIMEN
Discharge: HOME | End: 2025-04-16

## 2025-04-16 LAB
BILIRUB UR QL STRIP: NEGATIVE
CASTS #/AREA URNS LPF: ABNORMAL /LPF (ref 0–2)
CASTS #/AREA URNS LPF: ABNORMAL /LPF (ref 0–2)
CLARITY UR: CLEAR
COLOR UR: YELLOW
CRYSTALS URNS MICRO: ABNORMAL /HPF
CRYSTALS URNS MICRO: ABNORMAL /HPF
EPI CELLS #/AREA URNS HPF: ABNORMAL /HPF (ref 0–5)
GLUCOSE UR STRIP-MCNC: NEGATIVE MG/DL
HGB UR QL STRIP.AUTO: NEGATIVE
KETONES UR STRIP-MCNC: NEGATIVE MG/DL
LEUKOCYTE ESTERASE UR QL STRIP: ABNORMAL
NITRITE UR QL STRIP: NEGATIVE
PH UR STRIP: 6 [PH] (ref 5–8)
PROT UR STRIP-MCNC: NEGATIVE MG/DL
RBC #/AREA URNS HPF: ABNORMAL /HPF (ref 0–2)
SP GR UR STRIP: 1.01 (ref 1–1.03)
UROBILINOGEN UR STRIP-ACNC: NORMAL EU/DL (ref 0–1)
WBC #/AREA URNS HPF: ABNORMAL /HPF (ref 0–5)

## 2025-04-20 ENCOUNTER — RESULTS FOLLOW-UP (OUTPATIENT)
Age: 80
End: 2025-04-20

## 2025-04-24 DIAGNOSIS — K21.9 GERD WITHOUT ESOPHAGITIS: ICD-10-CM

## 2025-04-24 RX ORDER — FAMOTIDINE 20 MG/1
20 TABLET, FILM COATED ORAL 2 TIMES DAILY
Qty: 180 TABLET | Refills: 3 | Status: SHIPPED | OUTPATIENT
Start: 2025-04-24

## 2025-04-24 NOTE — TELEPHONE ENCOUNTER
Monalisa Mcdowell is calling to request a refill on the following medication(s):    Medication Request:  Requested Prescriptions     Pending Prescriptions Disp Refills    famotidine (PEPCID) 20 MG tablet 180 tablet 0     Sig: Take 1 tablet by mouth 2 times daily       Last Visit Date (If Applicable):  4/2/2025    Next Visit Date:    8/4/2025

## 2025-06-15 ENCOUNTER — TELEPHONE (OUTPATIENT)
Age: 80
End: 2025-06-15

## 2025-06-15 ENCOUNTER — HOSPITAL ENCOUNTER (OUTPATIENT)
Age: 80
Setting detail: SPECIMEN
Discharge: HOME | End: 2025-06-15
Payer: MEDICARE

## 2025-06-15 DIAGNOSIS — N39.0 RECURRENT UTI: Primary | ICD-10-CM

## 2025-06-15 DIAGNOSIS — N30.00 ACUTE CYSTITIS WITHOUT HEMATURIA: ICD-10-CM

## 2025-06-15 PROCEDURE — 87186 SC STD MICRODIL/AGAR DIL: CPT

## 2025-06-15 PROCEDURE — 87088 URINE BACTERIA CULTURE: CPT

## 2025-06-15 PROCEDURE — 81001 URINALYSIS AUTO W/SCOPE: CPT

## 2025-06-15 PROCEDURE — 87086 URINE CULTURE/COLONY COUNT: CPT

## 2025-06-15 RX ORDER — CEPHALEXIN 500 MG/1
500 CAPSULE ORAL 2 TIMES DAILY
Qty: 14 CAPSULE | Refills: 0 | Status: SHIPPED | OUTPATIENT
Start: 2025-06-15 | End: 2025-06-22

## 2025-06-15 RX ORDER — CEPHALEXIN 500 MG/1
500 CAPSULE ORAL 3 TIMES DAILY
Qty: 21 CAPSULE | Refills: 0 | Status: SHIPPED | OUTPATIENT
Start: 2025-06-15 | End: 2025-06-15

## 2025-06-16 LAB
BILIRUB UR QL STRIP: NEGATIVE
CLARITY UR: ABNORMAL
COLOR UR: YELLOW
CRYSTALS URNS MICRO: ABNORMAL /HPF
CRYSTALS URNS MICRO: ABNORMAL /HPF
EPI CELLS #/AREA URNS HPF: ABNORMAL /HPF (ref 0–5)
GLUCOSE UR STRIP-MCNC: NEGATIVE MG/DL
HGB UR QL STRIP.AUTO: ABNORMAL
KETONES UR STRIP-MCNC: NEGATIVE MG/DL
LEUKOCYTE ESTERASE UR QL STRIP: ABNORMAL
NITRITE UR QL STRIP: NEGATIVE
PH UR STRIP: 6.5 [PH] (ref 5–8)
PROT UR STRIP-MCNC: ABNORMAL MG/DL
RBC #/AREA URNS HPF: ABNORMAL /HPF (ref 0–2)
SP GR UR STRIP: 1.01 (ref 1–1.03)
UROBILINOGEN UR STRIP-ACNC: 0.2 EU/DL (ref 0–1)
WBC #/AREA URNS HPF: ABNORMAL /HPF (ref 0–5)

## 2025-06-17 ENCOUNTER — APPOINTMENT (OUTPATIENT)
Dept: GENERAL RADIOLOGY | Age: 80
DRG: 086 | End: 2025-06-17
Payer: MEDICARE

## 2025-06-17 ENCOUNTER — HOSPITAL ENCOUNTER (EMERGENCY)
Age: 80
Discharge: ANOTHER HOSPITAL | DRG: 086 | End: 2025-06-17
Attending: EMERGENCY MEDICINE
Payer: MEDICARE

## 2025-06-17 ENCOUNTER — APPOINTMENT (OUTPATIENT)
Dept: CT IMAGING | Age: 80
DRG: 086 | End: 2025-06-17
Payer: MEDICARE

## 2025-06-17 ENCOUNTER — HOSPITAL ENCOUNTER (INPATIENT)
Age: 80
LOS: 2 days | Discharge: HOME | DRG: 086 | End: 2025-06-19
Attending: EMERGENCY MEDICINE | Admitting: SURGERY
Payer: MEDICARE

## 2025-06-17 VITALS
DIASTOLIC BLOOD PRESSURE: 70 MMHG | TEMPERATURE: 97.4 F | SYSTOLIC BLOOD PRESSURE: 165 MMHG | HEIGHT: 65 IN | WEIGHT: 115 LBS | RESPIRATION RATE: 16 BRPM | HEART RATE: 89 BPM | BODY MASS INDEX: 19.16 KG/M2 | OXYGEN SATURATION: 95 %
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
## 2025-06-17 DIAGNOSIS — S06.5XAA SDH (SUBDURAL HEMATOMA) (HCC): Primary | ICD-10-CM

## 2025-06-17 DIAGNOSIS — S06.5XAA SUBDURAL HEMATOMA (HCC): ICD-10-CM

## 2025-06-17 DIAGNOSIS — S09.90XA INJURY OF HEAD, INITIAL ENCOUNTER: Primary | ICD-10-CM

## 2025-06-17 LAB
25(OH)D3 SERPL-MCNC: 38.9 NG/ML (ref 30–100)
ABO + RH BLD: NORMAL
ALBUMIN SERPL-MCNC: 3.8 G/DL (ref 3.5–5.2)
ANION GAP SERPL CALCULATED.3IONS-SCNC: 12 MMOL/L (ref 9–16)
ANION GAP SERPL CALCULATED.3IONS-SCNC: 12 MMOL/L (ref 9–16)
ARM BAND NUMBER: NORMAL
BASOPHILS # BLD: 0 K/UL (ref 0–0.2)
BASOPHILS NFR BLD: 1 % (ref 0–2)
BLOOD BANK SAMPLE EXPIRATION: NORMAL
BLOOD BANK SPECIMEN: ABNORMAL
BLOOD GROUP ANTIBODIES SERPL: NEGATIVE
BODY TEMPERATURE: 37
BUN SERPL-MCNC: 19 MG/DL (ref 8–23)
BUN SERPL-MCNC: 19 MG/DL (ref 8–23)
CALCIUM SERPL-MCNC: 9 MG/DL (ref 8.6–10.4)
CHLORIDE SERPL-SCNC: 105 MMOL/L (ref 98–107)
CHLORIDE SERPL-SCNC: 105 MMOL/L (ref 98–107)
CK SERPL-CCNC: 45 U/L (ref 26–192)
CO2 SERPL-SCNC: 21 MMOL/L (ref 20–31)
CO2 SERPL-SCNC: 22 MMOL/L (ref 20–31)
COHGB MFR BLD: 2.2 % (ref 0–5)
CREAT SERPL-MCNC: 1.5 MG/DL (ref 0.6–0.9)
CREAT SERPL-MCNC: 1.5 MG/DL (ref 0.6–0.9)
EOSINOPHIL # BLD: 0.1 K/UL (ref 0–0.4)
EOSINOPHILS RELATIVE PERCENT: 1 % (ref 1–4)
ERYTHROCYTE [DISTWIDTH] IN BLOOD BY AUTOMATED COUNT: 12.9 % (ref 11.8–14.4)
ERYTHROCYTE [DISTWIDTH] IN BLOOD BY AUTOMATED COUNT: 13 % (ref 12.5–15.4)
EST. AVERAGE GLUCOSE BLD GHB EST-MCNC: 114 MG/DL
ETHANOL PERCENT: <0.01 %
ETHANOLAMINE SERPL-MCNC: <10 MG/DL (ref 0–0.08)
FIBRINOGEN, FUNCTIONAL TEG: 22.9 MM (ref 15–32)
FIBRINOGEN, FUNCTIONAL TEG: 22.9 MM (ref 15–32)
FIO2 ON VENT: ABNORMAL %
GFR, ESTIMATED: 35 ML/MIN/1.73M2
GFR, ESTIMATED: 35 ML/MIN/1.73M2
GLUCOSE SERPL-MCNC: 84 MG/DL (ref 74–99)
GLUCOSE SERPL-MCNC: 84 MG/DL (ref 74–99)
HBA1C MFR BLD: 5.6 % (ref 4–6)
HCO3 VENOUS: 21 MMOL/L (ref 24–30)
HCT VFR BLD AUTO: 39.8 % (ref 36–46)
HCT VFR BLD AUTO: 40.4 % (ref 36.3–47.1)
HGB BLD-MCNC: 12.7 G/DL (ref 12–16)
HGB BLD-MCNC: 12.8 G/DL (ref 11.9–15.1)
INR PPP: 0.9
INR PPP: 1 (ref 0.9–1.2)
LY30 (LYSIS) TEG: 0.1 % (ref 0–2.6)
LY30 (LYSIS) TEG: 0.3 % (ref 0–2.6)
LYMPHOCYTES NFR BLD: 1.8 K/UL (ref 1–4.8)
LYMPHOCYTES RELATIVE PERCENT: 18 % (ref 24–44)
MA(MAX CLOT) RAPID TEG: 63.1 MM (ref 52–70)
MA(MAX CLOT) RAPID TEG: 64.4 MM (ref 52–70)
MCH RBC QN AUTO: 27.4 PG (ref 26–34)
MCH RBC QN AUTO: 28.2 PG (ref 25.2–33.5)
MCHC RBC AUTO-ENTMCNC: 31.7 G/DL (ref 28.4–34.8)
MCHC RBC AUTO-ENTMCNC: 31.9 G/DL (ref 31–37)
MCV RBC AUTO: 86 FL (ref 80–100)
MCV RBC AUTO: 89 FL (ref 82.6–102.9)
MICROORGANISM SPEC CULT: ABNORMAL
MONOCYTES NFR BLD: 0.7 K/UL (ref 0.1–1.2)
MONOCYTES NFR BLD: 7 % (ref 2–11)
MYOGLOBIN SERPL-MCNC: 51 NG/ML (ref 25–58)
NEGATIVE BASE EXCESS, VEN: 4 MMOL/L (ref 0–2)
NEUTROPHILS NFR BLD: 73 % (ref 36–66)
NEUTS SEG NFR BLD: 7.2 K/UL (ref 1.8–7.7)
NRBC BLD-RTO: 0 PER 100 WBC
O2 SAT, VEN: 57.9 % (ref 60–85)
PARTIAL THROMBOPLASTIN TIME: 29.8 SEC (ref 24–36)
PARTIAL THROMBOPLASTIN TIME: 31.2 SEC (ref 23–36.5)
PCO2 VENOUS: 38.2 MM HG (ref 39–55)
PH VENOUS: 7.36 (ref 7.32–7.42)
PLATELET # BLD AUTO: 192 K/UL (ref 138–453)
PLATELET # BLD AUTO: 203 K/UL (ref 140–450)
PMV BLD AUTO: 7.3 FL (ref 6–12)
PMV BLD AUTO: 9.3 FL (ref 8.1–13.5)
PO2 VENOUS: 31.4 MM HG (ref 30–50)
POTASSIUM SERPL-SCNC: 3.8 MMOL/L (ref 3.7–5.3)
POTASSIUM SERPL-SCNC: 3.9 MMOL/L (ref 3.7–5.3)
PROTHROMBIN TIME: 12.4 SEC (ref 11.7–14.9)
PROTHROMBIN TIME: 13.7 SEC (ref 11.8–14.6)
RBC # BLD AUTO: 4.54 M/UL (ref 3.95–5.11)
RBC # BLD AUTO: 4.62 M/UL (ref 4–5.2)
REACTION TIME TEG: 6.5 MIN (ref 4.6–9.1)
REACTION TIME TEG: 7.4 MIN (ref 4.6–9.1)
SODIUM SERPL-SCNC: 138 MMOL/L (ref 136–145)
SODIUM SERPL-SCNC: 139 MMOL/L (ref 136–145)
SPECIMEN DESCRIPTION: ABNORMAL
T4 FREE SERPL-MCNC: 1 NG/DL (ref 0.92–1.68)
TROPONIN I SERPL HS-MCNC: 27 NG/L (ref 0–14)
TSH SERPL DL<=0.05 MIU/L-ACNC: 17 UIU/ML (ref 0.27–4.2)
VIT B12 SERPL-MCNC: 596 PG/ML (ref 232–1245)
WBC OTHER # BLD: 9.5 K/UL (ref 3.5–11.3)
WBC OTHER # BLD: 9.9 K/UL (ref 3.5–11)

## 2025-06-17 PROCEDURE — 85576 BLOOD PLATELET AGGREGATION: CPT

## 2025-06-17 PROCEDURE — 85730 THROMBOPLASTIN TIME PARTIAL: CPT

## 2025-06-17 PROCEDURE — G0480 DRUG TEST DEF 1-7 CLASSES: HCPCS

## 2025-06-17 PROCEDURE — 84443 ASSAY THYROID STIM HORMONE: CPT

## 2025-06-17 PROCEDURE — 80048 BASIC METABOLIC PNL TOTAL CA: CPT

## 2025-06-17 PROCEDURE — 36415 COLL VENOUS BLD VENIPUNCTURE: CPT

## 2025-06-17 PROCEDURE — 6360000002 HC RX W HCPCS: Performed by: EMERGENCY MEDICINE

## 2025-06-17 PROCEDURE — 84484 ASSAY OF TROPONIN QUANT: CPT

## 2025-06-17 PROCEDURE — 82607 VITAMIN B-12: CPT

## 2025-06-17 PROCEDURE — 86901 BLOOD TYPING SEROLOGIC RH(D): CPT

## 2025-06-17 PROCEDURE — 2500000003 HC RX 250 WO HCPCS

## 2025-06-17 PROCEDURE — 99222 1ST HOSP IP/OBS MODERATE 55: CPT | Performed by: SURGERY

## 2025-06-17 PROCEDURE — 84520 ASSAY OF UREA NITROGEN: CPT

## 2025-06-17 PROCEDURE — 82306 VITAMIN D 25 HYDROXY: CPT

## 2025-06-17 PROCEDURE — 2000000000 HC ICU R&B

## 2025-06-17 PROCEDURE — 72125 CT NECK SPINE W/O DYE: CPT

## 2025-06-17 PROCEDURE — 74176 CT ABD & PELVIS W/O CONTRAST: CPT

## 2025-06-17 PROCEDURE — 85347 COAGULATION TIME ACTIVATED: CPT

## 2025-06-17 PROCEDURE — 84439 ASSAY OF FREE THYROXINE: CPT

## 2025-06-17 PROCEDURE — 85025 COMPLETE CBC W/AUTO DIFF WBC: CPT

## 2025-06-17 PROCEDURE — 82947 ASSAY GLUCOSE BLOOD QUANT: CPT

## 2025-06-17 PROCEDURE — 99285 EMERGENCY DEPT VISIT HI MDM: CPT

## 2025-06-17 PROCEDURE — 2580000003 HC RX 258: Performed by: EMERGENCY MEDICINE

## 2025-06-17 PROCEDURE — 96374 THER/PROPH/DIAG INJ IV PUSH: CPT

## 2025-06-17 PROCEDURE — 82040 ASSAY OF SERUM ALBUMIN: CPT

## 2025-06-17 PROCEDURE — 6360000002 HC RX W HCPCS

## 2025-06-17 PROCEDURE — 82805 BLOOD GASES W/O2 SATURATION: CPT

## 2025-06-17 PROCEDURE — 70450 CT HEAD/BRAIN W/O DYE: CPT

## 2025-06-17 PROCEDURE — 85610 PROTHROMBIN TIME: CPT

## 2025-06-17 PROCEDURE — 6810039001 HC L1 TRAUMA PRIORITY

## 2025-06-17 PROCEDURE — 71045 X-RAY EXAM CHEST 1 VIEW: CPT

## 2025-06-17 PROCEDURE — 6370000000 HC RX 637 (ALT 250 FOR IP)

## 2025-06-17 PROCEDURE — 82565 ASSAY OF CREATININE: CPT

## 2025-06-17 PROCEDURE — 86850 RBC ANTIBODY SCREEN: CPT

## 2025-06-17 PROCEDURE — 85390 FIBRINOLYSINS SCREEN I&R: CPT

## 2025-06-17 PROCEDURE — 83874 ASSAY OF MYOGLOBIN: CPT

## 2025-06-17 PROCEDURE — 85384 FIBRINOGEN ACTIVITY: CPT

## 2025-06-17 PROCEDURE — 84703 CHORIONIC GONADOTROPIN ASSAY: CPT

## 2025-06-17 PROCEDURE — 86900 BLOOD TYPING SEROLOGIC ABO: CPT

## 2025-06-17 PROCEDURE — 82550 ASSAY OF CK (CPK): CPT

## 2025-06-17 PROCEDURE — 85027 COMPLETE CBC AUTOMATED: CPT

## 2025-06-17 PROCEDURE — 83036 HEMOGLOBIN GLYCOSYLATED A1C: CPT

## 2025-06-17 PROCEDURE — 80051 ELECTROLYTE PANEL: CPT

## 2025-06-17 RX ORDER — SODIUM CHLORIDE 9 MG/ML
INJECTION, SOLUTION INTRAVENOUS PRN
Status: DISCONTINUED | OUTPATIENT
Start: 2025-06-17 | End: 2025-06-18

## 2025-06-17 RX ORDER — POLYETHYLENE GLYCOL 3350 17 G/17G
17 POWDER, FOR SOLUTION ORAL DAILY
Status: DISCONTINUED | OUTPATIENT
Start: 2025-06-17 | End: 2025-06-19 | Stop reason: HOSPADM

## 2025-06-17 RX ORDER — LEVOTHYROXINE SODIUM 88 UG/1
88 TABLET ORAL EVERY MORNING
Status: DISCONTINUED | OUTPATIENT
Start: 2025-06-18 | End: 2025-06-19 | Stop reason: HOSPADM

## 2025-06-17 RX ORDER — ONDANSETRON 4 MG/1
4 TABLET, ORALLY DISINTEGRATING ORAL EVERY 8 HOURS PRN
Status: DISCONTINUED | OUTPATIENT
Start: 2025-06-17 | End: 2025-06-19 | Stop reason: HOSPADM

## 2025-06-17 RX ORDER — ONDANSETRON 2 MG/ML
4 INJECTION INTRAMUSCULAR; INTRAVENOUS EVERY 6 HOURS PRN
Status: DISCONTINUED | OUTPATIENT
Start: 2025-06-17 | End: 2025-06-19 | Stop reason: HOSPADM

## 2025-06-17 RX ORDER — SODIUM CHLORIDE 9 MG/ML
50 INJECTION, SOLUTION INTRAVENOUS ONCE
Status: COMPLETED | OUTPATIENT
Start: 2025-06-17 | End: 2025-06-17

## 2025-06-17 RX ORDER — CARVEDILOL 6.25 MG/1
3.12 TABLET ORAL 2 TIMES DAILY
Status: DISCONTINUED | OUTPATIENT
Start: 2025-06-17 | End: 2025-06-19 | Stop reason: HOSPADM

## 2025-06-17 RX ORDER — FAMOTIDINE 20 MG/1
20 TABLET, FILM COATED ORAL DAILY
Status: DISCONTINUED | OUTPATIENT
Start: 2025-06-17 | End: 2025-06-18

## 2025-06-17 RX ORDER — ASPIRIN 81 MG/1
81 TABLET, CHEWABLE ORAL DAILY
Status: DISCONTINUED | OUTPATIENT
Start: 2025-06-17 | End: 2025-06-18

## 2025-06-17 RX ORDER — SODIUM CHLORIDE 0.9 % (FLUSH) 0.9 %
5-40 SYRINGE (ML) INJECTION EVERY 12 HOURS SCHEDULED
Status: DISCONTINUED | OUTPATIENT
Start: 2025-06-17 | End: 2025-06-18

## 2025-06-17 RX ORDER — LEVETIRACETAM 500 MG/1
500 TABLET ORAL 2 TIMES DAILY
Status: DISCONTINUED | OUTPATIENT
Start: 2025-06-17 | End: 2025-06-18

## 2025-06-17 RX ORDER — LIDOCAINE 4 G/G
1 PATCH TOPICAL DAILY
Status: DISCONTINUED | OUTPATIENT
Start: 2025-06-17 | End: 2025-06-19 | Stop reason: HOSPADM

## 2025-06-17 RX ORDER — LABETALOL HYDROCHLORIDE 5 MG/ML
10 INJECTION, SOLUTION INTRAVENOUS EVERY 6 HOURS PRN
Status: DISCONTINUED | OUTPATIENT
Start: 2025-06-17 | End: 2025-06-18

## 2025-06-17 RX ORDER — HYDRALAZINE HYDROCHLORIDE 20 MG/ML
10 INJECTION INTRAMUSCULAR; INTRAVENOUS EVERY 6 HOURS PRN
Status: DISCONTINUED | OUTPATIENT
Start: 2025-06-17 | End: 2025-06-18

## 2025-06-17 RX ORDER — HYDRALAZINE HYDROCHLORIDE 20 MG/ML
INJECTION INTRAMUSCULAR; INTRAVENOUS DAILY PRN
Status: COMPLETED | OUTPATIENT
Start: 2025-06-17 | End: 2025-06-17

## 2025-06-17 RX ORDER — ACETAMINOPHEN 500 MG
1000 TABLET ORAL EVERY 8 HOURS SCHEDULED
Status: DISCONTINUED | OUTPATIENT
Start: 2025-06-17 | End: 2025-06-19 | Stop reason: HOSPADM

## 2025-06-17 RX ORDER — ALBUTEROL SULFATE 90 UG/1
1 INHALANT RESPIRATORY (INHALATION) EVERY 4 HOURS PRN
Status: DISCONTINUED | OUTPATIENT
Start: 2025-06-17 | End: 2025-06-18

## 2025-06-17 RX ORDER — OXYCODONE HYDROCHLORIDE 5 MG/1
2.5 TABLET ORAL EVERY 4 HOURS PRN
Refills: 0 | Status: DISCONTINUED | OUTPATIENT
Start: 2025-06-17 | End: 2025-06-19 | Stop reason: HOSPADM

## 2025-06-17 RX ORDER — SODIUM CHLORIDE 0.9 % (FLUSH) 0.9 %
5-40 SYRINGE (ML) INJECTION PRN
Status: DISCONTINUED | OUTPATIENT
Start: 2025-06-17 | End: 2025-06-19 | Stop reason: HOSPADM

## 2025-06-17 RX ADMIN — SODIUM CHLORIDE, PRESERVATIVE FREE 10 ML: 5 INJECTION INTRAVENOUS at 20:51

## 2025-06-17 RX ADMIN — OXYCODONE 2.5 MG: 5 TABLET ORAL at 21:07

## 2025-06-17 RX ADMIN — FAMOTIDINE 20 MG: 20 TABLET, FILM COATED ORAL at 16:21

## 2025-06-17 RX ADMIN — HYDRALAZINE HYDROCHLORIDE 10 MG: 20 INJECTION INTRAMUSCULAR; INTRAVENOUS at 16:21

## 2025-06-17 RX ADMIN — LEVETIRACETAM 500 MG: 500 TABLET, FILM COATED ORAL at 20:50

## 2025-06-17 RX ADMIN — ACETAMINOPHEN 1000 MG: 500 TABLET, FILM COATED ORAL at 16:29

## 2025-06-17 RX ADMIN — Medication 10 MG: at 21:04

## 2025-06-17 RX ADMIN — OXYCODONE 2.5 MG: 5 TABLET ORAL at 16:28

## 2025-06-17 RX ADMIN — HYDRALAZINE HYDROCHLORIDE 10 MG: 20 INJECTION, SOLUTION INTRAMUSCULAR; INTRAVENOUS at 13:00

## 2025-06-17 RX ADMIN — SODIUM CHLORIDE 50 ML: 0.9 INJECTION, SOLUTION INTRAVENOUS at 11:50

## 2025-06-17 RX ADMIN — ACETAMINOPHEN 1000 MG: 500 TABLET, FILM COATED ORAL at 21:01

## 2025-06-17 RX ADMIN — SODIUM CHLORIDE, PRESERVATIVE FREE 10 ML: 5 INJECTION INTRAVENOUS at 16:21

## 2025-06-17 RX ADMIN — PROTHROMBIN COMPLEX CONCENTRATE (HUMAN) 2000 UNITS: 25.5; 16.5; 24; 22; 22; 26 POWDER, FOR SOLUTION INTRAVENOUS at 11:48

## 2025-06-17 RX ADMIN — CEPHALEXIN 250 MG: 250 CAPSULE ORAL at 21:01

## 2025-06-17 RX ADMIN — SODIUM CHLORIDE, PRESERVATIVE FREE 10 ML: 5 INJECTION INTRAVENOUS at 21:04

## 2025-06-17 RX ADMIN — CARVEDILOL 3.12 MG: 6.25 TABLET, FILM COATED ORAL at 20:50

## 2025-06-17 ASSESSMENT — PAIN DESCRIPTION - LOCATION
LOCATION: BACK;HEAD
LOCATION: HEAD
LOCATION: PELVIS;OTHER (COMMENT)

## 2025-06-17 ASSESSMENT — PAIN - FUNCTIONAL ASSESSMENT
PAIN_FUNCTIONAL_ASSESSMENT: 0-10
PAIN_FUNCTIONAL_ASSESSMENT: ACTIVITIES ARE NOT PREVENTED

## 2025-06-17 ASSESSMENT — ENCOUNTER SYMPTOMS
PHOTOPHOBIA: 0
ROS SKIN COMMENTS: SKIN TEAR TO RIGHT FOREARM
BACK PAIN: 1
RHINORRHEA: 0
CHEST TIGHTNESS: 0
SHORTNESS OF BREATH: 0
VOMITING: 0
WHEEZING: 0
NAUSEA: 0

## 2025-06-17 ASSESSMENT — LIFESTYLE VARIABLES
HOW OFTEN DO YOU HAVE A DRINK CONTAINING ALCOHOL: NEVER
HOW OFTEN DO YOU HAVE A DRINK CONTAINING ALCOHOL: NEVER
HOW MANY STANDARD DRINKS CONTAINING ALCOHOL DO YOU HAVE ON A TYPICAL DAY: PATIENT DOES NOT DRINK
HOW MANY STANDARD DRINKS CONTAINING ALCOHOL DO YOU HAVE ON A TYPICAL DAY: PATIENT DOES NOT DRINK

## 2025-06-17 ASSESSMENT — PAIN DESCRIPTION - DESCRIPTORS
DESCRIPTORS: ACHING;DISCOMFORT;SPASM
DESCRIPTORS: ACHING;SHARP
DESCRIPTORS: ACHING;DISCOMFORT

## 2025-06-17 ASSESSMENT — PAIN SCALES - GENERAL
PAINLEVEL_OUTOF10: 8
PAINLEVEL_OUTOF10: 7
PAINLEVEL_OUTOF10: 5
PAINLEVEL_OUTOF10: 7

## 2025-06-17 ASSESSMENT — PAIN DESCRIPTION - ORIENTATION: ORIENTATION: LOWER

## 2025-06-17 NOTE — ED PROVIDER NOTES
[facility]- S[facility]  Emergency Department Encounter  Emergency Medicine Resident     Pt Name:Monalisa Mcdowell  MRN: [id number]  Birthdate 1945  Date of evaluation: 6/17/25  PCP:  Duane Oneil MD  Note Started: 7:01 PM EDT      CHIEF COMPLAINT       Chief Complaint   Patient presents with    Fall       HISTORY OF PRESENT ILLNESS  (Location/Symptom, Timing/Onset, Context/Setting, Quality, Duration, Modifying Factors, Severity.)      Monalisa Mcdowell is a 80 y.o. female who presents with as a transfer from [facility] where she was seen due to fall.  Patient fell on Saturday when she was trying to turn on her shower, falling backwards and striking her head.  No reported loss of consciousness but patient is on Eliquis and has been taking it regularly since then.  Last dose was this morning.  Patient did receive PCC at [facility].  Was found to have an intracranial bleed and negative C-spine CT.  Patient reports that she has some lower back pain in addition to headache that has been persistent but otherwise feels well and is alert and oriented and able to answer questions.    PAST MEDICAL / SURGICAL / SOCIAL / FAMILY HISTORY      has a past medical history of Acid reflux, Hyperlipidemia, Hypertension, and Thyroid disease.     has a past surgical history that includes Tonsillectomy; Cholecystectomy; Ovary removal; Appendectomy; Dilation and curettage of uterus; Colonoscopy; Urethral sling; and Cystoscopy.    Social History     Socioeconomic History    Marital status:      Spouse name: Not on file    Number of children: Not on file    Years of education: Not on file    Highest education level: Not on file   Occupational History    Not on file   Tobacco Use    Smoking status: Never    Smokeless tobacco: Never   Substance and Sexual Activity    Alcohol use: Not Currently    Drug use: Not on file    Sexual activity: Defer     Comment: Declined to answer   Other Topics Concern    Not on file   Social  procedures.    FINAL IMPRESSION      1. SDH (subdural hematoma) (HCC)          DISPOSITION / PLAN     DISPOSITION Admitted 06/17/2025 01:39:10 PM               PATIENT REFERRED TO:  No follow-up provider specified.    DISCHARGE MEDICATIONS:  Current Discharge Medication List          Ksenia Franco MD  Emergency Medicine Resident    (Please note that portions of thisnote were completed with a voice recognition program.  Efforts were made to edit the dictations but occasionally words are mis-transcribed.)

## 2025-06-17 NOTE — PROGRESS NOTES
Trauma Tertiary Survey    Admit Date: 6/17/2025  Hospital day 0      Chief Complaint: \"I fell\"    Subjective:     81 yo F with PMHx of atrial fibrillation on Eliquis who presented after a mechanical fall in the bathroom, found to have trace left SDH.    Objective:   PHYSICAL EXAM:   HENT:      Head: Normocephalic.   Eyes:      Pupils: Pupils are equal, round, and reactive to light.   Cardiovascular:      Rate and Rhythm: Normal rate.   Pulmonary:      Effort: Pulmonary effort is normal.   Abdominal:      Palpations: Abdomen is soft.      Tenderness: There is no abdominal tenderness.   Musculoskeletal:         General: Normal range of motion.      Cervical back: Normal range of motion. No bony tenderness.      Thoracic back: No bony tenderness.      Lumbar back: Bony tenderness present.      Right lower leg: No edema.      Left lower leg: No edema.      Comments: Minimal midline lumbar tenderness without stepoff or deformity.   Skin:     General: Skin is warm and dry.   Neurological:      General: No focal deficit present.      Mental Status: She is alert and oriented to person, place, and time.      Cranial Nerves: Cranial nerves 2-12 are intact.      Sensory: Sensation is intact.      Motor: Motor function is intact.      Comments: No focal neurological deficit.    Spine:     Spine Tenderness ROM   Cervical 0 /10 Normal   Thoracic 0 /10 Normal   Lumbar 4 /10 Normal     Musculoskeletal    Joint Tenderness Swelling ROM   Right shoulder absent absent normal   Left shoulder absent absent normal   Right elbow absent absent normal   Left elbow absent absent normal   Right wrist absent absent normal   Left wrist absent absent normal   Right hand grasp absent absent normal   Left hand grasp absent absent normal   Right hip absent absent normal   Left hip absent absent normal   Right knee absent absent normal   Left knee absent absent normal   Right ankle absent absent normal   Left ankle absent absent normal   Right foot

## 2025-06-17 NOTE — ED PROVIDER NOTES
[facility] Emergency Department  EMERGENCY DEPARTMENT ENCOUNTER      Pt Name: Monalisa Mcdowell  MRN: [id number]  Birthdate 1945  Date of evaluation: 6/17/2025    CHIEF COMPLAINT       Chief Complaint   Patient presents with    Fall     Had a fall on Sunday. Pt states she fell from standing position to the ground. Pt did hit head and back. Pain has gotten worse in back. Now is experiencing headache and nausea.         HISTORY OF PRESENT ILLNESS      Monalisa Mcdowell is a 80 y.o. female who presents to the emergency department following a mechanical fall in the bathtub on Saturday.  She fell backwards after trying to twist the knob for the shower when she fell backwards striking her head.  She denies any neck pain.  No numbness or weakness.  She does have a shunt and her doctor was concerned about it malfunctioning.  She denies any headache she admits to head pressure and feeling nauseated.  Denies any hip pain.  No chest pain or shortness of breath no preceding symptoms she is simply lost her balance falling backwards.  She rates her pain 7 out of 10.  She is not on any anticoagulation.     REVIEW OF SYSTEMS       CONSTITUTIONAL: No fevers or chills   HENT: No sore throat, rhinorrhea, or earache   EYES: No blurry vision or double vision no drainage   CARDIOVASCULAR: No chest pain or tachycardia   RESPIRATORY: No wheezing or shortness of breath no cough   GASTROINTESTINAL: Positive nausea, no vomiting, diarrhea, constipation, or abdominal pain   : No hematuria or dysuria   MUSCULOSKELETAL: No swelling or pain   SKIN: No rash   NEUROLOGICAL: No focal neurologic complaints, paresthesias, weakness, or headache positive head pressure     PAST MEDICAL HISTORY    has a past medical history of Acid reflux, Hyperlipidemia, Hypertension, and Thyroid disease.    SURGICAL HISTORY      has a past surgical history that includes Tonsillectomy; Cholecystectomy; Ovary removal; Appendectomy; Dilation and curettage of uterus;

## 2025-06-17 NOTE — ED NOTES
ED to inpatient nurses report      Chief Complaint:  Chief Complaint   Patient presents with    Fall     Present to ED from: Transfer from [facility]     MOA:     LOC: alert and orientated to name, place, date  Mobility: Independent  Oxygen Baseline: >95% on room air     Current needs required: none   Pending ED orders: none  Present condition: stable     Why did the patient come to the ED? Pt to ED via transfer from [place]. Pt slipped on mat getting out of shower, fell backwards and hit head on floor. Pt denies LOC. Pt received CT head[facility]neck at [facility]   What is the plan? Admit, monitor   Any procedures or intervention occur? Hydralazine   Any safety concerns??    Mental Status:       Psych Assessment:      Vital signs   Vitals:    06/17/25 1300 06/17/25 1302 06/17/25 1307 06/17/25 1329   BP: (!) 204/169   (!) 149/108   Pulse: 76   81   Resp: 17   21   Temp:  97.6 °F (36.4 °C)     SpO2: 98%      Weight:   52.2 kg (115 lb)    Height:   1.651 m (5' 5\")         Vitals:  Patient Vitals for the past 24 hrs:   BP Temp Pulse Resp SpO2 Height Weight   06/17/25 1329 (!) 149/108 -- 81 21 -- -- --   06/17/25 1307 -- -- -- -- -- 1.651 m (5' 5\") 52.2 kg (115 lb)   06/17/25 1302 -- 97.6 °F (36.4 °C) -- -- -- -- --   06/17/25 1300 (!) 204/169 -- 76 17 98 % -- --   06/17/25 1258 (!) 214/81 -- 75 21 98 % -- --   06/17/25 1256 (!) 222/89 -- 79 17 98 % -- --      Visit Vitals  BP (!) 149/108   Pulse 81   Temp 97.6 °F (36.4 °C)   Resp 21   Ht 1.651 m (5' 5\")   Wt 52.2 kg (115 lb)   SpO2 98%   BMI 19.14 kg/m²        LDAs:   Peripheral IV 06/17/25 Left Forearm (Active)       Ambulatory Status:  Presents to emergency department  because of falls (Syncope, seizure, or loss of consciousness): Yes, Age > 70: Yes, Altered Mental Status, Intoxication with alcohol or substance confusion (Disorientation, impaired judgment, poor safety awaremess, or inability to follow instructions): No, Impaired Mobility: Ambulates or transfers

## 2025-06-17 NOTE — ED PROVIDER NOTES
[facility] EMERGENCY DEPARTMENT     Emergency Department     Faculty Attestation        I performed a history and physical examination of the patient and discussed management with the resident. I reviewed the resident’s note and agree with the documented findings and plan of care. Any areas of disagreement are noted on the chart. I was personally present for the key portions of any procedures. I have documented in the chart those procedures where I was not present during the key portions. I have reviewed the emergency nurses triage note. I agree with the chief complaint, past medical history, past surgical history, allergies, medications, social and family history as documented unless otherwise noted below.    For mid-level providers such as nurse practitioners as well as physicians assistants:    I have personally seen and evaluated the patient.    I find the patient's history and physical exam are consistent with NP/PA documentation.  I agree with the care provided, treatment rendered, disposition, & follow-up plan.     Additional findings are as noted.    Vital Signs: BP (!) 222/89   Pulse 75   Resp 21   SpO2 98%   PCP:  Duane Oneil MD  Note Started: 1:00 PM EDT    Pertinent Comments:           Critical Care  None          Sal Craft MD    Attending Emergency Medicine Physician            Rocco Craft MD  06/17/25 1300

## 2025-06-17 NOTE — PROGRESS NOTES
[facility]  PROGRESS NOTE    Shift date: 6/17/2025  Shift day: Tuesday   Shift # 1    Room # 1025/1025-01   Name: Monalisa Mcdowell                Sikhism:    Place of Zoroastrian:     Referral: Chaplain Montalvo    Admit Date & Time: 6/17/2025 12:45 PM    Assessment:  Monalisa Mcdowell is a 80 y.o. female in the hospital . Upon entering the room writer observes patient awake in hospital bed, patient was calm and coping, patient was engaged on phone with her son.      Intervention:  Writer introduced self and title as  Writer offered space for patient  to express feelings, needs, and concerns and provided a ministry presence.  provided spiritual support through prayer and active listening.    Outcome:  Patient was receptive to visit and engaged in conversation.    Plan:  Chaplains will remain available to offer spiritual and emotional support as needed.      Electronically signed by Chaplain RICKY, on 6/17/2025 at 3:53 PM.  [facility]  [phone]

## 2025-06-17 NOTE — PROGRESS NOTES
15 Variable Trauma-Specific Frailty Index   Comorbidities   Cancer History No (0)   Coronary Heart Disease No(0)   Dementia No (0)   Daily Activities   Help With Grooming No (0)   Help with Managing Money No (0)   Help doing Housework No (0)   Help with Toileting No (0)   Help Walking No (0)   Health Attitude   Feel Less Useful Never (0)   Feel Sad Never (0)   Feel Effort to Do Everything Sometimes (0.5)   Feels Lonely Never (0)   Falls Sometimes (0.5)   Function   Sexually Active no(1)   Nutrition   Albumin > 3 g/dL (0)   Scoring   Score  2   *Based on 2-weeks prior to hospital admission   Trauma Specific Fraility Index > or = to 4 (4/15 = 0.26)  Trauma Specific Fraility Index Score:    0.13      Not Frail

## 2025-06-17 NOTE — H&P
TRAUMA H&P/CONSULT    PATIENT NAME: Monalisa Mcdowell  YOB: 1945  MEDICAL RECORD NO. [id number]   DATE: 6/17/2025  PRIMARY CARE PHYSICIAN: Duane Oneil MD  PATIENT EVALUATED AT THE REQUEST OF : Luana GREY   Trauma Priority      IMPRESSION AND PLAN:       Diagnosis: trace left SHD, on Eliquis, BIG 3   Plan: Neurosurgery consulted, ICU admission, Neuro checks    Diagnosis: midline lumbar tenderness, scoliosis on scans   Plan: Neurosurgery to eval, f/u spinal recons    If intracranial hemorrhage is present, is it a:  [] BIG 1  [] BIG 2  [] BIG 3  If chest wall injury: Rib score___    CONSULT SERVICES    Neurosurgery      HISTORY:     Chief Complaint:  \"Tipped over in shower\"    GENERAL DATA  Patient information was obtained from patient, EMS personnel, and past medical records.  History/Exam limitations: none.  Injury Date: 6/14   Approximate Injury Time: overnight        Transport mode: Ambulance  Referring Hospital: [facility]    SETTING OF TRAUMATIC EVENT   Location : Home  Specific Details of Location: Bathroom    MECHANISM OF INJURY    Fall From standing      HISTORY:     Monalisa Mcdowell is a female that presented to the Emergency Department following a fall from standing height on the night of 6/14 (2-3 days ago).  She reports that she was adjusting water temperature in the shower while standing outside of the tub, and when she stood up straight, she kept going backwards and landed on her back and hit her head on the vanity behind her.  She denies syncope or loss of consciousness.  She has had a headache and back pain since then.  She reports that she knows she has scoliosis, but she is experiencing new, sharp low back pain.  She has continued to take her Eliquis.  Her last dose was this morning at 7 AM.  She presented to the [facility] ER for headache/head pain.  At [facility], her Eliquis was reversed with PCC.  CT was obtained of head and cervical spine which demonstrated a trace  10/31/24   Duane Oneil MD   medicated lip balm (BLISTEX/CARMEX) 2-2.5-6.6 % STCK Apply topically as needed for Dry Lips 8/25/24   Iwona Moscoso, DO   fluticasone (FLONASE) 50 MCG/ACT nasal spray 1 spray by Each Nostril route daily 8/25/24   Iwona Moscoso, DO   carboxymethylcellulose PF (THERATEARS/REFRESH) 1 % GEL ophthalmic gel Place 2 drops into both eyes 3 times daily 8/25/24   Iwona Moscoso, DO   benzocaine (HURRICAINE) 20 % AERO oral spray Mouth/Throat, 4 TIMES DAILY PRN, Pain, Starting on Thu 8/22/24 at 0824   Instructions:    Firmly insert provided extension tube into spray can valve. Hold extension tube 1-2 inches from affected area and spray for ½ second (press and immediately release actuator). 8/25/24   Iwona Moscoso, DO   buPROPion (WELLBUTRIN XL) 150 MG extended release tablet Take 1 tablet by mouth every morning    Chad Pacheco MD   Lifitegrast (XIIDRA) 5 % SOLN Apply to eye    Chad Pacheco MD   pantoprazole (PROTONIX) 40 MG tablet Take 1 tablet by mouth every morning (before breakfast)    Chad Pacheco MD   citalopram (CELEXA) 10 MG tablet Take 3 tablets by mouth daily 5/15/21   Chad Pacheco MD   albuterol sulfate HFA (PROVENTIL;VENTOLIN;PROAIR) 108 (90 Base) MCG/ACT inhaler Inhale into the lungs as needed for Wheezing or Shortness of Breath    Chad Pacheco MD   metoprolol tartrate (LOPRESSOR) 25 MG tablet TAKE ONE TABLET BY MOUTH DAILY WITH FOOD 3/13/24   Duane Oneil MD   nortriptyline (PAMELOR) 50 MG capsule Take 2 capsules by mouth nightly    Chad Pacheco MD   aspirin 81 MG tablet Take 1 tablet by mouth daily    Chad Pacheco MD   calcium-vitamin D (OSCAL-500) 500-200 MG-UNIT per tablet Take 1 tablet by mouth daily    Chad Pacheco MD       ALLERGIES:   []  None    []   Information not available due to exam limitations documented above     Bacid, Codeine, Codeine, Corn oil, Formaldehyde, Mobic [meloxicam], Morphine,

## 2025-06-17 NOTE — CONSULTS
Notes               Department of Neurosurgery                                            Nurse Practitioner Consult Note      Reason for Consult:  fall left side subdural hematoma   Requesting Physician:  Trauma   Neurosurgeon:   [] Dr. Izquierdo  [] Dr. Grigsby  [] Dr. Tomlinson  [x] Dr. Milner      History Obtained From:  patient    CHIEF COMPLAINT:         Chief Complaint   Patient presents with    Fall       HISTORY OF PRESENT ILLNESS:       The patient is a 80 y.o. female who has history of A fib and on Eliquis who presents as transfer from [place] after sustaining a fall on 6/14. She received PCC while at [place]. She had a CT head completed showing acute left side subdural hematoma. She reports headache holocranial. Denies nausea and vomiting  PAST MEDICAL HISTORY :       Past Medical History:        Diagnosis Date    Acid reflux     Hyperlipidemia     Hypertension     Thyroid disease        Past Surgical History:        Procedure Laterality Date    APPENDECTOMY      CHOLECYSTECTOMY      COLONOSCOPY      CYSTOSCOPY      DILATION AND CURETTAGE OF UTERUS      OVARY REMOVAL      TONSILLECTOMY      URETHRAL SLING         Social History:   Social History     Socioeconomic History    Marital status:      Spouse name: Not on file    Number of children: Not on file    Years of education: Not on file    Highest education level: Not on file   Occupational History    Not on file   Tobacco Use    Smoking status: Never    Smokeless tobacco: Never   Substance and Sexual Activity    Alcohol use: Not Currently    Drug use: Not on file    Sexual activity: Defer     Comment: Declined to answer   Other Topics Concern    Not on file   Social History Narrative    ** Merged History Encounter **          Social Drivers of Health     Financial Resource Strain: Low Risk  (11/6/2024)    Overall Financial Resource Strain (CARDIA)     Difficulty of Paying Living Expenses: Not hard at all   Food Insecurity: No Food Insecurity  (11/6/2024)    Hunger Vital Sign     Worried About Running Out of Food in the Last Year: Never true     Ran Out of Food in the Last Year: Never true   Transportation Needs: No Transportation Needs (11/6/2024)    PRAPARE - Transportation     Lack of Transportation (Medical): No     Lack of Transportation (Non-Medical): No   Physical Activity: Inactive (4/2/2025)    Exercise Vital Sign     Days of Exercise per Week: 0 days     Minutes of Exercise per Session: 0 min   Stress: Not on file   Social Connections: Not on file   Intimate Partner Violence: Unknown (2/22/2024)    Received from [facility]     Fear of Current or Ex-Partner: Not on file     Emotionally Abused: Not on file     Physically Abused: Not on file     Sexually Abused: Not on file     Physically or Sexually Abused: Not on file   Housing Stability: Low Risk  (11/6/2024)    Housing Stability Vital Sign     Unable to Pay for Housing in the Last Year: No     Number of Times Moved in the Last Year: 1     Homeless in the Last Year: No       Family History:       Problem Relation Age of Onset    High Blood Pressure Mother     High Blood Pressure Father     Diabetes Sister     High Blood Pressure Sister     Breast Cancer Sister     Diabetes Brother     High Blood Pressure Brother     Cancer Brother     Heart Disease Maternal Grandfather     Heart Disease Paternal Grandmother     Heart Disease Paternal Grandfather        Allergies:  Bacid, Codeine, Codeine, Corn oil, Formaldehyde, Mobic [meloxicam], Morphine, Nitrofurantoin, Sulfa antibiotics, and Sulfa antibiotics    Home Medications:  Prior to Admission medications    Medication Sig Start Date End Date Taking? Authorizing Provider   cephALEXin (KEFLEX) 500 MG capsule Take 1 capsule by mouth 2 times daily for 7 days 6/15/25 6/22/25  Alexis Fernandez MD   famotidine (PEPCID) 20 MG tablet Take 1 tablet by mouth 2 times daily 4/24/25   Clarice

## 2025-06-17 NOTE — ED NOTES
Blood drawn by HIGINIO Bhagat, given to , [facility], labeled and sent to lab

## 2025-06-17 NOTE — PROGRESS NOTES
Pharmacy Note     Renal Dose Adjustment    Monalisa Mcdowell is a 80 y.o. female. Pharmacist assessment of renally cleared medications.    Recent Labs     06/17/25  1138 06/17/25  1259   BUN 19 19       Recent Labs     06/17/25  1138 06/17/25  1259   CREATININE 1.5* 1.5*       Estimated Creatinine Clearance: 24 mL/min (A) (based on SCr of 1.5 mg/dL (H)).    Height:   Ht Readings from Last 1 Encounters:   06/17/25 1.651 m (5' 5\")     Weight:  Wt Readings from Last 1 Encounters:   06/17/25 52.2 kg (115 lb)       The following medication dose has been adjusted based upon renal function per P&T Guidelines:             Famotidine 20mg twice daily was changed to  Famotidine 20mg once daily    Cephalexin 500mg q12hrs was changed to   Cephalexin 250mg q12hrs

## 2025-06-17 NOTE — ED NOTES
Monalisa Mcdowell  SDH - hx  shunt  Eliquis for afib, BP is stable   Request for reversal, will discuss with nsx

## 2025-06-17 NOTE — CARE COORDINATION
Case Management Assessment  Initial Evaluation    Date/Time of Evaluation: 6/17/2025 5:07 PM  Assessment Completed by: RAUL CARRINGTON RN    If patient is discharged prior to next notation, then this note serves as note for discharge by case management.    Patient Name: Monalisa Mcdowell                   YOB: 1945  Diagnosis: SDH (subdural hematoma) (HCC) [S06.5XAA]                   Date / Time: 6/17/2025 12:45 PM    Patient Admission Status: Inpatient   Readmission Risk (Low < 19, Mod (19-27), High > 27): Readmission Risk Score: 14.1    Current PCP: Duane Oneil MD  PCP verified by CM? Yes    Chart Reviewed: Yes      History Provided by: Patient  Patient Orientation: Alert and Oriented    Patient Cognition: Alert    Hospitalization in the last 30 days (Readmission):  No    If yes, Readmission Assessment in CM Navigator will be completed.    Advance Directives:      Code Status: Full Code   Patient's Primary Decision Maker is: Named in Scanned ACP Document    Primary Decision Maker: Padilla Mcdowell - Anatoliy - [phone]    Discharge Planning:    Patient lives with: (P) Children Type of Home: (P) House  Primary Care Giver: Self  Patient Support Systems include: Children   Current Financial resources: (P) Medicare  Current community resources:    Current services prior to admission: (P) Durable Medical Equipment            Current DME: (P) Walker, Wheelchair, Shower Chair            Type of Home Care services:  (P) None    ADLS  Prior functional level: Assistance with the following:, Bathing, Cooking, Housework, Shopping  Current functional level: (P) Assistance with the following:, Bathing, Cooking, Housework, Shopping, Other (see comment) (Await PT/OT aly)    PT AM-PAC:   /24  OT AM-PAC:   /24    Family can provide assistance at DC: (P) Yes  Would you like Case Management to discuss the discharge plan with any other family members/significant others, and if so, who? (P) Yes (Son)  Plans to

## 2025-06-17 NOTE — PROGRESS NOTES
[facility]     Emergency/Trauma Note    PATIENT NAME: Monalisa Mcdowell    Shift date: 6/17/2025   Shift day: Tuesday   Shift # 1    Room # ANALILIA/ANALILIA   Name: Monalisa Mcdowell            Age: 80 y.o.  Gender: female          Shinto: Other Restoration  Place of Yazdanism:     Trauma/Incident type: Adult Trauma Priority  Admit Date & Time: 6/17/2025 12:45 PM    PATIENT/EVENT DESCRIPTION:  Monalisa Mcdowell is a 80 y.o. female who arrived as a transfer from [place]. Pt had fallen in the bathroom on Saturday and hit the back of her head.  Pt to be admitted to ANALILIA/ANALILIA.         SPIRITUAL ASSESSMENT-INTERVENTION-OUTCOME:  Pt appeared to be coping. She shared that she had fallen on Saturday but didn't go to the hospital until today because her daughter was having a family reunion on Saturday. Pt said she has two sons who are pastors. She is expecting some of her family to be here later. Writer provided support through active listening and words of affirmation.      PATIENT BELONGINGS:  No belongings noted    ANY BELONGINGS OF SIGNIFICANT VALUE NOTED:      REGISTRATION STAFF NOTIFIED?  No      WHAT IS YOUR SPIRITUAL CARE PLAN FOR THIS PATIENT?:   Spiritual health team will remain available for spiritual and emotional support.     Electronically signed by Chaplain Lidia, on 6/17/2025 at 2:59 PM.  [facility]  [phone]

## 2025-06-17 NOTE — PROGRESS NOTES
Called pharmacy to have them approve medications that had been entered upon patient arrival to unit. Patient's BP is high and writer requesting approval so that PRN meds could be given. Called and spoke to pharmacy at 1600. Awaiting medication approval.

## 2025-06-17 NOTE — PROGRESS NOTES
abdominal tenderness.   Musculoskeletal:         General: Normal range of motion.      Cervical back: Normal range of motion. No bony tenderness.      Thoracic back: No bony tenderness.      Lumbar back: Bony tenderness present.      Right lower leg: No edema.      Left lower leg: No edema.      Comments: Minimal midline lumbar tenderness without stepoff or deformity.   Skin:     General: Skin is warm and dry.   Neurological:      General: No focal deficit present.      Mental Status: She is alert and oriented to person, place, and time.      Cranial Nerves: Cranial nerves 2-12 are intact.      Sensory: Sensation is intact.      Motor: Motor function is intact.      Comments: No focal neurological deficit.        LAB:  CBC:   Recent Labs     06/17/25  1138 06/17/25  1333   WBC 9.9 9.5   HGB 12.7 12.8   HCT 39.8 40.4   MCV 86.0 89.0    192     BMP:   Recent Labs     06/17/25  1138 06/17/25  1259    138   K 3.9 3.8    105   CO2 22 21   BUN 19 19   CREATININE 1.5* 1.5*   GLUCOSE 84 84         RADIOLOGY:  CT THORACIC SPINE BONY RECONSTRUCTION  Result Date: 6/17/2025  Age-indeterminate compression deformity at T11. Multilevel degenerative change with bilateral foraminal narrowing at T11-12.     CT LUMBAR SPINE BONY RECONSTRUCTION  Result Date: 6/17/2025  Age-indeterminate compression deformity at L2 that may be acute/subacute. Levocurvature with multilevel degenerative change and associated foraminal narrowing on the left at L4-5.     CT CHEST ABDOMEN PELVIS WO CONTRAST Additional Contrast? None  Result Date: 6/17/2025  Age-indeterminate compression deformity at T11 and L2 that may be acute/subacute.  Correlation with MRI may be helpful in further characterization. Trace pericardial effusion. No acute abdominal or pelvic abnormality.     CT HEAD WO CONTRAST  Addendum Date: 6/17/2025  ADDENDUM: Critical results were called by Dr. Anna Jara to Dr. Zan Lua on 6/17/2025 at 10:52.     Result Date:  6/17/2025  1. Trace left subdural hematoma overlying the cerebral convexity measuring up to 3 mm in maximal thickness.  No midline shift. 2. Right frontal approach ventriculostomy catheter in place. 3.  Chronic microvascular disease with generalized cerebral volume loss.     CT CERVICAL SPINE WO CONTRAST  Addendum Date: 6/17/2025  ADDENDUM: Critical results were called by Dr. Anna Jara to Dr. Zan Lua on 6/17/2025 at 10:52.     Result Date: 6/17/2025  1. Trace left subdural hematoma overlying the cerebral convexity measuring up to 3 mm in maximal thickness.  No midline shift. 2. Right frontal approach ventriculostomy catheter in place. 3.  Chronic microvascular disease with generalized cerebral volume loss.       Taniya Adams MD  6/17/2025, 3:07 PM

## 2025-06-17 NOTE — ED NOTES
Pt to ED via transfer from [place]. Pt slipped on mat getting out of shower, fell  backwards and hit head on floor. Pt denies LOC. Pt received CT head/neck at [facility]

## 2025-06-18 ENCOUNTER — APPOINTMENT (OUTPATIENT)
Dept: CT IMAGING | Age: 80
DRG: 086 | End: 2025-06-18
Payer: MEDICARE

## 2025-06-18 ENCOUNTER — APPOINTMENT (OUTPATIENT)
Dept: MRI IMAGING | Age: 80
DRG: 086 | End: 2025-06-18
Payer: MEDICARE

## 2025-06-18 PROBLEM — I50.30 (HFPEF) HEART FAILURE WITH PRESERVED EJECTION FRACTION (HCC): Status: ACTIVE | Noted: 2025-06-18

## 2025-06-18 PROBLEM — N18.32 CKD STAGE 3B, GFR 30-44 ML/MIN (HCC): Status: ACTIVE | Noted: 2025-06-18

## 2025-06-18 PROBLEM — I27.20 MILD PULMONARY HYPERTENSION (HCC): Status: ACTIVE | Noted: 2025-06-18

## 2025-06-18 LAB
ANION GAP SERPL CALCULATED.3IONS-SCNC: 13 MMOL/L (ref 9–16)
BASOPHILS # BLD: 0.07 K/UL (ref 0–0.2)
BASOPHILS NFR BLD: 1 % (ref 0–2)
BUN SERPL-MCNC: 17 MG/DL (ref 8–23)
CALCIUM SERPL-MCNC: 9.1 MG/DL (ref 8.6–10.4)
CHLORIDE SERPL-SCNC: 105 MMOL/L (ref 98–107)
CO2 SERPL-SCNC: 20 MMOL/L (ref 20–31)
CREAT SERPL-MCNC: 1.4 MG/DL (ref 0.6–0.9)
EOSINOPHIL # BLD: 0.18 K/UL (ref 0–0.44)
EOSINOPHILS RELATIVE PERCENT: 2 % (ref 1–4)
ERYTHROCYTE [DISTWIDTH] IN BLOOD BY AUTOMATED COUNT: 12.9 % (ref 11.8–14.4)
GFR, ESTIMATED: 38 ML/MIN/1.73M2
GLUCOSE SERPL-MCNC: 106 MG/DL (ref 74–99)
HCT VFR BLD AUTO: 40.3 % (ref 36.3–47.1)
HGB BLD-MCNC: 12.5 G/DL (ref 11.9–15.1)
IMM GRANULOCYTES # BLD AUTO: 0.05 K/UL (ref 0–0.3)
IMM GRANULOCYTES NFR BLD: 1 %
LYMPHOCYTES NFR BLD: 1.81 K/UL (ref 1.1–3.7)
LYMPHOCYTES RELATIVE PERCENT: 20 % (ref 24–43)
MCH RBC QN AUTO: 28 PG (ref 25.2–33.5)
MCHC RBC AUTO-ENTMCNC: 31 G/DL (ref 28.4–34.8)
MCV RBC AUTO: 90.2 FL (ref 82.6–102.9)
MONOCYTES NFR BLD: 0.75 K/UL (ref 0.1–1.2)
MONOCYTES NFR BLD: 8 % (ref 3–12)
NEUTROPHILS NFR BLD: 68 % (ref 36–65)
NEUTS SEG NFR BLD: 6.37 K/UL (ref 1.5–8.1)
NRBC BLD-RTO: 0 PER 100 WBC
PLATELET # BLD AUTO: 194 K/UL (ref 138–453)
PMV BLD AUTO: 9.1 FL (ref 8.1–13.5)
POTASSIUM SERPL-SCNC: 4.1 MMOL/L (ref 3.7–5.3)
RBC # BLD AUTO: 4.47 M/UL (ref 3.95–5.11)
SODIUM SERPL-SCNC: 138 MMOL/L (ref 136–145)
WBC OTHER # BLD: 9.2 K/UL (ref 3.5–11.3)

## 2025-06-18 PROCEDURE — 92523 SPEECH SOUND LANG COMPREHEN: CPT

## 2025-06-18 PROCEDURE — 2500000003 HC RX 250 WO HCPCS

## 2025-06-18 PROCEDURE — 6370000000 HC RX 637 (ALT 250 FOR IP)

## 2025-06-18 PROCEDURE — 99222 1ST HOSP IP/OBS MODERATE 55: CPT | Performed by: NURSE PRACTITIONER

## 2025-06-18 PROCEDURE — 97166 OT EVAL MOD COMPLEX 45 MIN: CPT

## 2025-06-18 PROCEDURE — 97162 PT EVAL MOD COMPLEX 30 MIN: CPT

## 2025-06-18 PROCEDURE — 70450 CT HEAD/BRAIN W/O DYE: CPT

## 2025-06-18 PROCEDURE — 36415 COLL VENOUS BLD VENIPUNCTURE: CPT

## 2025-06-18 PROCEDURE — 97116 GAIT TRAINING THERAPY: CPT

## 2025-06-18 PROCEDURE — 97530 THERAPEUTIC ACTIVITIES: CPT

## 2025-06-18 PROCEDURE — 6370000000 HC RX 637 (ALT 250 FOR IP): Performed by: NURSE PRACTITIONER

## 2025-06-18 PROCEDURE — 1200000000 HC SEMI PRIVATE

## 2025-06-18 PROCEDURE — 6360000002 HC RX W HCPCS

## 2025-06-18 PROCEDURE — 80048 BASIC METABOLIC PNL TOTAL CA: CPT

## 2025-06-18 PROCEDURE — 72148 MRI LUMBAR SPINE W/O DYE: CPT

## 2025-06-18 PROCEDURE — 85025 COMPLETE CBC W/AUTO DIFF WBC: CPT

## 2025-06-18 PROCEDURE — 96127 BRIEF EMOTIONAL/BEHAV ASSMT: CPT | Performed by: SOCIAL WORKER

## 2025-06-18 PROCEDURE — 99232 SBSQ HOSP IP/OBS MODERATE 35: CPT | Performed by: SURGERY

## 2025-06-18 RX ORDER — FAMOTIDINE 20 MG/1
20 TABLET, FILM COATED ORAL DAILY
Status: DISCONTINUED | OUTPATIENT
Start: 2025-06-18 | End: 2025-06-19 | Stop reason: HOSPADM

## 2025-06-18 RX ORDER — BUPROPION HYDROCHLORIDE 150 MG/1
150 TABLET ORAL EVERY MORNING
Status: DISCONTINUED | OUTPATIENT
Start: 2025-06-18 | End: 2025-06-19 | Stop reason: HOSPADM

## 2025-06-18 RX ORDER — NORTRIPTYLINE HYDROCHLORIDE 25 MG/1
100 CAPSULE ORAL NIGHTLY
Status: DISCONTINUED | OUTPATIENT
Start: 2025-06-18 | End: 2025-06-19 | Stop reason: HOSPADM

## 2025-06-18 RX ADMIN — ACETAMINOPHEN 1000 MG: 500 TABLET, FILM COATED ORAL at 14:34

## 2025-06-18 RX ADMIN — FAMOTIDINE 20 MG: 20 TABLET, FILM COATED ORAL at 09:30

## 2025-06-18 RX ADMIN — LEVOTHYROXINE SODIUM 88 MCG: 0.09 TABLET ORAL at 09:30

## 2025-06-18 RX ADMIN — CITALOPRAM HYDROBROMIDE 30 MG: 20 TABLET ORAL at 09:29

## 2025-06-18 RX ADMIN — CEPHALEXIN 250 MG: 250 CAPSULE ORAL at 21:21

## 2025-06-18 RX ADMIN — OXYCODONE 2.5 MG: 5 TABLET ORAL at 01:08

## 2025-06-18 RX ADMIN — CEPHALEXIN 250 MG: 250 CAPSULE ORAL at 09:30

## 2025-06-18 RX ADMIN — HYDRALAZINE HYDROCHLORIDE 10 MG: 20 INJECTION INTRAMUSCULAR; INTRAVENOUS at 00:50

## 2025-06-18 RX ADMIN — NORTRIPTYLINE HYDROCHLORIDE 100 MG: 25 CAPSULE ORAL at 21:22

## 2025-06-18 RX ADMIN — BUPROPION HYDROCHLORIDE 150 MG: 150 TABLET, EXTENDED RELEASE ORAL at 09:31

## 2025-06-18 RX ADMIN — SODIUM CHLORIDE, PRESERVATIVE FREE 10 ML: 5 INJECTION INTRAVENOUS at 00:50

## 2025-06-18 RX ADMIN — CARVEDILOL 3.12 MG: 6.25 TABLET, FILM COATED ORAL at 21:22

## 2025-06-18 RX ADMIN — ACETAMINOPHEN 1000 MG: 500 TABLET, FILM COATED ORAL at 06:42

## 2025-06-18 RX ADMIN — CARVEDILOL 3.12 MG: 6.25 TABLET, FILM COATED ORAL at 09:30

## 2025-06-18 RX ADMIN — OXYCODONE 2.5 MG: 5 TABLET ORAL at 17:19

## 2025-06-18 RX ADMIN — ACETAMINOPHEN 1000 MG: 500 TABLET, FILM COATED ORAL at 21:21

## 2025-06-18 ASSESSMENT — PAIN SCALES - GENERAL
PAINLEVEL_OUTOF10: 4
PAINLEVEL_OUTOF10: 4
PAINLEVEL_OUTOF10: 7
PAINLEVEL_OUTOF10: 7

## 2025-06-18 ASSESSMENT — PAIN DESCRIPTION - ORIENTATION
ORIENTATION: LOWER
ORIENTATION: LOWER

## 2025-06-18 ASSESSMENT — PAIN DESCRIPTION - DESCRIPTORS
DESCRIPTORS: SHARP
DESCRIPTORS: SHARP
DESCRIPTORS: ACHING;DISCOMFORT

## 2025-06-18 ASSESSMENT — PAIN DESCRIPTION - LOCATION
LOCATION: HEAD
LOCATION: BACK
LOCATION: HEAD
LOCATION: BACK

## 2025-06-18 NOTE — TRANSITION OF CARE
Trauma/Surgical Critical Care Sign Out Note:       Code Status: DNR-CCA    Mode of provider to provider communication:        [] Via telephone   [] In person     Date and time of sign-out: 2025 11:43 AM     Criteria Met for Transfer:  [x]   Oxygen saturation is > 90% on FiO2< 50% (exceptions may be made for patient pathophysiology)    [x]   Vital signs remain at or near baseline without pharmaceutical adjuncts, blood products, or > 2L fluid bolus in the last 24 hours  [x]   No suspicion or evidence of a new untreated infection (confusion, cool or cyanotic extremities, poor capillary refill, metabolic acidosis, low urine output)  [x]   Stable GCS, seizures controlled, no invasive neurological monitoring   [x]   Altered mental status is stable and able to be safely managed outside the ICU  [x]   No deterioration in renal function in the last 24 hours (creatinine > 50% increase, new onset oliguria)  [x]   Patient care needs do not exceed the capabilities of the unit they are being transferred to (suctioning needs, glucose monitoring, neurological monitoring, neurovascular checks, vital sign monitoring, I&O monitoring, drain management  [x]   No longer requiring mechanical ventilation via endotracheal intubation and/or decreasing O2/CPAP requirements  [x]   No need for medications that cannot be administered outside the ICU  []   PIC score >6 (if applicable)      Reason for ICU admission:  BIG 3    Injuries:  Sdh age indetrminate thoracic compression fractures    ICU course summary:  Admit, repeated head ct scan improved, tolerated diet, gcs 15    Procedures during ICU stay:  none    Current vitals:  Temp: Temp: 97.7 °F (36.5 °C)Temp  Av.9 °F (36.6 °C)  Min: 97.6 °F (36.4 °C)  Max: 98.2 °F (36.8 °C) BP Systolic (24hrs), Av , Min:89 , Max:222   Diastolic (24hrs), Av, Min:40, Max:169   Pulse Pulse  Av.4  Min: 65  Max: 84 Resp Resp  Av.6  Min: 12  Max: 25 Pulse ox SpO2  Av.8 %  Min: 92  %  Max: 100 %    Consults:  Neurosurgery- getting and MRI scan for her compression fractures age indeterminant- she has old large spine scoliosis surgery, they are not asking for a brace when up at this time.  SDH is now gone- recs for holding AC x 2weeks    IM- consulted for geriatric frail- havent seen yet        Transfer  Checklist:  [x]   Vital signs changed to q4 hours x 48 hours, continuous telemetry x 48 hours  [x]   Provider assessment BID x 48 hours  []   Daily labs x 48 hours  [x]   Tertiary note completed (if applicable)  [x]   Frailty index completed (if applicable)  [x]   Geriatrics consult placed/called if applicable  [x]   Medications/orders reviewed  []   Updated photographs of wounds   []   Wound dressing orders placed if applicable    Plan and recommended follow-up:    Ot pt speech to evaluate if home going  Her BP runs a bit high- if needed increase the coreg but hopefully she can just follow up as OP- there is no visible blood in her head now and she fell 3 days prior to admission  Follow up spinal MRI- they are not asking for brace while up at this time and she has had extensive prior surgery- expect this to be no intervention required    She could be able to dc home as early as this evening but more louie 6/20       Electronically signed by GAURAV COLEMAN - CNP on 6/18/2025 at 11:43 AM

## 2025-06-18 NOTE — ACP (ADVANCE CARE PLANNING)
Advance Care Planning   The patient has the following advanced directives on file:  Advance Directives       Power of  Living Will ACP-Advance Directive ACP-Power of     Not on File Filed on 10/17/24 Filed Not on File            The patient has appointed the following active healthcare agents:    Primary Decision Maker: Padilla Mcdowell - [phone]    The Patient has the following current code status:    Code Status: DNR-CCA    Visit Documentation:  I discussed Advance Care Planning with Monalisa Mcdowell today which included the importance of making their choices for care and treatment in the case of a health event that adversely affects their decision-making abilities. She has not completed the Advance Care Directives. She does not have an active health care agent at this time.  Monalisa Mcdowell was encouraged to complete the declaration forms and provide a signed copy of her medical records.     AGUILA WAYNE, GAURAV - ANGELA  6/18/2025

## 2025-06-18 NOTE — PROGRESS NOTES
ICU PROGRESS NOTE        PATIENT NAME: Monalisa Mcdowell  MEDICAL RECORD NO. [id number]  DATE: 6/18/2025    HD: # 1    80-year-old female with past medical history of atrial fibrillation on Eliquis who presented to the ED on 6/17 after a mechanical fall from standing that occurred on 6/14 without LOC. [facility]     Injuries:  -Trace left subdural hematoma, up to 3 mm in maximal thickness without midline shift  -Age-indeterminate compression deformity at T11 and L2 that may be acute/subacute.    6/17: Admission    Plan:  -Transfer out of ICU  -F/U NSGY recommendations this morning after CT Head/when to start DVT PPX  -Confirm home medications with son    ACUTE DIAGNOSES/PLAN  Neuro:  Left SDH, up to 3 mm  Neurosurgery consulted: No intervention, recommend holding AC for 2 weeks  Reversed with PCC at outlying facility.   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  Neurochecks per protocol  MMPT: Tylenol scheduled, Lidocaine patch, PRN Cara    CV  HR within normal limits (60s-80s)  Maintain normotension, no BP goals  SBP 110s-160s, one episode of   Required 10 mg Hydralazine x 2 and 10 mg Labetalol x 1  PRN antihypertensives ordered  Troponin: 27, consistent with baseline  Home Coreg (3.125 mg BID) ordered  History of atrial fibrillation on Eliquis  Stress test November 2024: Low risk of cardiac events  Echo November 2024: EF 55 to 60%, mild concentric hypertrophy    Pulm  Saturating well on room air  Maintain SpO2 > 92%  History of seasonal bronchitis: PRN Albuterol ordered    GI/Nutrition  Regular diet  Home Pepcid ordered  Bowel regimen: Glycolax  History of mesentero-axial volvulus and hiatal hernia    Renal/lytes  Creatinine 1.4 (Baseline 1.4-1.6)  Sodium 138, Potassium 4.1  No fluids   cc since arrival    Heme  DVT prophylaxis-held per

## 2025-06-18 NOTE — FLOWSHEET NOTE
Pt transferred to Rm 246 on monitor with all belongings. No further questions or concerns from receiving RN.

## 2025-06-18 NOTE — PROGRESS NOTES
Facility/Department: [facility] CAR 1- SICU  Initial Speech/Language/Cognitive Assessment    NAME: Monalisa Mcdowell  : 1945   MRN: [id number]  ADMISSION DATE: 2025  ADMITTING DIAGNOSIS: has Abnormal EKG; Mixed hyperlipidemia; Essential hypertension; Other chronic pain; Atrial fibrillation (HCC); Osteoarthritis of hip; Bronchitis; Cough in adult; History of creation of ventriculoperitoneal shunt; Acute non-recurrent maxillary sinusitis; Mesenteroaxial gastric volvulus; Lactic acidosis; Leukocytosis; Hypertension, essential; Elevated serum creatinine; Postinfectious hypothyroidism; Severe malnutrition; SDH (subdural hematoma) (HCC); CKD stage 3b, GFR 30-44 ml/min (HCC); (HFpEF) heart failure with preserved ejection fraction (HCC); and Mild pulmonary hypertension (HCC) on their problem list.    Date of Eval: 2025   Evaluating Therapist: MANUELA OMER    Primary Complaint:   Monalisa Mcdowell is a female that presented to the Emergency Department following a fall from standing height on the night of  (2-3 days ago).  She reports that she was adjusting water temperature in the shower while standing outside of the tub, and when she stood up straight, she kept going backwards and landed on her back and hit her head on the vanity behind her.  She denies syncope or loss of consciousness.  She has had a headache and back pain since then.  She reports that she knows she has scoliosis, but she is experiencing new, sharp low back pain.  She has continued to take her Eliquis.  Her last dose was this morning at 7 AM.  She presented to the [place] ER for headache/head pain.  At [place], her Eliquis was reversed with PCC.  CT was obtained of head and cervical spine which demonstrated a trace left-sided SDH.  Cervical spine was cleared at [place] prior to transfer.     Pain:  Pain Assessment  Pain Assessment: None - Denies Pain  Pain Level: 0    Vision/ Hearing  Vision  Vision: Impaired  Hearing  Hearing:

## 2025-06-18 NOTE — PROGRESS NOTES
Occupational Therapy  Occupational Therapy Initial Evaluation  Facility/Department: [facility] CAR 1- SICU   Patient Name: Monalisa Mcdowell        MRN: [id number]    : 1945    Date of Service: 2025    Chief Complaint   Patient presents with    Fall     Past Medical History:  has a past medical history of Acid reflux, Hyperlipidemia, Hypertension, and Thyroid disease.  Past Surgical History:  has a past surgical history that includes Tonsillectomy; Cholecystectomy; Ovary removal; Appendectomy; Dilation and curettage of uterus; Colonoscopy; Urethral sling; and Cystoscopy.    Discharge Recommendations  Discharge Recommendations: Patient would benefit from continued therapy after discharge  OT Equipment Recommendations  Other: CTA    Assessment  Performance deficits / Impairments: Decreased functional mobility ;Decreased high-level IADLs;Decreased ADL status;Decreased endurance;Decreased balance  Assessment: Pt presents to OT this date with above noted deficits. Pt is not currently functioning at [facility]. Pt requires SBA bed mobility, SBA transfers w/ RW, CGA func mob w/ RW, SBA don BLE footwear. D/t this Pt does not demonstrate the functional ability to safely return to prior living arrangements without assist. It is recommended that Pt recieve OT services during hospitalization and after discharge to increase safety/ADLs for safe return to previous environment.  Prognosis: Good  Decision Making: Medium Complexity  REQUIRES OT FOLLOW-UP: Yes  Activity Tolerance  Activity Tolerance: Patient Tolerated treatment well  Safety Devices  Type of Devices: Call light within reach;Chair alarm in place;Gait belt;Patient at risk for falls;Left in chair;Nurse notified  Restraints  Restraints Initially in Place: No    AM-PAC  AM-PAC Daily Activity - Inpatient   How much help is needed for putting on and taking off regular lower body clothing?: A Little  How much help is needed for bathing (which includes washing, rinsing, drying)?: A

## 2025-06-18 NOTE — PLAN OF CARE
Problem: Safety - Adult  Goal: Free from fall injury  Outcome: Progressing     Problem: ABCDS Injury Assessment  Goal: Absence of physical injury  Outcome: Progressing     Problem: Pain  Goal: Verbalizes/displays adequate comfort level or baseline comfort level  Outcome: Progressing  Flowsheets  Taken 6/18/2025 0400 by George Anne, RN  Verbalizes/displays adequate comfort level or baseline comfort level: Encourage patient to monitor pain and request assistance  Taken 6/18/2025 0000 by George Anne, RN  Verbalizes/displays adequate comfort level or baseline comfort level: Encourage patient to monitor pain and request assistance     Problem: Neurosensory - Adult  Goal: Achieves maximal functionality and self care  Outcome: Progressing     Problem: Cardiovascular - Adult  Goal: Maintains optimal cardiac output and hemodynamic stability  Outcome: Progressing     Problem: Gastrointestinal - Adult  Goal: Minimal or absence of nausea and vomiting  Outcome: Progressing     Problem: Gastrointestinal - Adult  Goal: Maintains or returns to baseline bowel function  Outcome: Progressing     Problem: Gastrointestinal - Adult  Goal: Maintains adequate nutritional intake  Outcome: Progressing     Problem: Genitourinary - Adult  Goal: Absence of urinary retention  Outcome: Progressing     Problem: Metabolic/Fluid and Electrolytes - Adult  Goal: Electrolytes maintained within normal limits  Outcome: Progressing     Problem: Skin/Tissue Integrity - Adult  Goal: Skin integrity remains intact  Outcome: Progressing     Problem: Hematologic - Adult  Goal: Maintains hematologic stability  Outcome: Progressing     Problem: Musculoskeletal - Adult  Goal: Return mobility to safest level of function  Outcome: Progressing     Problem: Musculoskeletal - Adult  Goal: Return ADL status to a safe level of function  Outcome: Progressing

## 2025-06-18 NOTE — PROGRESS NOTES
Physical Therapy  Facility/Department: [facility] CAR 1- SICU   Physical Therapy Initial Evaluation    Patient Name: Monalisa Mcdowell        MRN: [id number]    : 1945    Date of Service: 2025    Chief Complaint   Patient presents with    Fall     Per EMR,  80 y.o. female who presents with T11 and L2 compression fractures, small acute left side subdural hematoma s/p fall      Past Medical History:  has a past medical history of Acid reflux, Hyperlipidemia, Hypertension, and Thyroid disease.  Past Surgical History:  has a past surgical history that includes Tonsillectomy; Cholecystectomy; Ovary removal; Appendectomy; Dilation and curettage of uterus; Colonoscopy; Urethral sling; and Cystoscopy.    Discharge Recommendations  Discharge Recommendations: Patient would benefit from continued therapy after discharge  PT Equipment Recommendations  Equipment Needed: No    Assessment  Body Structures, Functions, Activity Limitations Requiring Skilled Therapeutic Intervention: Decreased functional mobility , Decreased strength, Decreased safe awareness, Decreased endurance, Decreased balance, Decreased coordination, Decreased posture, Decreased body mechanics, Decreased sensation  Assessment: Patient presents with SDH due to a fall. Pt required SBA for bed mobility and transfers with RW. Prior to activity pt noted L hip and low back pain 3/10 that increased up to 6/10 after 10ft total of ambulation with CGA and RW. Pain was relieved to 4/10 with sitting in recliner. Pt demonstrated decreased R step length and slow rae and limited tolerance to turning with RW, noting that twisting increases  her back pain. pt was educated on proper body mechanics to turn, however this was difficult for patient to acheive. Patient will benefit from continued therapy to improve tolerance to functional activities.  Therapy Prognosis: Good  Decision Making: Medium Complexity  Requires PT Follow-Up: Yes  Activity Tolerance  Activity Tolerance:

## 2025-06-18 NOTE — PROGRESS NOTES
15 Variable Trauma-Specific Frailty Index   Comorbidities   Cancer History No (0)   Coronary Heart Disease No(0)   Dementia No (0)   Daily Activities   Help With Grooming No (0)   Help with Managing Money No (0)   Help doing Housework Yes (1)   Help with Toileting No (0)   Help Walking Walker (0.75)   Health Attitude   Feel Less Useful Sometimes (0.5)   Feel Sad Sometimes (0.5)   Feel Effort to Do Everything Sometimes (0.5)   Feels Lonely Sometimes (0.5)   Falls Sometimes (0.5)   Function   Sexually Active no(1)   Nutrition   Albumin > 3 g/dL (0)   Scoring   Score  >4   *Based on 2-weeks prior to hospital admission   Trauma Specific Fraility Index > or = to 4 (4/15 = 0.26)  Trauma Specific Fraility Index Score:    >4     Her kids live with her,   of Parkinsons, she uses a walker and has had significant depression (suicidal in past) being treated moderately well.  Mechanical fall over a bath mat.  Kids can be equal decision makers, chooses dnr [facility] as aligning with her goals for care

## 2025-06-18 NOTE — CONSULTS
shortness of breath, cough, congestion, wheezing.  CARDIOVASCULAR:  negative for chest pain, palpitations.  GASTROINTESTINAL:  negative for nausea, vomiting, +diarrhea, constipation, change in bowel habits, abdominal pain   GENITOURINARY:  +difficulty of urination, burning with urination, frequency   INTEGUMENT:  negative for rash, skin lesions, easy bruising   HEMATOLOGIC/LYMPHATIC:  negative for swelling/edema   ALLERGIC/IMMUNOLOGIC:  negative for urticaria , itching  ENDOCRINE:  negative increase in drinking, increase in urination, hot or cold intolerance  MUSCULOSKELETAL:  negative joint pains, muscle aches, swelling of joints  NEUROLOGICAL:  +headaches, dizziness, lightheadedness, numbness, pain, tingling extremities  BEHAVIOR/PSYCH:  negative for depression, anxiety  + thoracic back pain    Physical Exam:     BP (!) 158/62   Pulse 65   Temp 97.7 °F (36.5 °C) (Oral)   Resp 14   Ht 1.651 m (5' 5\")   Wt 52.2 kg (115 lb)   SpO2 98%   BMI 19.14 kg/m²   Temp (24hrs), Av.9 °F (36.6 °C), Min:97.6 °F (36.4 °C), Max:98.2 °F (36.8 °C)    No results for input(s): \"POCGLU\" in the last 72 hours.    Intake/Output Summary (Last 24 hours) at 2025 1246  Last data filed at 2025 2300  Gross per 24 hour   Intake 240 ml   Output 750 ml   Net -510 ml       General Appearance:  alert, well appearing, and in no acute distress  Mental status: oriented to person, place, and time with normal affect  Head:  normocephalic, atraumatic.  Eye: no icterus, redness, pupils equal and reactive, extraocular eye movements intact, conjunctiva clear  Ear: normal external ear, no discharge, hearing intact  Nose:  no drainage noted  Mouth: mucous membranes moist  Neck: supple, no carotid bruits, thyroid not palpable  Lungs: Bilateral equal air entry, clear to ausculation, no wheezing, rales or rhonchi, normal effort  Cardiovascular: normal rate, regular rhythm, no murmur, gallop, rub.  Abdomen: Soft, nontender, nondistended, normal  Continue Wellbutrin    Plan  Delirium precautions  Please ensure appropriate day/night wake/sleep cycles using the following methods: ensure blinds open during the day and closed at night. Ensure TV or other form of stimulus is on during the day and off at night. Ensure lights are on during the day , off at night. Make sure patient has hearing aids/glasses. Ensure team information is written clearly on patient board. Arrange for family to visit as frequently as possible. Reorient patient as frequently as possible  Monitor for urinary retention, aspiration events and falls.   Minimize night time awakenings, middle of the night med dosing, lab draws between 12am and 6 am if medically safe  If patient is having difficulty sleeping can use Melatonin 6 mg QHS  If having behavioral disturbance and poses a harm to self or others can try oral low dose Seroquel or IM zyprexa if Qtc stable. Avoid physical restraints is possible.  Avoid benzodiazepines for delirium. Avoid anticholinergics.   Recommend Dietary consult to help optimize nutrition  Needs aggressive PTOT- Recommend turning patient per protocol to avoid wounds. Maintain fall precautions.   Monitor for urinary retention.    Medicine to sign off at this time.  Please reconsult if further questions arise    Consultations:   IP CONSULT TO NEUROSURGERY  IP CONSULT TO INTERNAL MEDICINE      GAURAV Ricks NP  6/18/2025  12:46 PM    Copy sent to Duane Mooney MD

## 2025-06-18 NOTE — PROGRESS NOTES
[facility] Inpatient Brief Diagnostic Assessment Note  LEROY FERNANDEZCatalinoS   6/18/2025    Monalisa Mcdowell  1945  [id number]      Time Spent with Patient: 15 minutes or less (Brief Diagnostic Assessment) 06182    Pt was provided informed consent for the [facility]. Discussed with patient model of service to include the limits of confidentiality (i.e. abuse reporting, suicide intervention, etc.) and short-term intervention focused approach.  Pt indicated understanding.    [facility] Inpatient or Virtual Question: This was not a virtual session    Is consult a Victim of Crime?: No    Presenting Patient Report:    Patient presents as a 80 y.o. female subsequent to hospital admission following Geriatric Fall resulting in injury.     Patient denies any current symptoms of depression or anxiety, but endorses history of depression which is adequately treated by medications,  and declines Mental Health interventions at this time.    Patient was able to complete the following screens: ITSS    MSE:     Appearance:   Hospital Gown, Well Groomed, Good Hygiene, and Good Eye Contact  Speech:    spontaneous, normal rate, normal volume, and well-articulated  Affect Observed:   Appropriate to Context  Thought Content:    intact  Thought Process:    linear, goal directed, and coherent  Associations:    logical connections  Insight:    Fair  Judgment:    Intact  Orientation:    oriented to person, place, time, and general circumstances    Patient reports and/or exhibits the following symptoms:    Mood: Appropriate to Context    Cognitive symptoms: Appropriate to Context    Behaviors: Appropriate to Context    Somatic: None Reported      Assessment:  Patient denies any current symptoms for depression or anxiety but endorses history of depression that is medically treated.   Patient scored low on Injured Trauma Survivor Screen (ITSS).  Patient does not meet criteria for new depression or anxiety diagnosis at this time.

## 2025-06-18 NOTE — CARE COORDINATION
Met with pt to complete an SBIRT.  Pt is alert and oriented.  She states that she had a fall at home.    She states that her son and daughter in law live with her.  She denies alcohol and drug use.  She states that she is on anti-depression meds but does have \"mild depression\" at times.          Alcohol Screening and Brief Intervention        No results for input(s): \"ALC\" in the last 72 hours.    Alcohol Pre-screening     (WOMEN ONLY) How many times in the past year have you had 4 or more drinks in a day?: None       Drug Pre-Screening        Drug Screening DAST       Mood Pre-Screening (PHQ-2)  During the past 2 weeks, have you been bothered by, feeling down, depressed or hopeless?  Yes  Feeling down, depressed or hopeless      I have interviewed Monalisa Mcdowell, [id number] regarding  Her alcohol consumption/drug use and risk for excessive use. Screenings were negative.  Patient  N/A intervention at this time.   Deferred []    Completed on: 6/18/2025   ARON OSHEA

## 2025-06-18 NOTE — PLAN OF CARE
Problem: ABCDS Injury Assessment  Goal: Absence of physical injury  Recent Flowsheet Documentation  Taken 6/17/2025 2300 by George Anne RN  Absence of Physical Injury: Implement safety measures based on patient assessment  6/17/2025 1715 by Anna Carrington RN  Outcome: Progressing     Problem: Pain  Goal: Verbalizes/displays adequate comfort level or baseline comfort level  Recent Flowsheet Documentation  Taken 6/18/2025 0000 by George Anne RN  Verbalizes/displays adequate comfort level or baseline comfort level: Encourage patient to monitor pain and request assistance  Taken 6/17/2025 2000 by George Anne RN  Verbalizes/displays adequate comfort level or baseline comfort level: Encourage patient to monitor pain and request assistance  6/17/2025 1715 by Anna Carrington RN  Outcome: Progressing

## 2025-06-18 NOTE — PROGRESS NOTES
Neurosurgery MAUREEN/Resident    Daily Progress Note   CC:  Chief Complaint   Patient presents with    Fall     6/18/2025  10:33 AM    Chart reviewed.  No acute events overnight.  No new complaints. Reports some back pain on palpation, reports stable headache    Vitals:    06/18/25 0500 06/18/25 0600 06/18/25 0700 06/18/25 0845   BP: (!) 143/59 (!) 141/57 (!) 158/62    Pulse: 67 67 65    Resp: 18 15 14    Temp:    98.2 °F (36.8 °C)   TempSrc:    Oral   SpO2: 97% 95% 98%    Weight:       Height:           PE:     AOx3   PERRL, EOMI  Cranial Nerves:    II: Visual acuity:  normal  III: Pupils:  equal, round, reactive to light  III,IV,VI: Extra Ocular Movements:intact  V: Facial sensation:  intact  VII: Facial strength: intact  VIII: Hearing:  intact  IX: Palate:  intact  XI: Shoulder shrug: intact  XII: Tongue movement: intact      Motor   L deltoid 5/5; R deltoid 5/5  L biceps 5/5; R biceps 5/5  L triceps 5/5; R triceps 5/5  L wrist extension 5/5; R wrist extension 5/5  L intrinsics 5/5; R intrinsics 5/5      L iliopsoas 5/5 , R iliopsoas 5/5  L quadriceps 5/5; R quadriceps 5/5  L Dorsiflexion 5/5; R dorsiflexion 5/5  L Plantarflexion 5/5; R plantarflexion 5/5  L EHL 5/5; R EHL 5/5      Sensation: intact         Lab Results   Component Value Date    WBC 9.2 06/18/2025    HGB 12.5 06/18/2025    HCT 40.3 06/18/2025     06/18/2025    CHOL 169 03/03/2025    TRIG 272 (H) 03/03/2025    HDL 65 03/03/2025    ALT 10 03/03/2025    AST 18 03/03/2025     06/18/2025    K 4.1 06/18/2025     06/18/2025    CREATININE 1.4 (H) 06/18/2025    BUN 17 06/18/2025    CO2 20 06/18/2025    TSH 17.00 (H) 06/17/2025    INR 0.9 06/17/2025    LABA1C 5.6 06/17/2025       Radiology         A/P  80 y.o. female who presents with T11 and L2 compression fractures, small acute left side subdural hematoma s/p fall    Will obtain MRI lumbar spine due to pain   Repeat CT head completed and is stable this morning   She as on Eiquis for her A

## 2025-06-18 NOTE — CARE COORDINATION
Trauma Coordinator Rounding Note  Daily check in:  I met with Monalisa Mcdowell  at bedside to review their plan of care. I allowed opportunity for Monalisa Mcdowell to ask questions regarding their injuries, expected length of stay and disposition plan. All questions answered to verbal satisfaction.   [x]Wounds / Injuries  [x]PT/OT/speech  []Incentive spirometer - deferred, pt sitting up in chair eating at time of visit.   [x]Diet  [x]Activity  [x]Preferred pharmacy  [x] PCP Confirmed  [x] Medical Insurance Confirmed  [] Work-related Injury  [x] Pain Management Education    DC Expectation:  Patient expects to be discharged to Home w family. Pt states she has adult children living with her.   Bedside Nurse:  RN unavailable. I received clinical updates from trauma team.   Discharge Info:  I confirmed follow up information was placed in the discharge instructions for Neurosurgery.   Discharge instructions reviewed and updated in patient's chart.   :  I provided a clinical update to the unit  and confirmed the disposition plan. Current barriers to discharge include: ongoing medical monitoring indicated. Plan is med / surg once bed available.   Paperwork assistance:  N/A.   Electronically signed by Sasha Ohara RN on 6/18/2025 at 12:08 PM

## 2025-06-19 VITALS
RESPIRATION RATE: 15 BRPM | HEART RATE: 69 BPM | TEMPERATURE: 97.9 F | BODY MASS INDEX: 19.16 KG/M2 | HEIGHT: 65 IN | DIASTOLIC BLOOD PRESSURE: 72 MMHG | OXYGEN SATURATION: 97 % | SYSTOLIC BLOOD PRESSURE: 146 MMHG | WEIGHT: 115 LBS

## 2025-06-19 LAB
ANION GAP SERPL CALCULATED.3IONS-SCNC: 12 MMOL/L (ref 9–16)
BASOPHILS # BLD: 0.07 K/UL (ref 0–0.2)
BASOPHILS NFR BLD: 1 % (ref 0–2)
BUN SERPL-MCNC: 20 MG/DL (ref 8–23)
CALCIUM SERPL-MCNC: 9.2 MG/DL (ref 8.6–10.4)
CHLORIDE SERPL-SCNC: 103 MMOL/L (ref 98–107)
CO2 SERPL-SCNC: 20 MMOL/L (ref 20–31)
CREAT SERPL-MCNC: 1.5 MG/DL (ref 0.6–0.9)
EOSINOPHIL # BLD: 0.13 K/UL (ref 0–0.44)
EOSINOPHILS RELATIVE PERCENT: 2 % (ref 1–4)
ERYTHROCYTE [DISTWIDTH] IN BLOOD BY AUTOMATED COUNT: 12.9 % (ref 11.8–14.4)
GFR, ESTIMATED: 35 ML/MIN/1.73M2
GLUCOSE SERPL-MCNC: 119 MG/DL (ref 74–99)
HCT VFR BLD AUTO: 39.8 % (ref 36.3–47.1)
HGB BLD-MCNC: 12.7 G/DL (ref 11.9–15.1)
IMM GRANULOCYTES # BLD AUTO: 0.04 K/UL (ref 0–0.3)
IMM GRANULOCYTES NFR BLD: 1 %
LYMPHOCYTES NFR BLD: 1.56 K/UL (ref 1.1–3.7)
LYMPHOCYTES RELATIVE PERCENT: 23 % (ref 24–43)
MCH RBC QN AUTO: 28.5 PG (ref 25.2–33.5)
MCHC RBC AUTO-ENTMCNC: 31.9 G/DL (ref 28.4–34.8)
MCV RBC AUTO: 89.2 FL (ref 82.6–102.9)
MONOCYTES NFR BLD: 0.42 K/UL (ref 0.1–1.2)
MONOCYTES NFR BLD: 6 % (ref 3–12)
NEUTROPHILS NFR BLD: 67 % (ref 36–65)
NEUTS SEG NFR BLD: 4.68 K/UL (ref 1.5–8.1)
NRBC BLD-RTO: 0 PER 100 WBC
PLATELET # BLD AUTO: 199 K/UL (ref 138–453)
PMV BLD AUTO: 9.5 FL (ref 8.1–13.5)
POTASSIUM SERPL-SCNC: 4.1 MMOL/L (ref 3.7–5.3)
RBC # BLD AUTO: 4.46 M/UL (ref 3.95–5.11)
SODIUM SERPL-SCNC: 135 MMOL/L (ref 136–145)
WBC OTHER # BLD: 6.9 K/UL (ref 3.5–11.3)

## 2025-06-19 PROCEDURE — 97530 THERAPEUTIC ACTIVITIES: CPT

## 2025-06-19 PROCEDURE — 6370000000 HC RX 637 (ALT 250 FOR IP): Performed by: NURSE PRACTITIONER

## 2025-06-19 PROCEDURE — 6370000000 HC RX 637 (ALT 250 FOR IP)

## 2025-06-19 PROCEDURE — 85025 COMPLETE CBC W/AUTO DIFF WBC: CPT

## 2025-06-19 PROCEDURE — 99238 HOSP IP/OBS DSCHRG MGMT 30/<: CPT | Performed by: NURSE PRACTITIONER

## 2025-06-19 PROCEDURE — 36415 COLL VENOUS BLD VENIPUNCTURE: CPT

## 2025-06-19 PROCEDURE — 80048 BASIC METABOLIC PNL TOTAL CA: CPT

## 2025-06-19 PROCEDURE — 97535 SELF CARE MNGMENT TRAINING: CPT

## 2025-06-19 RX ADMIN — OXYCODONE 2.5 MG: 5 TABLET ORAL at 02:57

## 2025-06-19 RX ADMIN — CEPHALEXIN 250 MG: 250 CAPSULE ORAL at 08:57

## 2025-06-19 RX ADMIN — ACETAMINOPHEN 1000 MG: 500 TABLET, FILM COATED ORAL at 05:54

## 2025-06-19 RX ADMIN — BUPROPION HYDROCHLORIDE 150 MG: 150 TABLET, EXTENDED RELEASE ORAL at 08:54

## 2025-06-19 RX ADMIN — LEVOTHYROXINE SODIUM 88 MCG: 0.09 TABLET ORAL at 08:55

## 2025-06-19 RX ADMIN — CARVEDILOL 3.12 MG: 6.25 TABLET, FILM COATED ORAL at 08:55

## 2025-06-19 RX ADMIN — FAMOTIDINE 20 MG: 20 TABLET, FILM COATED ORAL at 08:54

## 2025-06-19 RX ADMIN — CITALOPRAM HYDROBROMIDE 30 MG: 20 TABLET ORAL at 08:54

## 2025-06-19 RX ADMIN — ACETAMINOPHEN 1000 MG: 500 TABLET, FILM COATED ORAL at 13:48

## 2025-06-19 ASSESSMENT — PAIN DESCRIPTION - LOCATION: LOCATION: BACK

## 2025-06-19 ASSESSMENT — PAIN SCALES - GENERAL
PAINLEVEL_OUTOF10: 6
PAINLEVEL_OUTOF10: 3
PAINLEVEL_OUTOF10: 7

## 2025-06-19 NOTE — PROGRESS NOTES
PROGRESS NOTE        PATIENT NAME: Monalisa Mcdowell  MEDICAL RECORD NO. [id number]  DATE: 6/19/2025    HD: # 2    80-year-old female with past medical history of atrial fibrillation on Eliquis who presented to the ED on 6/17 after a mechanical fall from standing that occurred on 6/14 without LOC. Eliquis was reversed at [facility] facility with PCC.     Injuries:  -Trace left subdural hematoma, up to 3 mm in maximal thickness without midline shift  -Age-indeterminate compression deformity at T11 and L2 that may be acute/subacute.    6/17: Admission      ACUTE DIAGNOSES/PLAN  Left SDH, up to 3 mm  Neurosurgery consulted: No intervention, recommend holding AC for 2 weeks  [facility]   Recommend CT Head 6/18 AM  No SBP goals  Keppra 500 mg BID x 7 days for seizure prophylaxis   History of NPH with  shunt in place from 2021    History of atrial fibrillation on Eliquis  Stress test November 2024: Low risk of cardiac events  Echo November 2024: EF 55 to 60%, mild concentric hypertrophy    Age-indeterminate compression deformity of T11, L2  Neurosurgery consulted: No acute intervention,   LSO brace  Ok for PT      UTI (Diagnosed 6/15 prior to admission): Keflex x 7 days (End 6/22)  Culture: E. Coli    Family/dispo  Planning for discharge to home      Chief Complaint: \"I fell\"    SUBJECTIVE    Monalisa Mcdowell is an 80-year-old female medical history of atrial fibrillation on Eliquis as well as seasonal bronchitis/asthma who presented to an [facility] emergency department earlier today on 6/17 after a mechanical fall from standing height that occurred on the evening of 6/14, 2 to 3 days ago.  She states that she was in the bathroom adjusting temperature in the shower and lost her balance when standing up straight, causing her to fall backwards onto her back and hit her head.  She denies that she lost consciousness.  She reports ongoing headache and back pain since that time.  Her last dose of Eliquis was

## 2025-06-19 NOTE — PROGRESS NOTES
Neurosurgery MAUREEN/Resident    Daily Progress Note   Chief Complaint   Patient presents with    Fall     6/19/2025  7:54 AM    Chart reviewed.  No acute events overnight.  No new complaints. Denies any headaches or vision changes. Does endorse pain in her lower back with ambulation. Denies any new numbness/tingling/weakness.     Vitals:    06/18/25 2108 06/18/25 2334 06/19/25 0327 06/19/25 0426   BP: 135/76 (!) 163/77  (!) 170/82   Pulse: 75 69  80   Resp: 16 16 17 17   Temp: 98.8 °F (37.1 °C) 98.2 °F (36.8 °C)  97.9 °F (36.6 °C)   TempSrc: Oral   Oral   SpO2: 97% 98%  97%   Weight:       Height:             PE:   AOx3   CNII-XII intact   PERRL, EOMI   Motor   L deltoid 5/5; R deltoid 5/5  L biceps 5/5; R biceps 5/5  L triceps 5/5; R triceps 5/5  L wrist extension 5/5; R wrist extension 5/5  L intrinsics 5/5; R intrinsics 5/5      L iliopsoas 5/5 , R iliopsoas 5/5  L quadriceps 5/5; R quadriceps 5/5  L Dorsiflexion 5/5; R dorsiflexion 5/5  L Plantarflexion 5/5; R plantarflexion 5/5  L EHL 5/5; R EHL 5/5    Sensation: intact    Lab Results   Component Value Date    WBC 9.2 06/18/2025    HGB 12.5 06/18/2025    HCT 40.3 06/18/2025     06/18/2025    CHOL 169 03/03/2025    TRIG 272 (H) 03/03/2025    HDL 65 03/03/2025    ALT 10 03/03/2025    AST 18 03/03/2025     06/18/2025    K 4.1 06/18/2025     06/18/2025    CREATININE 1.4 (H) 06/18/2025    BUN 17 06/18/2025    CO2 20 06/18/2025    TSH 17.00 (H) 06/17/2025    INR 0.9 06/17/2025    LABA1C 5.6 06/17/2025       Radiology   MRI LUMBAR SPINE WO CONTRAST  Result Date: 6/18/2025  EXAMINATION: MRI OF THE LUMBAR SPINE WITHOUT CONTRAST, 6/18/2025 7:52 pm TECHNIQUE: Multiplanar multisequence MRI of the lumbar spine was performed without the administration of intravenous contrast. COMPARISON: None. HISTORY: ORDERING SYSTEM PROVIDED HISTORY: T11 and L2 fracture please scan to T11 , assess chronicity TECHNOLOGIST PROVIDED HISTORY: T11 and L2 fracture please scan to

## 2025-06-19 NOTE — CARE COORDINATION
Trauma Coordinator Rounding Note  Daily check in:  I met with Monalisa Mcdowell  at bedside to review their plan of care. I allowed opportunity for Monalisa Mcdowell to ask questions regarding their injuries, expected length of stay and disposition plan. All questions answered to verbal satisfaction.   [x] Wounds  [x] PT/OT/speech  [x] Incentive spirometer  [x] Diet  [x] Activity  [] Paperwork  [x] Pain Management Check-in  Bedside Nurse:  I spoke with the patient's assigned nurse to review the plan of care for today and provide an opportunity to ask questions or relay any concerns to the Trauma service.    :  I provided a clinical update to the unit  and confirmed the disposition plan. Current barriers to discharge include: ongoing medical monitoring indicated.   Electronically signed by Sasha Ohara RN on 6/19/2025 at 12:09 PM

## 2025-06-19 NOTE — PLAN OF CARE
Problem: Safety - Adult  Goal: Free from fall injury  Outcome: Progressing     Problem: ABCDS Injury Assessment  Goal: Absence of physical injury  Outcome: Progressing     Problem: Pain  Goal: Verbalizes/displays adequate comfort level or baseline comfort level  Outcome: Progressing     Problem: Neurosensory - Adult  Goal: Achieves maximal functionality and self care  Outcome: Progressing     Problem: Cardiovascular - Adult  Goal: Maintains optimal cardiac output and hemodynamic stability  Outcome: Progressing     Problem: Gastrointestinal - Adult  Goal: Minimal or absence of nausea and vomiting  Outcome: Progressing  Goal: Maintains or returns to baseline bowel function  Outcome: Progressing  Goal: Maintains adequate nutritional intake  Outcome: Progressing     Problem: Genitourinary - Adult  Goal: Absence of urinary retention  Outcome: Progressing     Problem: Metabolic/Fluid and Electrolytes - Adult  Goal: Electrolytes maintained within normal limits  Outcome: Progressing     Problem: Skin/Tissue Integrity - Adult  Goal: Skin integrity remains intact  Outcome: Progressing     Problem: Hematologic - Adult  Goal: Maintains hematologic stability  Outcome: Progressing     Problem: Musculoskeletal - Adult  Goal: Return mobility to safest level of function  Outcome: Progressing  Goal: Return ADL status to a safe level of function  Outcome: Progressing

## 2025-06-19 NOTE — PROGRESS NOTES
RN gave pt discharge instructions, all questions answered. Pt discharged via wheelchair with all personal belongings to home.

## 2025-06-19 NOTE — PROGRESS NOTES
Physical Therapy  Facility/Department: [address] ORTHO/MED SURG   Physical Therapy Daily Treatment Note    Patient Name: Monalisa Mcdowell        MRN: [id number]    : 1945    Date of Service: 2025    Chief Complaint   Patient presents with    Fall     Past Medical History:  has a past medical history of Acid reflux, Hyperlipidemia, Hypertension, and Thyroid disease.  Past Surgical History:  has a past surgical history that includes Tonsillectomy; Cholecystectomy; Ovary removal; Appendectomy; Dilation and curettage of uterus; Colonoscopy; Urethral sling; Cystoscopy; and Ventriculoperitoneal shunt (Right, 10/15/2021).    Discharge Recommendations  Discharge Recommendations: Patient would benefit from continued therapy after discharge  PT Equipment Recommendations  Equipment Needed: No    Assessment  Body Structures, Functions, Activity Limitations Requiring Skilled Therapeutic Intervention: Decreased functional mobility ;Decreased strength;Decreased safe awareness;Decreased endurance;Decreased balance;Decreased coordination;Decreased posture;Decreased body mechanics;Decreased sensation  Assessment: The pt ambulated 75 ft with a RW x CGA with a LSO brace in place. She reported an improvement in her back pain with the use of the LSO brace.She could benefit from a continuation of PT for gait , strengthening and functional mobility prior to her DC  Therapy Prognosis: Good  Decision Making: Medium Complexity  Requires PT Follow-Up: Yes  Activity Tolerance  Activity Tolerance: Patient limited by pain;Patient limited by endurance;Patient limited by fatigue  Safety Devices  Type of Devices: Call light within reach;Gait belt;Patient at risk for falls;Nurse notified;Left in bed;Bed alarm in place  Restraints  Restraints Initially in Place: No    AM-PAC  AM-PAC Basic Mobility - Inpatient   How much help is needed turning from your back to your side while in a flat bed without using bedrails?: None  How much help is needed

## 2025-06-19 NOTE — PROGRESS NOTES
Occupational Therapy  Occupational Therapy Daily Treatment Note  Facility/Department: [address] ORTHO/MED SURG   Patient Name: Monalisa Mcdowell        MRN: [id number]    : 1945    Date of Service: 2025    Chief Complaint   Patient presents with    Fall     Past Medical History:  has a past medical history of Acid reflux, Hyperlipidemia, Hypertension, and Thyroid disease.  Past Surgical History:  has a past surgical history that includes Tonsillectomy; Cholecystectomy; Ovary removal; Appendectomy; Dilation and curettage of uterus; Colonoscopy; Urethral sling; Cystoscopy; and Ventriculoperitoneal shunt (Right, 10/15/2021).    Discharge Recommendations  Discharge Recommendations: Patient would benefit from continued therapy after discharge       Assessment  Performance deficits / Impairments: Decreased functional mobility ;Decreased high-level IADLs;Decreased ADL status;Decreased endurance;Decreased balance  Prognosis: Good  Activity Tolerance  Activity Tolerance: Patient Tolerated treatment well  Safety Devices  Type of Devices: Gait belt;Patient at risk for falls;Nurse notified;Left in chair;Call light within reach;Chair alarm in place  Restraints  Restraints Initially in Place: No    AM-PAC  AM-[facility] Daily Activity - Inpatient   How much help is needed for putting on and taking off regular lower body clothing?: A Little  How much help is needed for bathing (which includes washing, rinsing, drying)?: A Little  How much help is needed for toileting (which includes using toilet, bedpan, or urinal)?: A Little  How much help is needed for putting on and taking off regular upper body clothing?: A Little  How much help is needed for taking care of personal grooming?: A Little  How much help for eating meals?: None  AM-[facility] Inpatient Daily Activity Raw Score: 19  AM-PAC Inpatient ADL T-Scale Score : 40.22  ADL Inpatient CMS 0-100% Score: 42.8  ADL Inpatient CMS G-Code Modifier :  rail with good carryover.         Transfers  Sit to stand: Stand by assistance  Stand to sit: Stand by assistance  Transfer Comments: with RW and cues for hand placements for each ascent/descent transfer         Functional Mobility: Stand by assistance  Functional Mobility Skilled Clinical Factors: SBA with RW for short distance from EOB to recliner       Patient Education  Patient Education  Education Given To: Patient  Education Provided: Plan of Care;Transfer Training;Equipment;Precautions;ADL Adaptive Strategies;Energy Conservation;Mobility Training;Fall Prevention Strategies  Education Method: Demonstration;Verbal  Barriers to Learning: None  Education Outcome: Verbalized understanding;Demonstrated understanding;Continued education needed    Goals  Short Term Goals  Time Frame for Short Term Goals: Pt will, by discharge  Short Term Goal 1: complete bed mobility IND  Short Term Goal 2: complete functional mobility Mod I w/ LRD and good safety awareness  Short Term Goal 3: complete transfers Mod I w/ LRD and good hand placement  Short Term Goal 4: complete toileting/LB ADLs Mod I w/ AE/DME PRN  Short Term Goal 5: complete UB ADLs IND  Short Term Goal 6: Pt will increase static/dynamic standing tolerance to at least 15min SUP for increaed participation in ADLs    Plan  Occupational Therapy Plan  Times Per Week: 4-5x/wk  Times Per Day: Once a day  Current Treatment Recommendations: Balance training, Functional mobility training, Endurance training, Gait training, Patient/Caregiver education & training, Safety education & training, Pain management, Equipment evaluation, education, & procurement, Home management training, Self-Care / ADL    Minutes  OT Individual Minutes  Time In: 1401  Time Out: 1441  Minutes: 40  Time Code Minutes   Timed Code Treatment Minutes: 38 Minutes    Electronically signed by JOSH Cedeño on 6/19/25 at 3:01 PM EDT

## 2025-06-19 NOTE — PLAN OF CARE
Problem: Safety - Adult  Goal: Free from fall injury  6/19/2025 1823 by Zee Parikh, RN  Outcome: Progressing     Problem: ABCDS Injury Assessment  Goal: Absence of physical injury  6/19/2025 1823 by Zee Parikh, RN  Outcome: Progressing     Problem: Pain  Goal: Verbalizes/displays adequate comfort level or baseline comfort level  6/19/2025 1823 by Zee Parikh, RN  Outcome: Progressing

## 2025-06-20 ENCOUNTER — TELEPHONE (OUTPATIENT)
Age: 80
End: 2025-06-20

## 2025-06-20 ENCOUNTER — CARE COORDINATION (OUTPATIENT)
Dept: CARE COORDINATION | Age: 80
End: 2025-06-20

## 2025-06-20 DIAGNOSIS — S06.5XAA SDH (SUBDURAL HEMATOMA) (HCC): Primary | ICD-10-CM

## 2025-06-20 PROCEDURE — 1111F DSCHRG MED/CURRENT MED MERGE: CPT | Performed by: FAMILY MEDICINE

## 2025-06-23 ENCOUNTER — OFFICE VISIT (OUTPATIENT)
Age: 80
End: 2025-06-23

## 2025-06-23 VITALS
WEIGHT: 115 LBS | OXYGEN SATURATION: 96 % | BODY MASS INDEX: 19.14 KG/M2 | HEART RATE: 76 BPM | SYSTOLIC BLOOD PRESSURE: 142 MMHG | DIASTOLIC BLOOD PRESSURE: 74 MMHG

## 2025-06-23 DIAGNOSIS — Z09 HOSPITAL DISCHARGE FOLLOW-UP: ICD-10-CM

## 2025-06-23 DIAGNOSIS — S06.5XAA SDH (SUBDURAL HEMATOMA) (HCC): Primary | ICD-10-CM

## 2025-06-23 DIAGNOSIS — I48.20 CHRONIC ATRIAL FIBRILLATION (HCC): ICD-10-CM

## 2025-06-23 DIAGNOSIS — S22.089S CLOSED FRACTURE OF ELEVENTH THORACIC VERTEBRA, UNSPECIFIED FRACTURE MORPHOLOGY, SEQUELA: ICD-10-CM

## 2025-06-23 DIAGNOSIS — I27.20 MILD PULMONARY HYPERTENSION (HCC): ICD-10-CM

## 2025-06-23 DIAGNOSIS — G89.29 OTHER CHRONIC PAIN: ICD-10-CM

## 2025-06-23 DIAGNOSIS — S32.029S CLOSED FRACTURE OF SECOND LUMBAR VERTEBRA, UNSPECIFIED FRACTURE MORPHOLOGY, SEQUELA: ICD-10-CM

## 2025-06-23 DIAGNOSIS — Z92.89 HISTORY OF CREATION OF VENTRICULOPERITONEAL SHUNT: ICD-10-CM

## 2025-06-23 RX ORDER — HYDROCODONE BITARTRATE AND ACETAMINOPHEN 5; 325 MG/1; MG/1
0.5 TABLET ORAL EVERY 6 HOURS PRN
Qty: 40 TABLET | Refills: 0 | Status: SHIPPED | OUTPATIENT
Start: 2025-06-23 | End: 2025-06-26

## 2025-06-23 RX ORDER — HYDROCODONE BITARTRATE AND ACETAMINOPHEN 5; 325 MG/1; MG/1
1 TABLET ORAL EVERY 6 HOURS PRN
Refills: 0 | Status: CANCELLED | OUTPATIENT
Start: 2025-06-23

## 2025-06-23 RX ORDER — CEPHALEXIN 500 MG/1
CAPSULE ORAL
COMMUNITY
Start: 2025-06-20

## 2025-06-23 NOTE — DISCHARGE SUMMARY
DISCHARGE SUMMARY:    PATIENT NAME:  Monalisa Mcdowell  YOB: 1945  MEDICAL RECORD NO. [id number]  DATE: 06/23/25  PRIMARY CARE PHYSICIAN: Duane Oneil MD  ADMIT DATE:  6/17/2025    DISCHARGE DATE:  6/19/2025  DISPOSITION:  Home  ADMITTING DIAGNOSIS:   trace Left SDH (BIG3), T11 and L2 compression fx    DIAGNOSIS:   Patient Active Problem List   Diagnosis    Abnormal EKG    Mixed hyperlipidemia    Essential hypertension    Other chronic pain    Atrial fibrillation (HCC)    Osteoarthritis of hip    Bronchitis    Cough in adult    History of creation of ventriculoperitoneal shunt    Acute non-recurrent maxillary sinusitis    Mesenteroaxial gastric volvulus    Lactic acidosis    Leukocytosis    Hypertension, essential    Elevated serum creatinine    Postinfectious hypothyroidism    Severe malnutrition    SDH (subdural hematoma) (HCC)    CKD stage 3b, GFR 30-44 ml/min (HCC)    (HFpEF) heart failure with preserved ejection fraction (HCC)    Mild pulmonary hypertension (HCC)       CONSULTANTS:  IM, rick     PROCEDURES:   na    HOSPITAL COURSE:   Monalisa Mcdowell is a 80 y.o. female who was admitted on 6/17/2025 s/p FFSH. She fell backwards in the shower and hit her head on the vanity behind her. She c/o a headache and back pain after. She did not have LOC.     Hospital Course:  6/17/2025 PCC at [facility]. ICU Admit. GCS 15, no deficits.  6/18: Repeat CTH stable. Transfer order in.  6/19: med stable to DC to home. UTI on Keflex until 6/22    Labs and imaging were followed daily.      On day of discharge Monalisa Mcdowell  was tolerating a regular diet  had adequate analgeia on oral medications  had no signs of complication.  She was deemed medically stable for discharged to Home    PHYSICAL EXAMINATION:        Discharge Vitals:  height is 1.651 m (5' 5\") and weight is 52.2 kg (115 lb). Her oral temperature is 97.9 °F (36.6 °C). Her blood pressure is 146/72 (abnormal) and her pulse is 69. Her respiration is 15 and oxygen

## 2025-06-23 NOTE — PROGRESS NOTES
Patient went to [facility] ED on 06/17/25 a couple of days after the fall due to c/o a headache. She was discharged on 06/19/25. Discharge summary including imaging was reviewed and discussed with the patient today. MRI lumbar spine at the hospital revealed an acute L2 fracture and T11 fracture. CT head revealed trace left subdural hematoma. She was found to be in a-fib at the hospital. No a-fib was appreciated in her chart, per Basil Badillo PA-C in Cardiology.   [place] prescription was sent to the patient's pharmacy today.   Will consider referral to Dr. Watt in Neurology as no known referral was sent to Neurology. Patient will call me after checking if she has an appointment already.     No orders of the defined types were placed in this encounter.    The patient is to follow up as scheduled on 08/04/25.    Snehal HERNANDEZ am scribing for and in the presence of Duane Oneil MD. 6/23/25/3:59 PM EDT    Duane HERNANDEZ MD, personally performed the services described in this documentation after obtaining verbal consent from the patient, and the record is both accurate and complete.

## 2025-06-23 NOTE — PROGRESS NOTES
[facility] ([facility]) Inpatient Discharge Summary  LUIS M MYAH العلي  6/23/2025        Monalisa Mcdowell  1945  [id number]    Date & Time of Discharge: 6/19/2025  7:09 PM     Is consult a Victim of Crime?: No    Research Follow Up Concerns: None    Services provided:  Screenings: ITSS    Screen Results (If any):    ITSS:  Date Screen Completed: 06/18/2025  Patient's score= 0 for PTSD risk. ( >= 2 is positive for PTSD risk. )  Patient's score= 1 for depression risk.  ( >= 2 is positive for Depression risk )      Discharge Recommendations:  Follow up with current mental health provider      The therapist may be reached through the [facility] offices at [phone].

## 2025-06-27 ENCOUNTER — CARE COORDINATION (OUTPATIENT)
Dept: CARE COORDINATION | Age: 80
End: 2025-06-27

## 2025-06-27 NOTE — CARE COORDINATION
Care Transitions Note    Follow Up Call     Attempted to reach patient for transitions of care follow up.  Unable to reach patient.      Outreach Attempts:   HIPAA compliant voicemail left for patient.     Care Summary Note: 1st attempt.     Follow Up Appointment:   Future Appointments         Provider Specialty Dept Phone    8/4/2025 2:20 PM Duane Oneil MD Primary Care [phone]            Plan for follow-up call in 2-5 days based on severity of symptoms and risk factors. Plan for next call: symptom management-check balance and stamina/pain post fall  follow-up appointment-f/u HFU appt, review  medication management-check clarification of meds from HFU appt  referrals-check HC/PT     Dee Dee Castro LPN

## 2025-06-30 ENCOUNTER — CARE COORDINATION (OUTPATIENT)
Dept: CARE COORDINATION | Age: 80
End: 2025-06-30

## 2025-06-30 NOTE — CARE COORDINATION
Care Transitions Note    Follow Up Call     Attempted to reach patient for transitions of care follow up.  Unable to reach patient, family,  .      Outreach Attempts:   Multiple attempts to contact patient, family,   at phone numbers on file.   HIPAA compliant voicemail left for patient, family,  .     Care Summary Note: 2nd attempt-will end care transitions if no return call received.     Follow Up Appointment:   Future Appointments         Provider Specialty Dept Phone    8/4/2025 2:20 PM Duane Oneil MD Primary Care [phone]            No further follow-up call indicated based on severity of symptoms and risk factors.     Dee Dee Castro LPN

## 2025-07-10 ENCOUNTER — TELEPHONE (OUTPATIENT)
Age: 80
End: 2025-07-10

## 2025-07-10 NOTE — TELEPHONE ENCOUNTER
7/10/2025 - per Epic staff message, pt to be scheduled for a 6 week hospital follow up with Dr. Milner (L2 compression fracture) Discharged 6/19/25    Left voicemail message for patient to call back and schedule an appointment.

## 2025-07-16 ENCOUNTER — TELEPHONE (OUTPATIENT)
Age: 80
End: 2025-07-16

## 2025-07-22 ENCOUNTER — TELEPHONE (OUTPATIENT)
Age: 80
End: 2025-07-22

## 2025-07-22 RX ORDER — MICONAZOLE NITRATE 2 %
CREAM WITH APPLICATOR VAGINAL
Qty: 45 G | Refills: 0 | Status: SHIPPED | OUTPATIENT
Start: 2025-07-22 | End: 2025-07-29

## 2025-07-22 NOTE — TELEPHONE ENCOUNTER
Rx called in for vaginal cream. I do not recommend she take any pills for this due to her EKG findings of a long QT interval. She should also discuss with PCP and cardio about her EKG findings and have f/u with them. Thank you   If no better please have her come in for a visit or go to  thanks.

## 2025-07-22 NOTE — TELEPHONE ENCOUNTER
Patient called, asking that something please be sent in for a yeast infection.  Reported that symptoms started the night before last - itching and burning.  She has had yeast infections in the past and these are the same symptoms.  Chart shows that she was taking Keflex on 06/20/25 for UTI, started prior to hospital admission for fall.    She uses the [facility] in [place].    Thank you.

## 2025-08-04 ENCOUNTER — OFFICE VISIT (OUTPATIENT)
Age: 80
End: 2025-08-04

## 2025-08-04 ENCOUNTER — HOSPITAL ENCOUNTER (OUTPATIENT)
Age: 80
Setting detail: SPECIMEN
Discharge: HOME | End: 2025-08-04

## 2025-08-04 VITALS
OXYGEN SATURATION: 96 % | HEART RATE: 62 BPM | SYSTOLIC BLOOD PRESSURE: 142 MMHG | HEIGHT: 65 IN | DIASTOLIC BLOOD PRESSURE: 66 MMHG | BODY MASS INDEX: 18.66 KG/M2 | WEIGHT: 112 LBS

## 2025-08-04 DIAGNOSIS — R53.1 GENERALIZED WEAKNESS: ICD-10-CM

## 2025-08-04 DIAGNOSIS — G89.18 POSTOPERATIVE PAIN: ICD-10-CM

## 2025-08-04 DIAGNOSIS — S06.5XAA SDH (SUBDURAL HEMATOMA) (HCC): Primary | ICD-10-CM

## 2025-08-04 DIAGNOSIS — N18.32 CKD STAGE 3B, GFR 30-44 ML/MIN (HCC): ICD-10-CM

## 2025-08-04 DIAGNOSIS — I27.20 MILD PULMONARY HYPERTENSION (HCC): ICD-10-CM

## 2025-08-04 DIAGNOSIS — N30.00 ACUTE CYSTITIS WITHOUT HEMATURIA: ICD-10-CM

## 2025-08-04 DIAGNOSIS — I48.11 LONGSTANDING PERSISTENT ATRIAL FIBRILLATION (HCC): ICD-10-CM

## 2025-08-04 DIAGNOSIS — I50.32 CHRONIC HEART FAILURE WITH PRESERVED EJECTION FRACTION (HCC): ICD-10-CM

## 2025-08-04 DIAGNOSIS — I48.20 CHRONIC ATRIAL FIBRILLATION (HCC): ICD-10-CM

## 2025-08-04 LAB
BASOPHILS # BLD: 0.05 K/UL (ref 0–0.2)
BASOPHILS NFR BLD: 1 % (ref 0–2)
EOSINOPHIL # BLD: 0.13 K/UL (ref 0–0.44)
EOSINOPHILS RELATIVE PERCENT: 2 % (ref 1–4)
ERYTHROCYTE [DISTWIDTH] IN BLOOD BY AUTOMATED COUNT: 13.7 % (ref 11.8–14.4)
HCT VFR BLD AUTO: 40.4 % (ref 36.3–47.1)
HGB BLD-MCNC: 12.3 G/DL (ref 11.9–15.1)
IMM GRANULOCYTES # BLD AUTO: 0.03 K/UL (ref 0–0.3)
IMM GRANULOCYTES NFR BLD: 0 %
IRON SATN MFR SERPL: 29 % (ref 20–55)
IRON SERPL-MCNC: 88 UG/DL (ref 37–145)
LYMPHOCYTES NFR BLD: 1.99 K/UL (ref 1.1–3.7)
LYMPHOCYTES RELATIVE PERCENT: 25 % (ref 24–43)
MCH RBC QN AUTO: 27.8 PG (ref 25.2–33.5)
MCHC RBC AUTO-ENTMCNC: 30.4 G/DL (ref 28.4–34.8)
MCV RBC AUTO: 91.4 FL (ref 82.6–102.9)
MONOCYTES NFR BLD: 0.56 K/UL (ref 0.1–1.2)
MONOCYTES NFR BLD: 7 % (ref 3–12)
NEUTROPHILS NFR BLD: 65 % (ref 36–65)
NEUTS SEG NFR BLD: 5.11 K/UL (ref 1.5–8.1)
NRBC BLD-RTO: 0 PER 100 WBC
PLATELET # BLD AUTO: 252 K/UL (ref 138–453)
PMV BLD AUTO: 9.9 FL (ref 8.1–13.5)
RBC # BLD AUTO: 4.42 M/UL (ref 3.95–5.11)
T3FREE SERPL-MCNC: 2.19 PG/ML (ref 2–4.4)
T4 FREE SERPL-MCNC: 1 NG/DL (ref 0.92–1.68)
TIBC SERPL-MCNC: 306 UG/DL (ref 250–450)
TSH SERPL DL<=0.05 MIU/L-ACNC: 9.53 UIU/ML (ref 0.27–4.2)
UNSATURATED IRON BINDING CAPACITY: 218 UG/DL (ref 112–347)
WBC OTHER # BLD: 7.9 K/UL (ref 3.5–11.3)

## 2025-08-04 RX ORDER — CITALOPRAM HYDROBROMIDE 10 MG/1
30 TABLET ORAL DAILY
Qty: 90 TABLET | Refills: 2 | Status: SHIPPED | OUTPATIENT
Start: 2025-08-04

## 2025-08-04 RX ORDER — CARVEDILOL 6.25 MG/1
3.12 TABLET ORAL 2 TIMES DAILY
Qty: 60 TABLET | Refills: 3 | Status: SHIPPED | OUTPATIENT
Start: 2025-08-04

## 2025-08-04 RX ORDER — BUPROPION HYDROCHLORIDE 150 MG/1
150 TABLET ORAL EVERY MORNING
Qty: 90 TABLET | Refills: 2 | Status: SHIPPED | OUTPATIENT
Start: 2025-08-04

## 2025-08-04 RX ORDER — CEPHALEXIN 500 MG/1
500 CAPSULE ORAL 2 TIMES DAILY
Qty: 14 CAPSULE | Refills: 2 | Status: SHIPPED | OUTPATIENT
Start: 2025-08-04 | End: 2025-08-11

## 2025-08-04 RX ORDER — LEVOTHYROXINE SODIUM 75 UG/1
75 TABLET ORAL EVERY MORNING
Qty: 90 TABLET | Refills: 3 | Status: SHIPPED | OUTPATIENT
Start: 2025-08-04

## 2025-08-05 ENCOUNTER — TELEPHONE (OUTPATIENT)
Age: 80
End: 2025-08-05

## 2025-08-05 ENCOUNTER — HOSPITAL ENCOUNTER (OUTPATIENT)
Age: 80
Setting detail: SPECIMEN
Discharge: HOME | End: 2025-08-05

## 2025-08-06 ENCOUNTER — OFFICE VISIT (OUTPATIENT)
Age: 80
End: 2025-08-06
Payer: MEDICARE

## 2025-08-06 VITALS
SYSTOLIC BLOOD PRESSURE: 128 MMHG | HEART RATE: 87 BPM | BODY MASS INDEX: 18.66 KG/M2 | WEIGHT: 112 LBS | HEIGHT: 65 IN | DIASTOLIC BLOOD PRESSURE: 80 MMHG

## 2025-08-06 DIAGNOSIS — S32.020S CLOSED COMPRESSION FRACTURE OF L2 VERTEBRA, SEQUELA: Primary | ICD-10-CM

## 2025-08-06 PROCEDURE — 99214 OFFICE O/P EST MOD 30 MIN: CPT | Performed by: PHYSICIAN ASSISTANT

## 2025-08-06 PROCEDURE — 1160F RVW MEDS BY RX/DR IN RCRD: CPT | Performed by: PHYSICIAN ASSISTANT

## 2025-08-06 PROCEDURE — G8400 PT W/DXA NO RESULTS DOC: HCPCS | Performed by: PHYSICIAN ASSISTANT

## 2025-08-06 PROCEDURE — 1036F TOBACCO NON-USER: CPT | Performed by: PHYSICIAN ASSISTANT

## 2025-08-06 PROCEDURE — 1123F ACP DISCUSS/DSCN MKR DOCD: CPT | Performed by: PHYSICIAN ASSISTANT

## 2025-08-06 PROCEDURE — 1159F MED LIST DOCD IN RCRD: CPT | Performed by: PHYSICIAN ASSISTANT

## 2025-08-06 PROCEDURE — G8420 CALC BMI NORM PARAMETERS: HCPCS | Performed by: PHYSICIAN ASSISTANT

## 2025-08-06 PROCEDURE — G8427 DOCREV CUR MEDS BY ELIG CLIN: HCPCS | Performed by: PHYSICIAN ASSISTANT

## 2025-08-06 PROCEDURE — 1090F PRES/ABSN URINE INCON ASSESS: CPT | Performed by: PHYSICIAN ASSISTANT

## 2025-08-06 PROCEDURE — 3079F DIAST BP 80-89 MM HG: CPT | Performed by: PHYSICIAN ASSISTANT

## 2025-08-06 PROCEDURE — 3074F SYST BP LT 130 MM HG: CPT | Performed by: PHYSICIAN ASSISTANT

## 2025-08-08 LAB
MICROORGANISM SPEC CULT: ABNORMAL
MICROORGANISM SPEC CULT: ABNORMAL
SPECIMEN DESCRIPTION: ABNORMAL

## 2025-08-27 ENCOUNTER — TELEPHONE (OUTPATIENT)
Age: 80
End: 2025-08-27